# Patient Record
Sex: MALE | Race: BLACK OR AFRICAN AMERICAN | Employment: OTHER | URBAN - METROPOLITAN AREA
[De-identification: names, ages, dates, MRNs, and addresses within clinical notes are randomized per-mention and may not be internally consistent; named-entity substitution may affect disease eponyms.]

---

## 2017-01-03 ENCOUNTER — TELEPHONE (OUTPATIENT)
Dept: PRIMARY CARE CLINIC | Age: 67
End: 2017-01-03

## 2017-01-17 ENCOUNTER — OFFICE VISIT (OUTPATIENT)
Dept: FAMILY MEDICINE CLINIC | Age: 67
End: 2017-01-17

## 2017-01-17 VITALS
WEIGHT: 233 LBS | HEIGHT: 69 IN | BODY MASS INDEX: 34.51 KG/M2 | TEMPERATURE: 97.7 F | HEART RATE: 70 BPM | SYSTOLIC BLOOD PRESSURE: 132 MMHG | RESPIRATION RATE: 16 BRPM | DIASTOLIC BLOOD PRESSURE: 72 MMHG

## 2017-01-17 DIAGNOSIS — E78.2 MIXED HYPERLIPIDEMIA: ICD-10-CM

## 2017-01-17 DIAGNOSIS — I10 ESSENTIAL HYPERTENSION: ICD-10-CM

## 2017-01-17 NOTE — MR AVS SNAPSHOT
Visit Information Date & Time Provider Department Dept. Phone Encounter #  
 1/17/2017 12:30 PM Burt Escalante NP [place] [address] [id number] Follow-up Instructions Return in about 1 week (around 1/24/2017). Your Appointments 12/18/2017 10:00 AM  
Office Visit with MD Bryanna Lema 23 [facility]) Appt Note:   
 [address]  
  
   
 [address] Upcoming Health Maintenance Date Due DTaP/Tdap/Td series (1 - Tdap) 5/16/1971 Pneumococcal 65+ High/Highest Risk (1 of 2 - PCV13) 5/16/2015 EYE EXAM RETINAL OR DILATED Q1 8/24/2016 FOOT EXAM Q1 10/14/2016 HEMOGLOBIN A1C Q6M 6/21/2017 GLAUCOMA SCREENING Q2Y 8/24/2017 Prostate-Specific Antigen 10/3/2017 MEDICARE YEARLY EXAM 12/14/2017 MICROALBUMIN Q1 12/21/2017 LIPID PANEL Q1 12/21/2017 COLONOSCOPY 1/13/2020 Allergies as of 1/17/2017  Review Complete On: 1/17/2017 By: Yevgeniy Bullock LPN Severity Noted Reaction Type Reactions Iodine  07/27/2015    Other (comments) Vasotec [Enalapril Maleate]  12/16/2016    Other (comments) Thought to have possibly caused his pancreatitis Current Immunizations  Reviewed on 1/17/2017 Name Date Influenza High Dose Vaccine PF 10/22/2015 Influenza Vaccine 12/1/2014 Zoster Vaccine, Live 1/1/2015 Reviewed by Yevgeniy Bullock LPN on 9/54/4819 at 75:43 PM  
You Were Diagnosed With   
  
 Codes Comments Insulin dependent type 2 diabetes mellitus, uncontrolled ([facility]    -  Primary ICD-10-CM: E11.65, Z79.4 ICD-9-CM: 250.02, V58.67 Vitals BP Pulse Temp Resp Height(growth percentile) Weight(growth percentile) 132/72 70 97.7 °F (36.5 °C) (Oral) 16 5' 9\" (1.753 m) 233 lb (105.7 kg) BMI Smoking Status 34.41 kg/m2 Former Smoker Vitals History BMI and BSA Data Body Mass Index Body Surface Area 34.41 kg/m 2 2.27 m 2 Preferred Pharmacy Pharmacy Name Phone [facility] [address] [place] [address] Drive [phone] Your Updated Medication List  
  
   
This list is accurate as of: 1/17/17  1:35 PM.  Always use your most recent med list.  
  
  
  
  
 Liang Generic drug:  Blood-Glucose Meter  
by Does Not Apply route. * ACCU-CHEK SMARTVIEW TEST STRIP strip Generic drug:  glucose blood VI test strips  
by Does Not Apply route See Admin Instructions. * glucose blood VI test strips strip Commonly known as:  blood glucose test  
Test strips of patient choice - use to check blood sugar bid or as directed  
  
 amLODIPine 5 mg tablet Commonly known as:  Darvin Alstrom Take 1 Tab by mouth daily. insulin detemir 100 unit/mL (3 mL) Inpn Commonly known as:  LEVEMIR FLEXTOUCH  
20 units nightly. Insulin Needles (Disposable) 32 gauge x 1/4\" Ndle Commonly known as:  NOVOFINE 32 USE AS DIRECTED WITH LEVEMIR DAILY Lancets [facility] Dispense Accu Check Multi-Clix Drum - use to check blood sugar bid or as directed - 90 day supply  
  
 metoprolol tartrate 50 mg tablet Commonly known as:  LOPRESSOR Take 1 Tab by mouth two (2) times a day. THERA tablet Generic drug:  therapeutic multivitamin Take 1 Tab by mouth daily. VITAMIN D3 2,000 unit Cap capsule Generic drug:  Cholecalciferol (Vitamin D3)  
daily. warfarin 3 mg tablet Commonly known as:  COUMADIN Take 1 Tab by mouth daily. * Notice: This list has 2 medication(s) that are the same as other medications prescribed for you. Read the directions carefully, and ask your doctor or other care provider to review them with you. Follow-up Instructions Return in about 1 week (around 1/24/2017). Patient Instructions Learning About Diabetes Food Guidelines Your Care Instructions Meal planning is important to manage diabetes. It helps keep your blood sugar at a target level (which you set with your doctor). You don't have to eat special foods. You can eat what your family eats, including sweets once in a while. But you do have to pay attention to how often you eat and how much you eat of certain foods. You may want to work with a dietitian or a certified diabetes educator (CDE) to help you plan meals and snacks. A dietitian or CDE can also help you lose weight if that is one of your goals. What should you know about eating carbs? Managing the amount of carbohydrate (carbs) you eat is an important part of healthy meals when you have diabetes. Carbohydrate is found in many foods. · Learn which foods have carbs. And learn the amounts of carbs in different foods. ¨ Bread, cereal, pasta, and rice have about 15 grams of carbs in a serving. A serving is 1 slice of bread (1 ounce), ½ cup of cooked cereal, or 1/3 cup of cooked pasta or rice. ¨ Fruits have 15 grams of carbs in a serving. A serving is 1 small fresh fruit, such as an apple or orange; ½ of a banana; ½ cup of cooked or canned fruit; ½ cup of fruit juice; 1 cup of melon or raspberries; or 2 tablespoons of dried fruit. ¨ Milk and no-sugar-added yogurt have 15 grams of carbs in a serving. A serving is 1 cup of milk or 2/3 cup of no-sugar-added yogurt. ¨ Starchy vegetables have 15 grams of carbs in a serving. A serving is ½ cup of mashed potatoes or sweet potato; 1 cup winter squash; ½ of a small baked potato; ½ cup of cooked beans; or ½ cup cooked corn or green peas. · Learn how much carbs to eat each day and at each meal. A dietitian or CDE can teach you how to keep track of the amount of carbs you eat. This is called carbohydrate counting.  
· If you are not sure how to count carbohydrate grams, use the Plate Method to plan meals. It is a good, quick way to make sure that you have a balanced meal. It also helps you spread carbs throughout the day. ¨ Divide your plate by types of foods. Put non-starchy vegetables on half the plate, meat or other protein food on one-quarter of the plate, and a grain or starchy vegetable in the final quarter of the plate. To this you can add a small piece of fruit and 1 cup of milk or yogurt, depending on how many carbs you are supposed to eat at a meal. 
· Try to eat about the same amount of carbs at each meal. Do not \"save up\" your daily allowance of carbs to eat at one meal. 
· Proteins have very little or no carbs per serving. Examples of proteins are beef, chicken, turkey, fish, eggs, tofu, cheese, cottage cheese, and peanut butter. A serving size of meat is 3 ounces, which is about the size of a deck of cards. Examples of meat substitute serving sizes (equal to 1 ounce of meat) are 1/4 cup of cottage cheese, 1 egg, 1 tablespoon of peanut butter, and ½ cup of tofu. How can you eat out and still eat healthy? · Learn to estimate the serving sizes of foods that have carbohydrate. If you measure food at home, it will be easier to estimate the amount in a serving of restaurant food. · If the meal you order has too much carbohydrate (such as potatoes, corn, or baked beans), ask to have a low-carbohydrate food instead. Ask for a salad or green vegetables. · If you use insulin, check your blood sugar before and after eating out to help you plan how much to eat in the future. · If you eat more carbohydrate at a meal than you had planned, take a walk or do other exercise. This will help lower your blood sugar. What else should you know? · Limit saturated fat, such as the fat from meat and dairy products. This is a healthy choice because people who have diabetes are at higher risk of heart disease.  So choose lean cuts of meat and nonfat or low-fat dairy products. Use olive or canola oil instead of butter or shortening when cooking. · Don't skip meals. Your blood sugar may drop too low if you skip meals and take insulin or certain medicines for diabetes. · Check with your doctor before you drink alcohol. Alcohol can cause your blood sugar to drop too low. Alcohol can also cause a bad reaction if you take certain diabetes medicines. Follow-up care is a key part of your treatment and safety. Be sure to make and go to all appointments, and call your doctor if you are having problems. It's also a good idea to know your test results and keep a list of the medicines you take. Where can you learn more? Go to http://tayler-joan.info/. Enter J729 in the search box to learn more about \"Learning About Diabetes Food Guidelines. \" Current as of: May 23, 2016 Content Version: 11.1 © 6535-7653 [employer]. Care instructions adapted under license by [employer] (which disclaims liability or warranty for this information). If you have questions about a medical condition or this instruction, always ask your healthcare professional. [address] any warranty or liability for your use of this information. Introducing [facility]! Michael Garcia introduces [employer] patient portal. Now you can access parts of your medical record, email your doctor's office, and request medication refills online. 1. In your internet browser, go to [email or web address] 2. Click on the First Time User? Click Here link in the Sign In box. You will see the New Member Sign Up page. 3. Enter your [employer] Access Code exactly as it appears below. You will not need to use this code after youve completed the sign-up process. If you do not sign up before the expiration date, you must request a new code. · [employer] Access Code: [email or web address] Expires: 4/4/2017  9:59 AM 
 
 4. Enter the last four digits of your Social Security Number (xxxx) and Date of Birth (mm/dd/yyyy) as indicated and click Submit. You will be taken to the next sign-up page. 5. Create a [employer] ID. This will be your [employer] login ID and cannot be changed, so think of one that is secure and easy to remember. 6. Create a [employer] password. You can change your password at any time. 7. Enter your Password Reset Question and Answer. This can be used at a later time if you forget your password. 8. Enter your e-mail address. You will receive e-mail notification when new information is available in [address] 9. Click Sign Up. You can now view and download portions of your medical record. 10. Click the Download Summary menu link to download a portable copy of your medical information. If you have questions, please visit the Frequently Asked Questions section of the [employer] website. Remember, [employer] is NOT to be used for urgent needs. For medical emergencies, dial 911. Now available from your iPhone and Android! Please provide this summary of care documentation to your next provider. Your primary care clinician is listed as Kadeem Aquino. If you have any questions after today's visit, please call [phone].

## 2017-01-17 NOTE — PROGRESS NOTES
1. Have you been to the ER, urgent care clinic since your last visit? Hospitalized since your last visit? No    2. Have you seen or consulted any other health care providers outside of the [address] [place] [address] since your last visit? Include any pap smears or colon screening.  No

## 2017-01-17 NOTE — PROGRESS NOTES
Chief Complaint   Patient presents with    Blood sugar problem     *Blood sugar this a.m. was 300     HPI:    This is a 76 y/o male patient who presents for the above reason. Concerned about recent blood sugar elevations which have jorge alberto in the 300s. BS this am per pt was -300, yesterday 302  Last A1c- 7.1    Patient states compliance taking insulin has directed. He confirms he was previously taking Levemir 20 units but was recently increased to 30 by PCP. PCP note reviewed indicates a decrease from 30 to 25 units - will keep patient on 30 units due to recent BS elevation and re-evaluate in 1 week      Recently had a bone marrow biopsy and is followed by hematology for auto immune hemolytic anemia. ROS: pertinent positive as noted in HPI.  All others were negative        Past Medical History   Diagnosis Date    Colon polyps      Dr. Umm Vaz DDD (degenerative disc disease), lumbar 9/15    DM type 2 (diabetes mellitus, type 2) ([facility] [address] [facility]     Hyperlipidemia     Hypertension     Internal hemorrhoid     Microalbuminuria     Nocturia     Scre[facility] (abdominal aortic aneurysm) 6/15     [facility]    Sleep apnea      has CPAP - Dr. Radha Danielle Stasis dermatitis     Testicular pain, left     Tobacco abuse     Urinary frequency     Vitamin D deficiency        Social History     Social History    Marital status: SINGLE     Spouse name: N/A    Number of children: N/A    Years of education: N/A     Occupational History    city [facility]     Social History Main Topics    Smoking status: Former Smoker     Packs/day: 1.00     Years: 42.00     Types: Cigarettes     Quit date: 9/22/2016    Smokeless tobacco: Never Used    Alcohol use Yes      Comment: occasional    Drug use: No    Sexual activity: No      Comment: single     Other Topics Concern    Not on file     Social History Narrative       Current Outpatient Prescriptions   Medication Sig Dispense Refill    amLODIPine (NORVASC) 5 mg tablet Take 1 Tab by mouth daily. 30 Tab 5    warfarin (COUMADIN) 3 mg tablet Take 1 Tab by mouth daily. (Patient taking differently: Take 4.5 mg by mouth daily. Patient taking 1.5 tabs po daily) 10 Tab 0    metoprolol tartrate (LOPRESSOR) 50 mg tablet Take 1 Tab by mouth two (2) times a day. 60 Tab 2    therapeutic multivitamin (THERA) tablet Take 1 Tab by mouth daily.  insulin detemir (LEVEMIR FLEXTOUCH) 100 unit/mL (3 mL) inpn 20 units nightly. (Patient taking differently: 25 Units. in the evening) 15 mL 5    Insulin Needles, Disposable, (NOVOFINE 32) 32 gauge x 1/4\" ndle USE AS DIRECTED WITH LEVEMIR DAILY 100 Pen Needle 11    Lancets misc Dispense Accu Check Multi-Clix Drum - use to check blood sugar bid or as directed - 90 day supply 100 Each 3    glucose blood VI test strips (BLOOD GLUCOSE TEST) strip Test strips of patient choice - use to check blood sugar bid or as directed 200 Strip 3    Blood-Glucose Meter (ACCU-CHEK BRY) misc by Does Not Apply route.  glucose blood VI test strips (ACCU-CHEK SMARTVIEW) strip by Does Not Apply route See Admin Instructions.  Cholecalciferol, Vitamin D3, (VITAMIN D3) 2,000 unit cap daily.        Allergies   Allergen Reactions    Iodine Other (comments)    Vasotec [Enalapril Maleate] Other (comments)     Thought to have possibly caused his pancreatitis       Physical Exam:    Vital Signs:   Visit Vitals    /72    Pulse 70    Temp 97.7 °F (36.5 °C) (Oral)    Resp 16    Ht 5' 9\" (1.753 m)    Wt 233 lb (105.7 kg)    BMI 34.41 kg/m2         General: a, a & o x 3, afebrile,  interacting appropriately, in no acute distress  HEENT: head normocephalic and atraumatic, conjuctiva clear  Skin: warm and dry, no rashes , no bruises, no skin lesions  Neck: supple, symmetrical, no palpable mass, no thyromegaly, no carotid bruits, no JVD  Respiratory: lung sounds clear bilaterally,  good respiratory effort, no wheezes or crackles  Cardiovascular: normal S1S2, regular rate and rhythm, no murmurs    Assessment/Plan:      ICD-10-CM ICD-9-CM    1. Insulin dependent type 2 diabetes mellitus, uncontrolled (HCC) E11.65 250.02 Continue with Levemir 30 units QHS    Monitor BS TID and call in blood sugar reading to office tomorrow. May consider adding short acting insulin if BS are not controlled with Levemir. Patient advised to go to the ER if BS >350        Z79.4 V58.67       Z79.4 V58.67    2. Essential hypertension I10 401.9 Continue with current medication   3. Mixed hyperlipidemia E78.2 272.2 Controlled with diet           Additional Notes: Discussed today's diagnosis, treatment plans. Discussed medication indications and side effects. After Visit Summary: Provided and discussed printed patient instructions. Answered questions in relation to today's diagnosis.   Follow-up Disposition: 1 week follow up          TERRIE Jacobs  [address] [facility],[address]

## 2017-01-17 NOTE — PATIENT INSTRUCTIONS

## 2017-01-18 ENCOUNTER — TELEPHONE (OUTPATIENT)
Dept: FAMILY MEDICINE CLINIC | Age: 67
End: 2017-01-18

## 2017-01-18 NOTE — TELEPHONE ENCOUNTER
Patient called office stating that Toney Fuentes NP instructed him to call with his FBS this morning. Patient reports his blood sugar was 130.

## 2017-01-24 ENCOUNTER — OFFICE VISIT (OUTPATIENT)
Dept: FAMILY MEDICINE CLINIC | Age: 67
End: 2017-01-24

## 2017-01-24 VITALS
HEIGHT: 69 IN | BODY MASS INDEX: 34.51 KG/M2 | HEART RATE: 57 BPM | SYSTOLIC BLOOD PRESSURE: 124 MMHG | RESPIRATION RATE: 18 BRPM | TEMPERATURE: 96.3 F | WEIGHT: 233 LBS | DIASTOLIC BLOOD PRESSURE: 62 MMHG

## 2017-01-24 DIAGNOSIS — I10 ESSENTIAL HYPERTENSION: ICD-10-CM

## 2017-01-24 LAB — GLUCOSE POC: 263 MG/DL

## 2017-01-24 NOTE — MR AVS SNAPSHOT
Visit Information Date & Time Provider Department Dept. Phone Encounter #  
 1/24/2017  9:30 AM Ari Perez NP Hussain Segundo [id number] Follow-up Instructions Return in about 1 month (around 2/24/2017) for follow up chronic condition. Your Appointments 12/18/2017 10:00 AM  
Office Visit with MD Bryanna Eduardo [facility]) Appt Note:   
 [address]  
  
   
 [address] [facility] Upcoming Health Maintenance Date Due DTaP/Tdap/Td series (1 - Tdap) 5/16/1971 Pneumococcal 65+ High/Highest Risk (1 of 2 - PCV13) 5/16/2015 FOOT EXAM Q1 10/14/2016 HEMOGLOBIN A1C Q6M 6/21/2017 Prostate-Specific Antigen 10/3/2017 MEDICARE YEARLY EXAM 12/14/2017 EYE EXAM RETINAL OR DILATED Q1 12/15/2017 MICROALBUMIN Q1 12/21/2017 LIPID PANEL Q1 12/21/2017 GLAUCOMA SCREENING Q2Y 12/15/2018 COLONOSCOPY 1/13/2020 Allergies as of 1/24/2017  Review Complete On: 1/24/2017 By: Nancy Echavarria LPN Severity Noted Reaction Type Reactions Iodine  07/27/2015    Other (comments) Vasotec [Enalapril Maleate]  12/16/2016    Other (comments) Thought to have possibly caused his pancreatitis Current Immunizations  Reviewed on 1/17/2017 Name Date Influenza High Dose Vaccine PF 10/22/2015 Influenza Vaccine 12/1/2014 Zoster Vaccine, Live 1/1/2015 Not reviewed this visit You Were Diagnosed With   
  
 Codes Comments Insulin dependent type 2 diabetes mellitus, uncontrolled [facility]    -  Primary ICD-10-CM: E11.65, Z79.4 ICD-9-CM: 250.02, V58.67 Essential hypertension     ICD-10-CM: I10 
ICD-9-CM: 401.9 Vitals BP Pulse Temp Resp Height(growth percentile) Weight(growth percentile) 124/62 (!) 57 96.3 °F (35.7 °C) (Oral) 18 5' 9\" (1.753 m) 233 lb (105.7 kg) BMI Smoking Status 34.41 kg/m2 Former Smoker BMI and BSA Data Body Mass Index Body Surface Area 34.41 kg/m 2 2.27 m 2 Preferred Pharmacy Pharmacy Name Phone [facility] [address] [phone] Your Updated Medication List  
  
   
This list is accurate as of: 1/24/17 10:12 AM.  Always use your most recent med list.  
  
  
  
  
 Castillo Chavez Generic drug:  Blood-Glucose Meter  
by Does Not Apply route. * ACCU-CHEK SMARTVIEW TEST STRIP strip Generic drug:  glucose blood VI test strips  
by Does Not Apply route See Admin Instructions. * glucose blood VI test strips strip Commonly known as:  blood glucose test  
Test strips of patient choice - use to check blood sugar bid or as directed  
  
 amLODIPine 5 mg tablet Commonly known as:  Champ Fraireer Take 1 Tab by mouth daily. insulin detemir 100 unit/mL (3 mL) Inpn Commonly known as:  Stephanie Salt Inject 30 units subcutaneously in the evening Insulin Needles (Disposable) 32 gauge x 1/4\" Ndle Commonly known as:  NOVOFINE 32 USE AS DIRECTED WITH LEVEMIR DAILY Lancets [facility] Dispense Accu Check Multi-Clix Drum - use to check blood sugar bid or as directed - 90 day supply  
  
 metoprolol tartrate 50 mg tablet Commonly known as:  LOPRESSOR Take 1 Tab by mouth two (2) times a day. THERA tablet Generic drug:  therapeutic multivitamin Take 1 Tab by mouth daily. VITAMIN D3 2,000 unit Cap capsule Generic drug:  Cholecalciferol (Vitamin D3)  
daily. warfarin 3 mg tablet Commonly known as:  COUMADIN Take 1 Tab by mouth daily. ZINC PO Take  by mouth. Take 1 tablet by mouth daily. * Notice: This list has 2 medication(s) that are the same as other medications prescribed for you. Read the directions carefully, and ask your doctor or other care provider to review them with you. Prescriptions Sent to Pharmacy Refills  
 insulin detemir (LEVEMIR FLEXTOUCH) 100 unit/mL (3 mL) inpn 5 Sig: Inject 30 units subcutaneously in the evening Class: Normal  
 Pharmacy: [address] [place] [address], [address] [place] [address] #: [phone] We Performed the Following AMB POC GLUCOSE BLOOD, BY GLUCOSE MONITORING DEVICE [80225 CPT(R)] Follow-up Instructions Return in about 1 month (around 2/24/2017) for follow up chronic condition. Patient Instructions Learning About Diabetes Food Guidelines Your Care Instructions Meal planning is important to manage diabetes. It helps keep your blood sugar at a target level (which you set with your doctor). You don't have to eat special foods. You can eat what your family eats, including sweets once in a while. But you do have to pay attention to how often you eat and how much you eat of certain foods. You may want to work with a dietitian or a certified diabetes educator (CDE) to help you plan meals and snacks. A dietitian or CDE can also help you lose weight if that is one of your goals. What should you know about eating carbs? Managing the amount of carbohydrate (carbs) you eat is an important part of healthy meals when you have diabetes. Carbohydrate is found in many foods. · Learn which foods have carbs. And learn the amounts of carbs in different foods. ¨ Bread, cereal, pasta, and rice have about 15 grams of carbs in a serving. A serving is 1 slice of bread (1 ounce), ½ cup of cooked cereal, or 1/3 cup of cooked pasta or rice. ¨ Fruits have 15 grams of carbs in a serving. A serving is 1 small fresh fruit, such as an apple or orange; ½ of a banana; ½ cup of cooked or canned fruit; ½ cup of fruit juice; 1 cup of melon or raspberries; or 2 tablespoons of dried fruit. ¨ Milk and no-sugar-added yogurt have 15 grams of carbs in a serving.  A serving is 1 cup of milk or 2/3 cup of no-sugar-added yogurt. ¨ Starchy vegetables have 15 grams of carbs in a serving. A serving is ½ cup of mashed potatoes or sweet potato; 1 cup winter squash; ½ of a small baked potato; ½ cup of cooked beans; or ½ cup cooked corn or green peas. · Learn how much carbs to eat each day and at each meal. A dietitian or CDE can teach you how to keep track of the amount of carbs you eat. This is called carbohydrate counting. · If you are not sure how to count carbohydrate grams, use the Plate Method to plan meals. It is a good, quick way to make sure that you have a balanced meal. It also helps you spread carbs throughout the day. ¨ Divide your plate by types of foods. Put non-starchy vegetables on half the plate, meat or other protein food on one-quarter of the plate, and a grain or starchy vegetable in the final quarter of the plate. To this you can add a small piece of fruit and 1 cup of milk or yogurt, depending on how many carbs you are supposed to eat at a meal. 
· Try to eat about the same amount of carbs at each meal. Do not \"save up\" your daily allowance of carbs to eat at one meal. 
· Proteins have very little or no carbs per serving. Examples of proteins are beef, chicken, turkey, fish, eggs, tofu, cheese, cottage cheese, and peanut butter. A serving size of meat is 3 ounces, which is about the size of a deck of cards. Examples of meat substitute serving sizes (equal to 1 ounce of meat) are 1/4 cup of cottage cheese, 1 egg, 1 tablespoon of peanut butter, and ½ cup of tofu. How can you eat out and still eat healthy? · Learn to estimate the serving sizes of foods that have carbohydrate. If you measure food at home, it will be easier to estimate the amount in a serving of restaurant food. · If the meal you order has too much carbohydrate (such as potatoes, corn, or baked beans), ask to have a low-carbohydrate food instead. Ask for a salad or green vegetables. · If you use insulin, check your blood sugar before and after eating out to help you plan how much to eat in the future. · If you eat more carbohydrate at a meal than you had planned, take a walk or do other exercise. This will help lower your blood sugar. What else should you know? · Limit saturated fat, such as the fat from meat and dairy products. This is a healthy choice because people who have diabetes are at higher risk of heart disease. So choose lean cuts of meat and nonfat or low-fat dairy products. Use olive or canola oil instead of butter or shortening when cooking. · Don't skip meals. Your blood sugar may drop too low if you skip meals and take insulin or certain medicines for diabetes. · Check with your doctor before you drink alcohol. Alcohol can cause your blood sugar to drop too low. Alcohol can also cause a bad reaction if you take certain diabetes medicines. Follow-up care is a key part of your treatment and safety. Be sure to make and go to all appointments, and call your doctor if you are having problems. It's also a good idea to know your test results and keep a list of the medicines you take. Where can you learn more? Go to http://tayler-joan.info/. Enter K331 in the search box to learn more about \"Learning About Diabetes Food Guidelines. \" Current as of: May 23, 2016 Content Version: 11.1 © 6524-1234 [employer]. Care instructions adapted under license by [employer] (which disclaims liability or warranty for this information). If you have questions about a medical condition or this instruction, always ask your healthcare professional. [place] [address] any warranty or liability for your use of this information. Diabetes Blood Sugar Emergencies: Your Action Plan How can you prevent a blood sugar emergency? An important part of living with diabetes is keeping your blood sugar in your target range. You'll need to know what to do if it's too high or too low. Managing your blood sugar levels helps you avoid emergencies. This care sheet will teach you about the signs of high and low blood sugar. It will help you make an action plan with your doctor for when these signs occur. Low blood sugar is more likely to happen if you take certain medicines for diabetes. It can also happen if you skip a meal, drink alcohol, or exercise more than usual. 
You may get high blood sugar if you eat differently than you normally do. One example is eating more carbohydrate than usual. Having a cold, the flu, or other sudden illness can also cause high blood sugar levels. Levels can also rise if you miss a dose of medicine. Any change in how you take your medicine may affect your blood sugar level. So it's important to work with your doctor before you make any changes. Check your blood sugar Work with your doctor to fill in the blank spaces below that apply to you. Track your levels, know your target range, and write down ways you can get your blood sugar back in your target range. A log book can help you track your levels. Take the book to all of your medical appointments. · Check your blood sugar _____ times a day, at these times:________________________________________________. (For example: Before meals, at bedtime, before exercise, during exercise, other.) · Your blood sugar target range before a meal is ___________________. Your blood sugar target range after a meal is _______________________. · Do this___________________________________________________to get your blood sugar back within your safe range if your blood sugar results are _________________________________________. (For example: Less than 70 or above 250 mg/dL.) Call your doctor when your blood sugar results are ___________________________________. (For example: Less than 70 or above 250 mg/dL. ) What are the symptoms of low and high blood sugar? Common symptoms of low blood sugar are sweating and feeling shaky, weak, hungry, or confused. Symptoms can start quickly. Common symptoms of high blood sugar are feeling very thirsty or very hungry. You may also pass urine more often than usual. You may have blurry vision and may lose weight without trying. But some people may have high or low blood sugar without having any symptoms. That's a good reason to check your blood sugar on a regular schedule. What should you do if you have symptoms? Work with your doctor to fill in the blank spaces below that apply to you. Low blood sugar If you have symptoms of low blood sugar, check your blood sugar. If it's below _____ (for example, below 70), eat or drink a quick-sugar food that has about 15 grams of carbohydrate. Your goal is to get your level back to your safe range. Check your blood sugar again 15 minutes later. If it's still not in your target range, take another 15 grams of carbohydrate and check your blood sugar again in 15 minutes. Repeat this until you reach your target. Then go back to your regular testing schedule. When you have low blood sugar, it's best to stop or reduce any physical activity until your blood sugar is back in your target range and is stable. If you must stay active, eat or drink 30 grams of carbohydrate. Then check your blood sugar again in 15 minutes. If it's not in your target range, take another 30 grams of carbohydrates. Check your blood sugar again in 15 minutes. Keep doing this until you reach your target. You can then go back to your regular testing schedule. If your symptoms or blood sugar levels are getting worse or have not improved after 15 minutes, seek medical care right away. Here are some examples of quick-sugar foods with 15 grams of carbohydrate: · 3 or 4 glucose tablets · 1 tube of glucose gel · Hard candy (such as 3 Jolly Ranchers or 5 to [employer]) · ½ cup to ¾ cup (4 to 6 ounces) of fruit juice or regular (not diet) soda High blood sugar If you have symptoms of high blood sugar, check your blood sugar. Your goal is to get your level back to your target range. If it's above ______ (for example, above 250), follow these steps: · If you missed a dose of your diabetes medicine, take it now. Take only the amount of medicine that you have been prescribed. Do not take more or less medicine. · Give yourself insulin if your doctor has prescribed it for high blood sugar. · Test for ketones, if the doctor told you to do so. If the results of the ketone test show a moderate-to-large amount of ketones, call the doctor for advice. · Wait 30 minutes after you take the extra insulin or the missed medicine. Check your blood sugar again. If your symptoms or blood sugar levels are getting worse or have not improved after taking these steps, seek medical care right away. Follow-up care is a key part of your treatment and safety. Be sure to make and go to all appointments, and call your doctor if you are having problems. It's also a good idea to know your test results and keep a list of the medicines you take. Where can you learn more? Go to http://tayler-joan.info/. Enter U877 in the search box to learn more about \"Diabetes Blood Sugar Emergencies: Your Action Plan. \" Current as of: May 23, 2016 Content Version: 11.1 © 2080-5046 [employer]. Care instructions adapted under license by [employer] (which disclaims liability or warranty for this information). If you have questions about a medical condition or this instruction, always ask your healthcare professional. [address] any warranty or liability for your use of this information. Introducing [facility]!    
 Viviane Parsons introduces [employer] patient portal. Now you can access parts of your medical record, email your doctor's office, and request medication refills online. 1. In your internet browser, go to [email or web address] 2. Click on the First Time User? Click Here link in the Sign In box. You will see the New Member Sign Up page. 3. Enter your [employer] Access Code exactly as it appears below. You will not need to use this code after youve completed the sign-up process. If you do not sign up before the expiration date, you must request a new code. · [employer] Access Code: [email or web address] Expires: 4/4/2017  9:59 AM 
 
4. Enter the last four digits of your Social Security Number (xxxx) and Date of Birth (mm/dd/yyyy) as indicated and click Submit. You will be taken to the next sign-up page. 5. Create a [employer] ID. This will be your [employer] login ID and cannot be changed, so think of one that is secure and easy to remember. 6. Create a [employer] password. You can change your password at any time. 7. Enter your Password Reset Question and Answer. This can be used at a later time if you forget your password. 8. Enter your e-mail address. You will receive e-mail notification when new information is available in [address] 9. Click Sign Up. You can now view and download portions of your medical record. 10. Click the Download Summary menu link to download a portable copy of your medical information. If you have questions, please visit the Frequently Asked Questions section of the [employer] website. Remember, [employer] is NOT to be used for urgent needs. For medical emergencies, dial 911. Now available from your iPhone and Android! Please provide this summary of care documentation to your next provider. Your primary care clinician is listed as Adele Garcia. If you have any questions after today's visit, please call [phone].

## 2017-01-24 NOTE — PROGRESS NOTES
Chief Complaint   Patient presents with    Diabetes     1 week follow up       HPI:    This is a is 78 y/o male patient who presents for follow up diabetes. He denies SOB, CP, dizziness, headache but concerned he is loosing weight. Diabetes: patient states he is compliant checking BS sugars daily but forgot to take his insulin last night. Pt reported blood sugar this morning - 261  Last A1c- 7.1    Pt has appointment with endocrine Dr Denise Beasley- 2-15-17    He sees hematologist Dr Jose Newell Monday for anemia      Results for orders placed or performed in visit on 01/24/17   AMB POC GLUCOSE BLOOD, BY GLUCOSE MONITORING DEVICE   Result Value Ref Range    Glucose  mg/dL             ROS: pertinent positive as noted in HPI. All others were negative        Past Medical History   Diagnosis Date    Colon polyps      Dr. Denia Gudino DDD (degenerative disc disease), lumbar 9/15    DM type 2 (diabetes mellitus, type 2) ([facility] [address]     Hyperlipidemia     Hypertension     Internal hemorrhoid     Microalbuminuria     Nocturia     Screening for AAA (abdominal aortic aneurysm) 6/15     [facility]    Sleep apnea      has CPAP - Dr. Castillo Angelo Stasis dermatitis     Testicular pain, left     Tobacco abuse     Urinary frequency     Vitamin D deficiency        Social History     Social History    Marital status: SINGLE     Spouse name: N/A    Number of children: N/A    Years of education: N/A     Occupational History    city of [facility]     Social History Main Topics    Smoking status: Former Smoker     Packs/day: 1.00     Years: 42.00     Types: Cigarettes     Quit date: 9/22/2016    Smokeless tobacco: Never Used    Alcohol use Yes      Comment: occasional    Drug use: No    Sexual activity: No      Comment: single     Other Topics Concern    Not on file     Social History Narrative     Current Outpatient Prescriptions   Medication Sig Dispense Refill    ZINC PO Take  by mouth.  Take 1 tablet by mouth daily.  insulin detemir (LEVEMIR FLEXTOUCH) 100 unit/mL (3 mL) inpn Inject 30 units subcutaneously in the evening 15 mL 5    amLODIPine (NORVASC) 5 mg tablet Take 1 Tab by mouth daily. 30 Tab 5    warfarin (COUMADIN) 3 mg tablet Take 1 Tab by mouth daily. (Patient taking differently: Take 6 mg by mouth daily. Patient taking 1.5 tabs po daily) 10 Tab 0    metoprolol tartrate (LOPRESSOR) 50 mg tablet Take 1 Tab by mouth two (2) times a day. 60 Tab 2    therapeutic multivitamin (THERA) tablet Take 1 Tab by mouth daily.  Insulin Needles, Disposable, (NOVOFINE 32) 32 gauge x 1/4\" ndle USE AS DIRECTED WITH LEVEMIR DAILY 100 Pen Needle 11    Lancets misc Dispense Accu Check Multi-Clix Drum - use to check blood sugar bid or as directed - 90 day supply 100 Each 3    glucose blood VI test strips (BLOOD GLUCOSE TEST) strip Test strips of patient choice - use to check blood sugar bid or as directed 200 Strip 3    Blood-Glucose Meter (ACCU-CHEK BRY) misc by Does Not Apply route.  glucose blood VI test strips (ACCU-CHEK SMARTVIEW) strip by Does Not Apply route See Admin Instructions.  Cholecalciferol, Vitamin D3, (VITAMIN D3) 2,000 unit cap daily.            Allergies   Allergen Reactions    Iodine Other (comments)    Vasotec [Enalapril Maleate] Other (comments)     Thought to have possibly caused his pancreatitis       Physical Exam:    Vital Signs:     Visit Vitals    /62    Pulse (!) 57    Temp 96.3 °F (35.7 °C) (Oral)    Resp 18    Ht 5' 9\" (1.753 m)    Wt 233 lb (105.7 kg)    BMI 34.41 kg/m2         General: a, a & o x 3, afebrile,  interacting appropriately, in no acute distress  HEENT: head normocephalic and atraumatic, conjuctiva clear  Respiratory: lung sounds clear bilaterally, good respiratory effort, no wheezes or crackles  Cardiovascular: normal S1S2, regular rate and rhythm, no murmurs  Abdomen: non-distended, normal bowel sounds x 4 quadrants, soft, non-tender to palpation,      Assessment/Plan:      ICD-10-CM ICD-9-CM    1. Insulin dependent type 2 diabetes mellitus, uncontrolled (HCC) E11.65 250.02 AMB POC GLUCOSE BLOOD, BY GLUCOSE MONITORING DEVICE    Z79.4 V58.67 insulin detemir (LEVEMIR FLEXTOUCH) 100 unit/mL (3 mL) inpn    Patient advised to take BS TID and call in blood sugars on Friday. Advised to go the ER if BS greater than 300    May consider adding a short acting insulin if BS remain elevated. 2. Essential hypertension ( controlled) I10 401.9 Continue with current medication         Additional Notes: Discussed today's diagnosis, treatment plans. Discussed medication indications and side effects. After Visit Summary: Provided and discussed printed patient instructions. Answered questions in relation to today's diagnosis.   Follow-up Disposition: 1 month follow up        Melissa Live NP-BC  Family Medicine  [facility]        Orders Placed This Encounter    AMB POC GLUCOSE BLOOD, BY GLUCOSE MONITORING DEVICE    ZINC PO    insulin detemir (LEVEMIR FLEXTOUCH) 100 unit/mL (3 mL) inpn

## 2017-01-24 NOTE — PROGRESS NOTES
1. Have you been to the ER, urgent care clinic since your last visit? Hospitalized since your last visit? No    2. Have you seen or consulted any other health care providers outside of the [address] [place] [address] since your last visit? Include any pap smears or colon screening.  No

## 2017-01-24 NOTE — PATIENT INSTRUCTIONS
Learning About Diabetes Food Guidelines  Your Care Instructions  Meal planning is important to manage diabetes. It helps keep your blood sugar at a target level (which you set with your doctor). You don't have to eat special foods. You can eat what your family eats, including sweets once in a while. But you do have to pay attention to how often you eat and how much you eat of certain foods. You may want to work with a dietitian or a certified diabetes educator (CDE) to help you plan meals and snacks. A dietitian or CDE can also help you lose weight if that is one of your goals. What should you know about eating carbs? Managing the amount of carbohydrate (carbs) you eat is an important part of healthy meals when you have diabetes. Carbohydrate is found in many foods. · Learn which foods have carbs. And learn the amounts of carbs in different foods. ¨ Bread, cereal, pasta, and rice have about 15 grams of carbs in a serving. A serving is 1 slice of bread (1 ounce), ½ cup of cooked cereal, or 1/3 cup of cooked pasta or rice. ¨ Fruits have 15 grams of carbs in a serving. A serving is 1 small fresh fruit, such as an apple or orange; ½ of a banana; ½ cup of cooked or canned fruit; ½ cup of fruit juice; 1 cup of melon or raspberries; or 2 tablespoons of dried fruit. ¨ Milk and no-sugar-added yogurt have 15 grams of carbs in a serving. A serving is 1 cup of milk or 2/3 cup of no-sugar-added yogurt. ¨ Starchy vegetables have 15 grams of carbs in a serving. A serving is ½ cup of mashed potatoes or sweet potato; 1 cup winter squash; ½ of a small baked potato; ½ cup of cooked beans; or ½ cup cooked corn or green peas. · Learn how much carbs to eat each day and at each meal. A dietitian or CDE can teach you how to keep track of the amount of carbs you eat. This is called carbohydrate counting. · If you are not sure how to count carbohydrate grams, use the Plate Method to plan meals.  It is a good, quick way to make sure that you have a balanced meal. It also helps you spread carbs throughout the day. ¨ Divide your plate by types of foods. Put non-starchy vegetables on half the plate, meat or other protein food on one-quarter of the plate, and a grain or starchy vegetable in the final quarter of the plate. To this you can add a small piece of fruit and 1 cup of milk or yogurt, depending on how many carbs you are supposed to eat at a meal.  · Try to eat about the same amount of carbs at each meal. Do not \"save up\" your daily allowance of carbs to eat at one meal.  · Proteins have very little or no carbs per serving. Examples of proteins are beef, chicken, turkey, fish, eggs, tofu, cheese, cottage cheese, and peanut butter. A serving size of meat is 3 ounces, which is about the size of a deck of cards. Examples of meat substitute serving sizes (equal to 1 ounce of meat) are 1/4 cup of cottage cheese, 1 egg, 1 tablespoon of peanut butter, and ½ cup of tofu. How can you eat out and still eat healthy? · Learn to estimate the serving sizes of foods that have carbohydrate. If you measure food at home, it will be easier to estimate the amount in a serving of restaurant food. · If the meal you order has too much carbohydrate (such as potatoes, corn, or baked beans), ask to have a low-carbohydrate food instead. Ask for a salad or green vegetables. · If you use insulin, check your blood sugar before and after eating out to help you plan how much to eat in the future. · If you eat more carbohydrate at a meal than you had planned, take a walk or do other exercise. This will help lower your blood sugar. What else should you know? · Limit saturated fat, such as the fat from meat and dairy products. This is a healthy choice because people who have diabetes are at higher risk of heart disease. So choose lean cuts of meat and nonfat or low-fat dairy products. Use olive or canola oil instead of butter or shortening when cooking.   · Don't skip meals. Your blood sugar may drop too low if you skip meals and take insulin or certain medicines for diabetes. · Check with your doctor before you drink alcohol. Alcohol can cause your blood sugar to drop too low. Alcohol can also cause a bad reaction if you take certain diabetes medicines. Follow-up care is a key part of your treatment and safety. Be sure to make and go to all appointments, and call your doctor if you are having problems. It's also a good idea to know your test results and keep a list of the medicines you take. Where can you learn more? Go to http://[employer].info/. Enter L991 in the search box to learn more about \"Learning About Diabetes Food Guidelines. \"  Current as of: May 23, 2016  Content Version: 11.1  © 5701-2238 [employer]. Care instructions adapted under license by [employer] (which disclaims liability or warranty for this information). If you have questions about a medical condition or this instruction, always ask your healthcare professional. [address] any warranty or liability for your use of this information. Diabetes Blood Sugar Emergencies: Your Action Plan  How can you prevent a blood sugar emergency? An important part of living with diabetes is keeping your blood sugar in your target range. You'll need to know what to do if it's too high or too low. Managing your blood sugar levels helps you avoid emergencies. This care sheet will teach you about the signs of high and low blood sugar. It will help you make an action plan with your doctor for when these signs occur. Low blood sugar is more likely to happen if you take certain medicines for diabetes. It can also happen if you skip a meal, drink alcohol, or exercise more than usual.  You may get high blood sugar if you eat differently than you normally do.  One example is eating more carbohydrate than usual. Having a cold, the flu, or other sudden illness can also cause high blood sugar levels. Levels can also rise if you miss a dose of medicine. Any change in how you take your medicine may affect your blood sugar level. So it's important to work with your doctor before you make any changes. Check your blood sugar  Work with your doctor to fill in the blank spaces below that apply to you. Track your levels, know your target range, and write down ways you can get your blood sugar back in your target range. A log book can help you track your levels. Take the book to all of your medical appointments. · Check your blood sugar _____ times a day, at these times:________________________________________________. (For example: Before meals, at bedtime, before exercise, during exercise, other.)  · Your blood sugar target range before a meal is ___________________. Your blood sugar target range after a meal is _______________________. · Do this--___________________________________________________--to get your blood sugar back within your safe range if your blood sugar results are _________________________________________. (For example: Less than 70 or above 250 mg/dL.)  Call your doctor when your blood sugar results are ___________________________________. (For example: Less than 70 or above 250 mg/dL.)  What are the symptoms of low and high blood sugar? Common symptoms of low blood sugar are sweating and feeling shaky, weak, hungry, or confused. Symptoms can start quickly. Common symptoms of high blood sugar are feeling very thirsty or very hungry. You may also pass urine more often than usual. You may have blurry vision and may lose weight without trying. But some people may have high or low blood sugar without having any symptoms. That's a good reason to check your blood sugar on a regular schedule. What should you do if you have symptoms? Work with your doctor to fill in the blank spaces below that apply to you.   Low blood sugar  If you have symptoms of low blood sugar, check your blood sugar. If it's below _____ (for example, below 70), eat or drink a quick-sugar food that has about 15 grams of carbohydrate. Your goal is to get your level back to your safe range. Check your blood sugar again 15 minutes later. If it's still not in your target range, take another 15 grams of carbohydrate and check your blood sugar again in 15 minutes. Repeat this until you reach your target. Then go back to your regular testing schedule. When you have low blood sugar, it's best to stop or reduce any physical activity until your blood sugar is back in your target range and is stable. If you must stay active, eat or drink 30 grams of carbohydrate. Then check your blood sugar again in 15 minutes. If it's not in your target range, take another 30 grams of carbohydrates. Check your blood sugar again in 15 minutes. Keep doing this until you reach your target. You can then go back to your regular testing schedule. If your symptoms or blood sugar levels are getting worse or have not improved after 15 minutes, seek medical care right away. Here are some examples of quick-sugar foods with 15 grams of carbohydrate:  · 3 or 4 glucose tablets  · 1 tube of glucose gel  · Hard candy (such as 3 Jolly Ranchers or 5 to 7 Life Savers)  · ½ cup to ¾ cup (4 to 6 ounces) of fruit juice or regular (not diet) soda  High blood sugar  If you have symptoms of high blood sugar, check your blood sugar. Your goal is to get your level back to your target range. If it's above ______ (for example, above 250), follow these steps:  · If you missed a dose of your diabetes medicine, take it now. Take only the amount of medicine that you have been prescribed. Do not take more or less medicine. · Give yourself insulin if your doctor has prescribed it for high blood sugar. · Test for ketones, if the doctor told you to do so.  If the results of the ketone test show a moderate-to-large amount of ketones, call the doctor for advice. · Wait 30 minutes after you take the extra insulin or the missed medicine. Check your blood sugar again. If your symptoms or blood sugar levels are getting worse or have not improved after taking these steps, seek medical care right away. Follow-up care is a key part of your treatment and safety. Be sure to make and go to all appointments, and call your doctor if you are having problems. It's also a good idea to know your test results and keep a list of the medicines you take. Where can you learn more? Go to http://tayler-joan.info/. Enter L118 in the search box to learn more about \"Diabetes Blood Sugar Emergencies: Your Action Plan. \"  Current as of: May 23, 2016  Content Version: 11.1  © 1980-7188 [employer]. Care instructions adapted under license by [employer] (which disclaims liability or warranty for this information). If you have questions about a medical condition or this instruction, always ask your healthcare professional. [address] any warranty or liability for your use of this information.

## 2017-02-21 ENCOUNTER — OFFICE VISIT (OUTPATIENT)
Dept: FAMILY MEDICINE CLINIC | Age: 67
End: 2017-02-21

## 2017-02-21 VITALS
BODY MASS INDEX: 34.8 KG/M2 | OXYGEN SATURATION: 100 % | RESPIRATION RATE: 20 BRPM | TEMPERATURE: 96.2 F | DIASTOLIC BLOOD PRESSURE: 76 MMHG | HEIGHT: 69 IN | WEIGHT: 235 LBS | HEART RATE: 63 BPM | SYSTOLIC BLOOD PRESSURE: 134 MMHG

## 2017-02-21 DIAGNOSIS — R63.4 WEIGHT LOSS, ABNORMAL: ICD-10-CM

## 2017-02-21 DIAGNOSIS — E78.2 MIXED HYPERLIPIDEMIA: ICD-10-CM

## 2017-02-21 DIAGNOSIS — I10 ESSENTIAL HYPERTENSION: ICD-10-CM

## 2017-02-21 DIAGNOSIS — I48.0 PAROXYSMAL ATRIAL FIBRILLATION (HCC): ICD-10-CM

## 2017-02-21 DIAGNOSIS — R35.1 NOCTURIA: ICD-10-CM

## 2017-02-21 DIAGNOSIS — Z87.448 HX OF RENAL FAILURE: ICD-10-CM

## 2017-02-21 DIAGNOSIS — L72.0 EPIDERMAL INCLUSION CYST: ICD-10-CM

## 2017-02-21 DIAGNOSIS — E55.9 VITAMIN D DEFICIENCY: ICD-10-CM

## 2017-02-21 DIAGNOSIS — R63.0 LACK OF APPETITE: ICD-10-CM

## 2017-02-21 DIAGNOSIS — D64.89 ANEMIA DUE TO OTHER CAUSE: ICD-10-CM

## 2017-02-21 NOTE — PROGRESS NOTES
Sharyn Coleman is a 77 y.o. male and presents with Follow-up       Subjective:  Mr. Micheal Tobar is here today to follow up on his chronic medical conditions and review labs. 1. Lack of Appetite and Weight Loss:  - Weight seems to have stabilized since 11/2016 ranging from 233-238lbs. He was 233lbs during his last ov on 1/24/17 and he is 235lbs today. When I first started seeing him he was losing 8 lbs per week and then 14 lbs over 3 weeks etc. Appetite is better, but diet is limited by coumadin restrictions. The bad taste in his mouth is much less prominent. Vitamin C was stopped because, per the Pharm D I consulted, it can sometimes cause patient's to have a taste disturbance. Mr. Micheal Tobar thinks stopping the Vitamin C helped a lot. We did a trial off of his Norvasc to see if that was contributing to the taste disturbance, but patient doesn't feel it made much difference. He is now back on Norvasc.  - Patient was referred to Heme/Onc (Dr. Shelli Sinha) for his anemia and abnormal SPEP/UPEP. Diagnosed with Autoimmune Hemolytic Anemia (JAYMIE positive). Patient has had infusions of IVIG, CT chest/abdomen/pelvis, bone marrow biopsy  - Patient was referred to GI to evaluate weight loss and pancreatic pseudocyts. Note reviewed. Patient declined EUS with FNA for pancreatic cyst due to risk complication. He was going to have a KUB for his complaint of chronic constipation. Apparently, an EGD was scheduled on 12/14/16 at [facility], but patient canceled after he was told that there is a risk of pancreatitis with EGDs. He is terrified of getting pancreatitis again and the idea of possibly being readmitted to the hospital is something he cannot handle right now. - CT Chest/Abdomen/Pelvis done on 10/19 - see below. - Renal US done for renal cysts seen on CT - Bilateral simple appearing renal cysts  - PSA 0.5. TFTs wnl. ALEXANDRA done and stool was heme (-). Colonoscopy done in 1/2015 = normal CRC screening colonoscopy      2.  Atrial Fibrillation  - Followed by Dr. Luba Roe and recently had a stress test. On Metoprolol BID and coumadin. He will return to see Dr. Sylvester Hirsch in 3 months. INR checks per coumadin clinic. Current dose of coumadin is 6 mg Mon-Thurs and 9 mg on Friday. 3. Diabetes  - Last A1c 7.1. Followed by Dr. Nayeli Alford. Still on Levemir - instructed to go up to 36 units. He was also given a patch to monitor his blood sugar at night. Will go back to see him next week. Microalbumin/Cr ratio 814. Patient is not on ACE-I; vasotec was stopped in the hospital and thought to have possibly caused his pancreatitis. Saw Dr. Neo Pandya in 12/2016 - dx with mild background retinopathy and will follow up in 1 year. Patient has a Podiatrist that he saw this year - asked him to bring in a copy of last office note.       4. Nocturia  - Evaluated by Dr. Jasper Kilpatrick and did a voiding diary for three days. Patient advised to limit fluids into PM and elevate legs in afternoon. Dr. Master Almonte note said he would consider an anticholinergic qhs if nocturia is persistent and more bothersome. Patient is frustrated that his symptoms have not improved. He typically has to get up to use the restroom around 5 am and then he has to go often between 5-8 am. Things seem to slow down after 8 am. He admits that cutting down on the amount of water he drinks at night before bed helps. He would like to see a different Urologist.       5. Anemia  - Patient never had anemia prior to being hospitalized. Peripheral smear = normocytic, normochromic anemia with adequate platelets. Iron/ferritin studies looked like ACD (Chronic disease from what? DM?); Stool heme (-) and last colonoscopy was normal in 2015; B12 1239; He was taking folic acid but stopped.    - He has been evaluated by Heme/Onc - Dr. Reg Harley. Notes reviewed. AIHA resolved. Extensive work up done including blood work, imaging, and bone marrow biopsy. Still no definitive explanation for the anemia.  Patient will return in 4 months.       6. HTN  - /76 pretty good today. On Metoprolol 50 mg BID and Norvasc 5 mg daily. No dizziness or lightheadedness.        7. Renal Failure  - Resolved. Cr down to 0.76. Saw Nephrology on 10/10/16 - kidneys doing well. He will go back to see Dr. Sonal Underwood in June 2017.      8. Sebaceous Cyst on Back  - Patient evaluated by General Surgery. Per Dr. Joaquin Archer note - patient underwent an outpatient excision of a chronically infected ruptured epidermal inclusion cyst. He had a home health nurse coming to his home to change the packing and dressing and assess for signs of infection. He had a follow up appointment with the surgeon on 2/14/17 and the notes states that the wound has healed nicely. 9. Hyperlipidemia - Last FLP from 12/2016 showed improvement. Patient has refused a statin in the past due to his concern over potential side effects. Total cholesterol 128, , HDL still low at 36 and LDL down to 65.2    10. Vitamin D Deficiency - Last level good at 40.8. Patient is on a daily supplement.        Health Maintenance:  PSA - 0.5 on 10/3/16  Colonoscopy - Done 1/2015. Normal  Eye Exam - Done with Dr. Cassandra Kowalski in 12/2016    ROS:  Pt denies: Fever/Chills, HA, Visual changes, Fatigue, Chest pain, SOB, LOCKETT, Abd pain, N/V/D/C, Blood in stool or urine, Edema. Pertinent positive as above in HPI. All others negative. (+) Weight Loss has stabilized    The problem list was updated as a part of today's visit.   Patient Active Problem List   Diagnosis Code    Hypertension I10    Hyperlipidemia E78.5    Vitamin D deficiency E55.9    Sleep apnea G47.30    Tobacco abuse Z72.0    Stasis dermatitis I83.10    Internal hemorrhoid K64.8    Microalbuminuria R80.9    Insulin dependent type 2 diabetes mellitus, uncontrolled (HCC) E11.65, Z79.4    Testicular pain N50.819    DDD (degenerative disc disease), lumbar M51.36    Paroxysmal atrial fibrillation (HCC) I48.0    Acute diastolic CHF (congestive heart failure) ([facility]) I50.31    Nocturia R35.1       The PSH, FH were reviewed. SH:  Social History   Substance Use Topics    Smoking status: Former Smoker     Packs/day: 1.00     Years: 42.00     Types: Cigarettes     Quit date: 9/22/2016    Smokeless tobacco: Never Used    Alcohol use Yes      Comment: occasional         Medications/Allergies:  Current Outpatient Prescriptions on File Prior to Visit   Medication Sig Dispense Refill    ZINC PO Take  by mouth. Take 1 tablet by mouth daily.  insulin detemir (LEVEMIR FLEXTOUCH) 100 unit/mL (3 mL) inpn Inject 30 units subcutaneously in the evening 15 mL 5    amLODIPine (NORVASC) 5 mg tablet Take 1 Tab by mouth daily. 30 Tab 5    warfarin (COUMADIN) 3 mg tablet Take 1 Tab by mouth daily. (Patient taking differently: Take 6 mg by mouth daily. Patient taking 1.5 tabs po daily) 10 Tab 0    metoprolol tartrate (LOPRESSOR) 50 mg tablet Take 1 Tab by mouth two (2) times a day. 60 Tab 2    therapeutic multivitamin (THERA) tablet Take 1 Tab by mouth daily.  Insulin Needles, Disposable, (NOVOFINE 32) 32 gauge x 1/4\" ndle USE AS DIRECTED WITH LEVEMIR DAILY 100 Pen Needle 11    Lancets misc Dispense Accu Check Multi-Clix Drum - use to check blood sugar bid or as directed - 90 day supply 100 Each 3    glucose blood VI test strips (BLOOD GLUCOSE TEST) strip Test strips of patient choice - use to check blood sugar bid or as directed 200 Strip 3    Blood-Glucose Meter (ACCU-CHEK BRY) misc by Does Not Apply route.  glucose blood VI test strips (ACCU-CHEK SMARTVIEW) strip by Does Not Apply route See Admin Instructions.  Cholecalciferol, Vitamin D3, (VITAMIN D3) 2,000 unit cap daily. No current facility-administered medications on file prior to visit.            Allergies   Allergen Reactions    Iodine Other (comments)    Vasotec [Enalapril Maleate] Other (comments)     Thought to have possibly caused his pancreatitis       Objective:  Visit Vitals    /76 (BP 1 Location: Left arm, BP Patient Position: Sitting)    Pulse 63    Temp 96.2 °F (35.7 °C) (Oral)    Resp 20    Ht 5' 9\" (1.753 m)    Wt 235 lb (106.6 kg)    SpO2 100%  Comment: room air    BMI 34.7 kg/m2    Body mass index is 34.7 kg/(m^2). Appearance: Alert and oriented. No acute distress.    HEENT: NC/AT. Conjunctiva normal. Anicteric sclerae. PERRL, EOMI. TMs clear. Oropharynx clear and moist.    Neck: Neck supple. No JVD. No cervical LAD. Heart: Rhythm sounds regular today without M/R/G. Intact distal pulses. Lungs: CTAB, no rhonchi, rales, crackles or wheezes with good air exchange  Abdomen: Obese, soft, normoactive BS, non tender, non distended, no palpable masses.    Back: Well healed linear surgical scar noted on mid-upper back 2/2 excision of epidermal inclusion cyst.   Ext: No cyanosis. LEs with shiny, hyperpigmented skin. Feet warm and well perfused with palpable dp pulses. No edema. Neuro: No focal deficits. Speech normal  Psych: Mood good today.  Short-term memory intact.      Labwork and Ancillary Studies:    CBC w/Diff  Lab Results   Component Value Date/Time    WBC 11.1 10/25/2016 12:06 PM    HGB 8.0 10/25/2016 12:06 PM    PLATELET 810 79/16/6861 12:06 PM         Basic Metabolic Profile  Lab Results   Component Value Date/Time    Sodium 138 12/21/2016 08:33 AM    Potassium 3.9 12/21/2016 08:33 AM    Chloride 100 12/21/2016 08:33 AM    CO2 31 12/21/2016 08:33 AM    Anion gap 7 12/21/2016 08:33 AM    Glucose 171 12/21/2016 08:33 AM    BUN 12 12/21/2016 08:33 AM    Creatinine 0.76 12/21/2016 08:33 AM    BUN/Creatinine ratio 16 12/21/2016 08:33 AM    GFR est AA >60 12/21/2016 08:33 AM    GFR est non-AA >60 12/21/2016 08:33 AM    Calcium 8.5 12/21/2016 08:33 AM        Cholesterol  Lab Results   Component Value Date/Time    Cholesterol, total 128 12/21/2016 08:33 AM    HDL Cholesterol 36 12/21/2016 08:33 AM    LDL, calculated 65.2 12/21/2016 08:33 AM    Triglyceride 134 12/21/2016 08:33 AM    CHOL/HDL Ratio 3.6 12/21/2016 08:33 AM     CT Chest/Abdomen/Pelvis 10/2016:  Impression:   1.  Large pseudocyst in the region of the body and tail of the pancreas.  Lesion is characterized by a thick wall and inspissated material internally. 2.  Atrophied pancreas post pancreatitis. 3.  Bilateral renal cysts.  One of the cysts has become a hyperdense cyst since the last exam and is located in the lateral cortex of the left kidney. 4.  Small likely effusion. Renal US 10/2016:  Right kidney measures 13 cm in length with mildly increased echogenicity. There  are 2 simple appearing cyst in the mid and upper poles measuring up to 1.7 x 2.3  x 2.2 cm in the medial upper pole. No solid mass lesion or hydronephrosis.     Left kidney measures 12.7 cm in length also with mild increased echogenicity. 2  simple cysts are noted in the interpolar region measuring up to 2.3 x 1.3 x 2.3  cm. No solid mass or evidence of hydronephrosis.     IMPRESSION  Impression: Bilateral simple appearing renal cysts as above. CT Chest/Abdomen/Pelvis 12/2016:   1.  Decreased size of pancreatic tail likely infected pseudocyst or walled off necrosis.  Similar to improved maria de jesus-splenic phlegmonous tissue. 2.  Anemia. 3.  Mild emphysema and bronchitis. 4.  Decreased trace left pleural effusion. 5.  Unchanged Likely hemorrhagic/proteinaceous renal cysts bilaterally. 6.  Mild ectasia infrarenal aorta up to 2.7 cm.  7.  Decreased hyperdense right upper back skin nodule, possibly an improving from sebaceous cyst.    Assessment/Plan:      ICD-10-CM ICD-9-CM    1. Weight loss, abnormal R63.4 783.21 Weight loss appears to have stabilized and appetite is much improved. Will continue to monitor. Workup still ongoing. Patient asked to follow up with GI to see what's next since he refused EUS and EGD. I don't think he ever had KUB or GES. See HPI. 2. Lack of appetite R63.0 783.0 See above.     3. Essential hypertension I10 401.9 BP okay today. Continue Metoprolol and Norvasc. 4. Insulin dependent type 2 diabetes mellitus, uncontrolled ([facility] [address] E11.65 250.02 Followed by [place]. Continue Levemir for now and may add some meal time insulin. Patient very reluctant to try any DM medications other than insulin. Microalbuminuria present - not on ACE-I as it was thought to have contributed to his pancreatitis. Z79.4 V58.67    5. Paroxysmal atrial fibrillation (HCC) I48.0 427.31 Followed by Dr. Penn. On BB and Coumadin. INR checks with coumadin clinic   6. Anemia due to other cause D64.89  Followed by Heme/Onc. Notes reviewed. H/H showed improvement on labs from 1/2017. Return to Dr. Kathy Pino in 4 months. 7. Mixed hyperlipidemia E78.2 272.2 Last FLP improved. Patient refusing statin and says he understands the risks. Will check again in 3 months. 8. Vitamin D deficiency E55.9 268.9 Level good. Continue daily supplement. 9. Nocturia R35.1 788.43 URINALYSIS W/ RFLX MICROSCOPIC      REFERRAL TO UROLOGY   10. Hx of renal failure Z87.448 V13.09 Resolved. Will see Renal again in June 11. Epidermal inclusion cyst L72.0 706.2 Incised and drained by Surgery. Well healed. Follow-up Disposition:  Return in about 3 months (around 5/21/2017). Fasting labs at the end of next month. I have discussed the diagnosis with the patient and the intended plan as seen in the above orders. The patient has received an After-Visit Summary and questions were answered concerning future plans. Patient verbalized understanding of above instructions.     Health Maintenance:   Health Maintenance   Topic Date Due    DTaP/Tdap/Td series (1 - Tdap) 05/16/1971    Pneumococcal 65+ High/Highest Risk (1 of 2 - PCV13) 05/16/2015    FOOT EXAM Q1  10/14/2016    HEMOGLOBIN A1C Q6M  06/21/2017    Prostate-Specific Antigen  10/03/2017    MEDICARE YEARLY EXAM  12/14/2017    EYE EXAM RETINAL OR DILATED Q1  12/15/2017    MICROALBUMIN Q1  12/21/2017    LIPID PANEL Q1  12/21/2017    GLAUCOMA SCREENING Q2Y  12/15/2018    COLONOSCOPY  01/13/2020    Hepatitis C Screening  Completed    ZOSTER VACCINE AGE 60>  Addressed    INFLUENZA AGE 9 TO ADULT  Addressed       No orders of the defined types were placed in this encounter.

## 2017-02-21 NOTE — PROGRESS NOTES
Pt here today for 1 month follow up for chronic conditions for hypertension    1. Have you been to the ER, urgent care clinic since your last visit? Hospitalized since your last visit? No    2. Have you seen or consulted any other health care providers outside of the [address] [place] [address] since your last visit? Include any pap smears or colon screening.  No

## 2017-02-21 NOTE — MR AVS SNAPSHOT
Visit Information Date & Time Provider Department Dept. Phone Encounter #  
 2/21/2017  8:45 AM Alex Marie, [address] [facility] [phone] [id number] Follow-up Instructions Return in about 3 months (around 5/21/2017) for Fasting labs at the end of next month. Your Appointments 12/18/2017 10:00 AM  
Office Visit with Alex Marie MD  
[address] Appt Note:   
 [address]  
  
   
 [address] Upcoming Health Maintenance Date Due DTaP/Tdap/Td series (1 - Tdap) 5/16/1971 Pneumococcal 65+ High/Highest Risk (1 of 2 - PCV13) 5/16/2015 FOOT EXAM Q1 10/14/2016 HEMOGLOBIN A1C Q6M 6/21/2017 Prostate-Specific Antigen 10/3/2017 MEDICARE YEARLY EXAM 12/14/2017 EYE EXAM RETINAL OR DILATED Q1 12/15/2017 MICROALBUMIN Q1 12/21/2017 LIPID PANEL Q1 12/21/2017 GLAUCOMA SCREENING Q2Y 12/15/2018 COLONOSCOPY 1/13/2020 Allergies as of 2/21/2017  Review Complete On: 2/21/2017 By: Madhav Hathaway LPN Severity Noted Reaction Type Reactions Iodine  07/27/2015    Other (comments) Vasotec [Enalapril Maleate]  12/16/2016    Other (comments) Thought to have possibly caused his pancreatitis Current Immunizations  Reviewed on 1/17/2017 Name Date Influenza High Dose Vaccine PF 10/22/2015 Influenza Vaccine 12/1/2014 Zoster Vaccine, Live 1/1/2015 Not reviewed this visit You Were Diagnosed With   
  
 Codes Comments Nocturia    -  Primary ICD-10-CM: R35.1 ICD-9-CM: 788.43 Vitals BP Pulse Temp Resp Height(growth percentile) Weight(growth percentile) 134/76 (BP 1 Location: Left arm, BP Patient Position: Sitting) 63 96.2 °F (35.7 °C) (Oral) 20 5' 9\" (1.753 m) 235 lb (106.6 kg) SpO2 BMI Smoking Status 100% 34.7 kg/m2 Former Smoker Vitals History BMI and BSA Data Body Mass Index Body Surface Area 34.7 kg/m 2 2.28 m 2 Preferred Pharmacy Pharmacy Name Phone [facility] [address] [phone] Your Updated Medication List  
  
   
This list is accurate as of: 2/21/17 10:03 AM.  Always use your most recent med list.  
  
  
  
  
 Bren Amezcua Generic drug:  Blood-Glucose Meter  
by Does Not Apply route. * ACCU-CHEK SMARTVIEW TEST STRIP strip Generic drug:  glucose blood VI test strips  
by Does Not Apply route See Admin Instructions. * glucose blood VI test strips strip Commonly known as:  blood glucose test  
Test strips of patient choice - use to check blood sugar bid or as directed  
  
 amLODIPine 5 mg tablet Commonly known as:  Senait Bejarano Take 1 Tab by mouth daily. insulin detemir 100 unit/mL (3 mL) Inpn Commonly known as:  Eugenie Dos Santos Inject 30 units subcutaneously in the evening Insulin Needles (Disposable) 32 gauge x 1/4\" Ndle Commonly known as:  NOVOFINE 32 USE AS DIRECTED WITH LEVEMIR DAILY Lancets [facility] [place] [place] Dispense Accu Check Multi-Clix Drum - use to check blood sugar bid or as directed - 90 day supply  
  
 metoprolol tartrate 50 mg tablet Commonly known as:  LOPRESSOR Take 1 Tab by mouth two (2) times a day. THERA tablet Generic drug:  therapeutic multivitamin Take 1 Tab by mouth daily. VITAMIN D3 2,000 unit Cap capsule Generic drug:  Cholecalciferol (Vitamin D3)  
daily. warfarin 3 mg tablet Commonly known as:  COUMADIN Take 1 Tab by mouth daily. ZINC PO Take  by mouth. Take 1 tablet by mouth daily. * Notice: This list has 2 medication(s) that are the same as other medications prescribed for you. Read the directions carefully, and ask your doctor or other care provider to review them with you. Follow-up Instructions Return in about 3 months (around 5/21/2017) for Fasting labs at the end of next month. To-Do List   
 02/21/2017 Lab:  URINALYSIS W/ RFLX MICROSCOPIC Introducing [facility] SERVICES! [employer] introduces [employer] patient portal. Now you can access parts of your medical record, email your doctor's office, and request medication refills online. 1. In your internet browser, go to [email or web address] 2. Click on the First Time User? Click Here link in the Sign In box. You will see the New Member Sign Up page. 3. Enter your [employer] Access Code exactly as it appears below. You will not need to use this code after youve completed the sign-up process. If you do not sign up before the expiration date, you must request a new code. · [employer] Access Code: [email or web address] Expires: 4/4/2017  9:59 AM 
 
4. Enter the last four digits of your Social Security Number (xxxx) and Date of Birth (mm/dd/yyyy) as indicated and click Submit. You will be taken to the next sign-up page. 5. Create a [employer] ID. This will be your [employer] login ID and cannot be changed, so think of one that is secure and easy to remember. 6. Create a [employer] password. You can change your password at any time. 7. Enter your Password Reset Question and Answer. This can be used at a later time if you forget your password. 8. Enter your e-mail address. You will receive e-mail notification when new information is available in [address] 9. Click Sign Up. You can now view and download portions of your medical record. 10. Click the Download Summary menu link to download a portable copy of your medical information. If you have questions, please visit the Frequently Asked Questions section of the [employer] website. Remember, [employer] is NOT to be used for urgent needs. For medical emergencies, dial 911. Now available from your iPhone and Android! Please provide this summary of care documentation to your next provider. Your primary care clinician is listed as Albin Gaspar. If you have any questions after today's visit, please call [phone].

## 2017-02-22 ENCOUNTER — HOSPITAL ENCOUNTER (OUTPATIENT)
Dept: LAB | Age: 67
Discharge: HOME | End: 2017-02-22
Payer: MEDICARE

## 2017-02-22 DIAGNOSIS — R35.1 NOCTURIA: ICD-10-CM

## 2017-02-22 LAB
APPEARANCE UR: CLEAR
BACTERIA URNS QL MICRO: NEGATIVE /HPF
BILIRUB UR QL: NEGATIVE
COLOR UR: YELLOW
EPITH CASTS URNS QL MICRO: NORMAL /LPF (ref 0–5)
GLUCOSE UR STRIP.AUTO-MCNC: NEGATIVE MG/DL
HGB UR QL STRIP: NEGATIVE
KETONES UR QL STRIP.AUTO: NEGATIVE MG/DL
LEUKOCYTE ESTERASE UR QL STRIP.AUTO: NEGATIVE
NITRITE UR QL STRIP.AUTO: NEGATIVE
PH UR STRIP: 5.5 [PH] (ref 5–8)
PROT UR STRIP-MCNC: 100 MG/DL
RBC #/AREA URNS HPF: NEGATIVE /HPF (ref 0–5)
SP GR UR REFRACTOMETRY: 1.02 (ref 1–1.03)
UROBILINOGEN UR QL STRIP.AUTO: 0.2 EU/DL (ref 0.2–1)
WBC URNS QL MICRO: NORMAL /HPF (ref 0–4)

## 2017-02-22 PROCEDURE — 81001 URINALYSIS AUTO W/SCOPE: CPT | Performed by: CLINIC/CENTER

## 2017-02-23 ENCOUNTER — OFFICE VISIT (OUTPATIENT)
Dept: FAMILY MEDICINE CLINIC | Age: 67
End: 2017-02-23

## 2017-02-23 VITALS
DIASTOLIC BLOOD PRESSURE: 58 MMHG | RESPIRATION RATE: 16 BRPM | HEIGHT: 69 IN | TEMPERATURE: 97.1 F | SYSTOLIC BLOOD PRESSURE: 117 MMHG | HEART RATE: 61 BPM

## 2017-02-23 DIAGNOSIS — H61.23 BILATERAL IMPACTED CERUMEN: Primary | ICD-10-CM

## 2017-02-28 PROBLEM — D64.9 ANEMIA: Status: ACTIVE | Noted: 2017-02-28

## 2017-03-14 ENCOUNTER — TELEPHONE (OUTPATIENT)
Dept: FAMILY MEDICINE CLINIC | Age: 67
End: 2017-03-14

## 2017-03-14 NOTE — TELEPHONE ENCOUNTER
Patient called. ... 1. OTC Urinozinc for prostate health and Prosvent for prostate. Would like Dr. Astrid Cash opinion before he starts taking both or if he should not take it. 2. Patient was referred to Dr. Tiumr Qiu urologist. Dr. Yrn Trujillo office is in [facility] and patient stated that was too far to drive. He is requesting to be referred to another urologist in the [place] area.

## 2017-03-27 ENCOUNTER — HOSPITAL ENCOUNTER (OUTPATIENT)
Dept: LAB | Age: 67
Discharge: HOME | End: 2017-03-27
Payer: MEDICARE

## 2017-03-27 DIAGNOSIS — I10 ESSENTIAL HYPERTENSION: ICD-10-CM

## 2017-03-27 DIAGNOSIS — E78.2 MIXED HYPERLIPIDEMIA: ICD-10-CM

## 2017-03-27 LAB
ALBUMIN SERPL BCP-MCNC: 2.9 G/DL (ref 3.4–5)
ALBUMIN/GLOB SERPL: 0.6 {RATIO} (ref 0.8–1.7)
ALP SERPL-CCNC: 80 U/L (ref 45–117)
ALT SERPL-CCNC: 16 U/L (ref 16–61)
ANION GAP BLD CALC-SCNC: 10 MMOL/L (ref 3–18)
AST SERPL W P-5'-P-CCNC: 15 U/L (ref 15–37)
BILIRUB SERPL-MCNC: 0.3 MG/DL (ref 0.2–1)
BUN SERPL-MCNC: 20 MG/DL (ref 7–18)
BUN/CREAT SERPL: 22 (ref 12–20)
CALCIUM SERPL-MCNC: 8.4 MG/DL (ref 8.5–10.1)
CHLORIDE SERPL-SCNC: 103 MMOL/L (ref 100–108)
CHOLEST SERPL-MCNC: 139 MG/DL
CO2 SERPL-SCNC: 25 MMOL/L (ref 21–32)
CREAT SERPL-MCNC: 0.91 MG/DL (ref 0.6–1.3)
EST. AVERAGE GLUCOSE BLD GHB EST-MCNC: 220 MG/DL
GLOBULIN SER CALC-MCNC: 4.8 G/DL (ref 2–4)
GLUCOSE SERPL-MCNC: 138 MG/DL (ref 74–99)
HBA1C MFR BLD: 9.3 % (ref 4.2–5.6)
HDLC SERPL-MCNC: 40 MG/DL (ref 40–60)
HDLC SERPL: 3.5 {RATIO} (ref 0–5)
LDLC SERPL CALC-MCNC: 85.8 MG/DL (ref 0–100)
LIPID PROFILE,FLP: NORMAL
POTASSIUM SERPL-SCNC: 4 MMOL/L (ref 3.5–5.5)
PROT SERPL-MCNC: 7.7 G/DL (ref 6.4–8.2)
SODIUM SERPL-SCNC: 138 MMOL/L (ref 136–145)
TRIGL SERPL-MCNC: 66 MG/DL (ref ?–150)
VLDLC SERPL CALC-MCNC: 13.2 MG/DL

## 2017-03-27 PROCEDURE — 36415 COLL VENOUS BLD VENIPUNCTURE: CPT | Performed by: CLINIC/CENTER

## 2017-03-27 PROCEDURE — 83036 HEMOGLOBIN GLYCOSYLATED A1C: CPT | Performed by: CLINIC/CENTER

## 2017-03-27 PROCEDURE — 80053 COMPREHEN METABOLIC PANEL: CPT | Performed by: CLINIC/CENTER

## 2017-03-27 PROCEDURE — 80061 LIPID PANEL: CPT | Performed by: CLINIC/CENTER

## 2017-03-28 NOTE — PROGRESS NOTES
Results reviewed with the patient. A1c up as patient's appetite has improved and weight stabilized. Patient now followed by Endocrinology so will fax updated A1c to Dr. Karlos Bruner office so he will be aware of the results. CMP reviewed and corrected calcium is 9.3. Will repeat BMP at next appointment to reassess BUN as patient felt he may have been a bit dehydrated the morning he had his labs drawn. Regarding his FLP, the LDL is still less than 100, but has come up from 65.2 to 85.8. Patient refuses to take a statin as he is concerned about potential side effects. Discussed diet with the patient and foods to avoid to help control his cholesterol. He is also seeing a nutritionist now.

## 2017-05-09 ENCOUNTER — OFFICE VISIT (OUTPATIENT)
Dept: FAMILY MEDICINE CLINIC | Age: 67
End: 2017-05-09

## 2017-05-09 VITALS
DIASTOLIC BLOOD PRESSURE: 62 MMHG | BODY MASS INDEX: 34.36 KG/M2 | RESPIRATION RATE: 18 BRPM | SYSTOLIC BLOOD PRESSURE: 123 MMHG | HEIGHT: 69 IN | HEART RATE: 57 BPM | WEIGHT: 232 LBS | TEMPERATURE: 96.9 F

## 2017-05-09 DIAGNOSIS — R60.0 EDEMA OF RIGHT LOWER EXTREMITY: ICD-10-CM

## 2017-05-09 DIAGNOSIS — R63.4 WEIGHT LOSS: Primary | ICD-10-CM

## 2017-05-09 DIAGNOSIS — E55.9 VITAMIN D DEFICIENCY: ICD-10-CM

## 2017-05-09 DIAGNOSIS — G47.33 OBSTRUCTIVE SLEEP APNEA SYNDROME: ICD-10-CM

## 2017-05-09 DIAGNOSIS — I10 ESSENTIAL HYPERTENSION: ICD-10-CM

## 2017-05-09 DIAGNOSIS — I48.0 PAROXYSMAL ATRIAL FIBRILLATION (HCC): ICD-10-CM

## 2017-05-09 DIAGNOSIS — R09.89 DECREASED DORSALIS PEDIS PULSE: ICD-10-CM

## 2017-05-09 DIAGNOSIS — E78.2 MIXED HYPERLIPIDEMIA: ICD-10-CM

## 2017-05-09 DIAGNOSIS — D64.9 NORMOCYTIC NORMOCHROMIC ANEMIA: ICD-10-CM

## 2017-05-09 DIAGNOSIS — R35.1 NOCTURIA: ICD-10-CM

## 2017-05-09 RX ORDER — INSULIN ASPART 100 [IU]/ML
10-15 INJECTION, SOLUTION INTRAVENOUS; SUBCUTANEOUS
COMMUNITY
End: 2021-01-01

## 2017-05-09 NOTE — PROGRESS NOTES
Faviola Goddard is a 77 y.o. male and presents with Follow Up Chronic Condition (3 month)       Subjective:  Mr. Cha Shen is here today to follow up on his chronic medical conditions.     1. Lack of Appetite and Weight Loss:  - Weight has stabilized. Patient was 233lbs in 1/2017 and 232lbs today. He is now exercising and walking at least 4x/week. Appetite is much better, but diet is limited by coumadin restrictions. The bad taste in his mouth has resolved. - Patient was referred to Heme/Onc (Dr. Virgene Kehr) for his anemia and abnormal SPEP/UPEP. Diagnosed with Autoimmune Hemolytic Anemia (JAYMIE positive). Patient had infusions of IVIG, CT chest/abdomen/pelvis, bone marrow biopsy. Per Dr. Juan Simms note - AIHA responded to the IVIG and resolved. Regarding the normocytic anemia, Dr. Shaniqua Bedoya said he could not find out the cause of the anemia despite an extensive evaluation and plans to follow Mr. Cha Shen every four months. Patient says he will go back to Dr. Shanqiua Bedoya in June or July. - Patient was referred to GI to evaluate weight loss and pancreatic pseudocyts. Patient declined EUS with FNA for pancreatic cyst due to risk complication. He was going to have a KUB for his complaint of chronic constipation. Apparently, an EGD was scheduled on 12/14/16 at [facility], but patient canceled after he was told that there is a risk of pancreatitis with EGDs. - CT Chest/Abdomen/Pelvis done on 10/19/16. and then repeated by Heme/onc in 12/2016.  - Renal US done for renal cysts seen on CT - Bilateral simple appearing renal cysts  - PSA 0.5. TFTs wnl. ALEXANDRA done and stool was heme (-). Colonoscopy done in 1/2015 = normal CRC screening colonoscopy      2. Atrial Fibrillation  - Followed by Dr. Laron Lugo and saw him again last week. On Metoprolol BID and coumadin. Patient really wants to come off of the coumadin so Dr. Teofilo Stubbs is going to have him wear a Holter Monitor for about a week so he can see if the Afib is still present.  If the arrhythmia has resolved, he can stop the coumadin. INR checks per coumadin clinic. Current dose of coumadin is 3 mg on Mondays and Fridays and 2mg on all the other days. 3. Diabetes  - Last A1c 9.3. Followed by Endocrine, Dr. Maciel Boyer. Current insulin regimen - 28 units of Levemir at night and Novolog with meals (10 units at breakfast, 12 units at lunch and 14 units at dinner). He is checking his bg often and is concerned because there have been a couple of times when his blood sugar was in the 90s before bed. He is not sure what to do with his Levemir if that happens again. I suggested he have something to eat and give himself half of his normal Levemir dose if his bg is on the lower side at night. I also wonder if his evening Novolog dose may be too high. I suggested he take 10 units of Novolog at dinner (like he does with breakfast) and see if that helps. I also told him to call Dr. Lashanda Dominguez today and let him know that he has had bg in the 90s at night on a couple of occasions and see what he recommends. He also needs to discuss with Dr. Norman Swift possibly reducing his evening Novolog dose. Patient verbalized understanding and agreed to call today. Next appointment with Endocrine is on 5/31/17. Last Microalbumin/Cr ratio 814. Patient is not on ACE-I; vasotec was stopped in the hospital and thought to have possibly caused his pancreatitis. Dr. Joanna Harris in 12/2016 - dx with mild background retinopathy and will follow up in 1 year. Patient saw his Podaitrist, Dr. Cherelle Ryan in 2016 - asked him to bring in a copy of last office note.       4. Nocturia  - He saw Dr. Shawnee Card and did a voiding diary for three days. Patient advised to limit fluids into PM and elevate legs in afternoon. Dr. Juan Jose cK note said he would consider an anticholinergic qhs if nocturia is persistent and more bothersome.  Today, patient reports that his symptoms are better, but he still wants to see a different Urologist. He plans to call me back with the name of a doctor that was recommended to him.       5. Anemia  - Patient never had anemia prior to being hospitalized. Peripheral smear = normocytic, normochromic anemia with adequate platelets. Iron/ferritin studies looked like ACD (perhaps from DM?); Stool heme (-) and last colonoscopy was normal in 2015; B12 1239; He was taking folic acid but stopped. Patient evaluated by Heme/Onc (Dr. Susi Kendall) as noted above. AIHA resolved. Extensive work up done including blood work, imaging, and bone marrow biopsy. Still no definitive explanation for the anemia. Patient will be monitored every 4 months.       6. HTN  - /62. On Metoprolol 50 mg BID and Norvasc 5 mg daily. No dizziness or lightheadedness.        7. Hx of Renal Failure  - Resolved. BUN/Cr 20/0. 91. He will go back to see Dr. Dionna Bonilla in June 2017.       8. Hx of Sebaceous Cyst on Back  - Resolved. Underwent an outpatient excision of a chronically infected ruptured epidermal inclusion cyst. Surgical site well healed.      9. Hyperlipidemia -  Patient has refused a statin in the past due to his concern over potential side effects. Last LDL 85.8 and HDL 40.     10. Vitamin D Deficiency - Last level good at 40.8. Patient is on a daily supplement. 11. YASEMIN - Patient going back to the sleep clinic to see if he still needs the CPAP since he has lost weight.       Health Maintenance:  PSA - 0.5 on 10/3/16  Colonoscopy - Done 1/2015. Normal  Eye Exam - Done with Dr. No Savage in 12/2016       ROS:  Pt denies: Wt loss, Fever/Chills, HA, Visual changes, Fatigue, Chest pain, SOB, LOCKETT, Abd pain, N/V/D/C, Blood in stool or urine, Edema. Pertinent positive as above in HPI. All others negative. The problem list was updated as a part of today's visit.   Patient Active Problem List   Diagnosis Code    Hypertension I10    Hyperlipidemia E78.5    Vitamin D deficiency E55.9    Sleep apnea G47.30    Tobacco abuse Z72.0    Stasis dermatitis I87.2    Internal hemorrhoid K64.8    Microalbuminuria R80.9    Insulin dependent type 2 diabetes mellitus, uncontrolled (HCC) E11.65, Z79.4    Testicular pain N50.819    DDD (degenerative disc disease), lumbar M51.36    Paroxysmal atrial fibrillation (HCC) I48.0    Acute diastolic CHF (congestive heart failure) (HCC) I50.31    Nocturia R35.1    Anemia D64.9       The PSH, FH were reviewed. SH:  Social History   Substance Use Topics    Smoking status: Former Smoker     Packs/day: 1.00     Years: 42.00     Types: Cigarettes     Quit date: 9/22/2016    Smokeless tobacco: Never Used    Alcohol use Yes      Comment: occasional         Medications/Allergies:  Current Outpatient Prescriptions on File Prior to Visit   Medication Sig Dispense Refill    metoprolol tartrate (LOPRESSOR) 50 mg tablet TAKE ONE TABLET BY MOUTH TWICE DAILY 60 Tab 2    ZINC PO Take  by mouth. Take 1 tablet by mouth daily.  insulin detemir (LEVEMIR FLEXTOUCH) 100 unit/mL (3 mL) inpn Inject 30 units subcutaneously in the evening (Patient taking differently: Inject 28 units subcutaneously in the evening) 15 mL 5    amLODIPine (NORVASC) 5 mg tablet Take 1 Tab by mouth daily. 30 Tab 5    warfarin (COUMADIN) 3 mg tablet Take 1 Tab by mouth daily. (Patient taking differently: Take 3 mg by mouth daily. 2 tablets 6 days a week, 3 tablets 1 day a week (FRIDAY)) 10 Tab 0    therapeutic multivitamin (THERA) tablet Take 1 Tab by mouth daily.  Insulin Needles, Disposable, (NOVOFINE 32) 32 gauge x 1/4\" ndle USE AS DIRECTED WITH LEVEMIR DAILY 100 Pen Needle 11    Lancets misc Dispense Accu Check Multi-Clix Drum - use to check blood sugar bid or as directed - 90 day supply 100 Each 3    glucose blood VI test strips (BLOOD GLUCOSE TEST) strip Test strips of patient choice - use to check blood sugar bid or as directed 200 Strip 3    Blood-Glucose Meter (ACCU-CHEK BRY) misc by Does Not Apply route.       glucose blood VI test strips (ACCU-CHEK SMARTVIEW) strip by Does Not Apply route See Admin Instructions.  Cholecalciferol, Vitamin D3, (VITAMIN D3) 2,000 unit cap daily. No current facility-administered medications on file prior to visit. Allergies   Allergen Reactions    Iodine Other (comments)    Vasotec [Enalapril Maleate] Other (comments)     Thought to have possibly caused his pancreatitis       Objective:  Visit Vitals    /62    Pulse (!) 57    Temp 96.9 °F (36.1 °C) (Oral)    Resp 18    Ht 5' 9\" (1.753 m)    Wt 232 lb (105.2 kg)    BMI 34.26 kg/m2    Body mass index is 34.26 kg/(m^2). Appearance: Alert and oriented. No acute distress.    HEENT: NC/AT. Conjunctiva normal. Anicteric sclerae. PERRL, EOMI. TMs clear. Oropharynx clear and moist.    Neck: Neck supple. Questionable slightly enlarged left cervical LN (will recheck at next visit)  Heart: Rhythm irregularly irregular today without M/R/G. Intact distal pulses. Lungs: CTAB, no rhonchi, rales, crackles or wheezes with good air exchange  Abdomen: Obese, soft, normoactive BS, non tender, non distended, no palpable masses.    Back: Well healed linear surgical scar noted on mid-upper back 2/2 excision of epidermal inclusion cyst.   Ext: No cyanosis. LEs with shiny, hyperpigmented skin. Feet warm and well perfused. Dp palpable in left foot, but diminished in the right foot. RLE somewhat enlarged (non pitting edema) compared to the left. No RLE pain, erythema, excess warmth or palpable cords. Neuro: No focal deficits. Speech normal  Psych: Mood good today. Short-term memory intact.     Labwork and Ancillary Studies:    CBC w/Diff  Lab Results   Component Value Date/Time    WBC 11.1 10/25/2016 12:06 PM    HGB 8.0 10/25/2016 12:06 PM    PLATELET 586 46/39/7019 12:06 PM         Basic Metabolic Profile  Lab Results   Component Value Date/Time    Sodium 138 03/27/2017 08:43 AM    Potassium 4.0 03/27/2017 08:43 AM    Chloride 103 03/27/2017 08:43 AM CO2 25 03/27/2017 08:43 AM    Anion gap 10 03/27/2017 08:43 AM    Glucose 138 03/27/2017 08:43 AM    BUN 20 03/27/2017 08:43 AM    Creatinine 0.91 03/27/2017 08:43 AM    BUN/Creatinine ratio 22 03/27/2017 08:43 AM    GFR est AA >60 03/27/2017 08:43 AM    GFR est non-AA >60 03/27/2017 08:43 AM    Calcium 8.4 03/27/2017 08:43 AM        Cholesterol  Lab Results   Component Value Date/Time    Cholesterol, total 139 03/27/2017 08:43 AM    HDL Cholesterol 40 03/27/2017 08:43 AM    LDL, calculated 85.8 03/27/2017 08:43 AM    Triglyceride 66 03/27/2017 08:43 AM    CHOL/HDL Ratio 3.5 03/27/2017 08:43 AM       Assessment/Plan:      ICD-10-CM ICD-9-CM    1. Weight loss R63.4 783.21 Weight loss has stabilized. Patient's appetite is much improved. Will continue to monitor. Patient was asked to follow up with GI to see if anything else needs to be done since he refused EUS and EGD. He has not contacted them yet. I don't think he ever had the KUB or GES. 2. Paroxysmal atrial fibrillation (HCC) I48.0 427.31 Followed by Dr. Teofilo Stubbs. On BB and Coumadin. INR checks with coumadin clinic. Dr. Teofilo Stubbs planning to try Holter monitor to see if Afib has resolved and patient could stop coumadin. 3. Insulin dependent type 2 diabetes mellitus, uncontrolled ([facility] [address] E11.65 250.02 Followed by Endocrine. Patient instructed to contact Dr. Lashanda Dominguez today regarding the concerns and medication adjustments we discussed (see HPI). Patient agreed to call today. (+) Microalbuminuria - not on ACE-I as it was thought to have contributed to his pancreatitis. Patient up to date with Ophthalmology and Podiatry evals. Z79.4 V58.67    4. Nocturia R35.1 788.43 Symptoms improved per patient. He is going to call with the name of a Urologist that was recommended to him. 5. Normocytic normochromic anemia D64.9 285.9 Followed by Heme/onc. 6. Essential hypertension I10 401.9 BP well controlled   7. Mixed hyperlipidemia E78.2 272.2 LDL < 100.  Patient refuses statin and states that he understands the risks. 8. Vitamin D deficiency E55.9 268.9 Continue daily supplement   9. Obstructive sleep apnea syndrome G47.33 327.23 Back to sleep clinic to see if CPAP is still necessary after significant weight loss. 10. Decreased dorsalis pedis pulse R09.89 785. 9 ANKLE BRACHIAL INDEX   11. Edema of right lower extremity R60.0 782.3 I noted that patient's RLE was enlarged back in 9/2016. PVL was negative at the time and the edema seemed to resolve. Today, his RLE again seems larger when compared to his left. Patient is on coumadin. However, due to the obvious change in his exam I will go ahead and repeat the PVL to ensure no clot is present. Patient agrees with this plan. DUPLEX LOWER EXT VENOUS RIGHT         Follow-up Disposition:  Return in about 2 months (around 7/9/2017) for 30 minute appointment to see me. Needs fasting lab appointment after 6/27/17. .    I have discussed the diagnosis with the patient and the intended plan as seen in the above orders. The patient has received an After-Visit Summary and questions were answered concerning future plans. Patient verbalized understanding of above instructions. Health Maintenance:   Health Maintenance   Topic Date Due    DTaP/Tdap/Td series (1 - Tdap) 05/16/1971    Pneumococcal 65+ High/Highest Risk (1 of 2 - PCV13) 05/16/2015    FOOT EXAM Q1  10/14/2016    INFLUENZA AGE 9 TO ADULT  08/01/2017    HEMOGLOBIN A1C Q6M  09/27/2017    Prostate-Specific Antigen  10/03/2017    MEDICARE YEARLY EXAM  12/14/2017    EYE EXAM RETINAL OR DILATED Q1  12/15/2017    MICROALBUMIN Q1  12/21/2017    LIPID PANEL Q1  03/27/2018    GLAUCOMA SCREENING Q2Y  12/15/2018    COLONOSCOPY  01/13/2020    Hepatitis C Screening  Completed    ZOSTER VACCINE AGE 60>  Addressed       No orders of the defined types were placed in this encounter.

## 2017-05-09 NOTE — PROGRESS NOTES
1. Have you been to the ER, urgent care clinic since your last visit? Hospitalized since your last visit? No    2. Have you seen or consulted any other health care providers outside of the [address] [place] [address] since your last visit? Include any pap smears or colon screening.  No

## 2017-05-09 NOTE — MR AVS SNAPSHOT
Visit Information Date & Time Provider Department Dept. Phone Encounter #  
 5/9/2017  9:00 AM Dalton Calix, [facility] [phone] [id number] Follow-up Instructions Return in about 2 months (around 7/9/2017) for 30 minute appointment to see me. Needs fasting lab appointment after 6/27/17. .  
  
Your Appointments 12/18/2017 10:00 AM  
Office Visit with Dalton Calix MD  
[address] Appt Note:   
 [address]  
  
   
 [address] Upcoming Health Maintenance Date Due DTaP/Tdap/Td series (1 - Tdap) 5/16/1971 Pneumococcal 65+ High/Highest Risk (1 of 2 - PCV13) 5/16/2015 FOOT EXAM Q1 10/14/2016 INFLUENZA AGE 9 TO ADULT 8/1/2017 HEMOGLOBIN A1C Q6M 9/27/2017 Prostate-Specific Antigen 10/3/2017 MEDICARE YEARLY EXAM 12/14/2017 EYE EXAM RETINAL OR DILATED Q1 12/15/2017 MICROALBUMIN Q1 12/21/2017 LIPID PANEL Q1 3/27/2018 GLAUCOMA SCREENING Q2Y 12/15/2018 COLONOSCOPY 1/13/2020 Allergies as of 5/9/2017  Review Complete On: 5/9/2017 By: Hector Sutherland LPN Severity Noted Reaction Type Reactions Iodine  07/27/2015    Other (comments) Vasotec [Enalapril Maleate]  12/16/2016    Other (comments) Thought to have possibly caused his pancreatitis Current Immunizations  Reviewed on 1/17/2017 Name Date Influenza High Dose Vaccine PF 10/22/2015 Influenza Vaccine 12/1/2014 Zoster Vaccine, Live 1/1/2015 Not reviewed this visit Vitals BP Pulse Temp Resp Height(growth percentile) Weight(growth percentile) 123/62 (!) 57 96.9 °F (36.1 °C) (Oral) 18 5' 9\" (1.753 m) 232 lb (105.2 kg) BMI Smoking Status 34.26 kg/m2 Former Smoker BMI and BSA Data Body Mass Index Body Surface Area  
 34.26 kg/m 2 2.26 m 2 Preferred Pharmacy Pharmacy Name Phone [facility] [address] [phone] Your Updated Medication List  
  
   
This list is accurate as of: 5/9/17 10:08 AM.  Always use your most recent med list.  
  
  
  
  
 Liang Generic drug:  Blood-Glucose Meter  
by Does Not Apply route. * ACCU-CHEK SMARTVIEW TEST STRIP strip Generic drug:  glucose blood VI test strips  
by Does Not Apply route See Admin Instructions. * glucose blood VI test strips strip Commonly known as:  blood glucose test  
Test strips of patient choice - use to check blood sugar bid or as directed  
  
 amLODIPine 5 mg tablet Commonly known as:  Adilene Kenia Take 1 Tab by mouth daily. insulin detemir 100 unit/mL (3 mL) Inpn Commonly known as:  Elesa Ohm Inject 30 units subcutaneously in the evening Insulin Needles (Disposable) 32 gauge x 1/4\" Ndle Commonly known as:  NOVOFINE 32 USE AS DIRECTED WITH LEVEMIR DAILY Lancets Misc Dispense Accu Check Multi-Clix Drum - use to check blood sugar bid or as directed - 90 day supply  
  
 metoprolol tartrate 50 mg tablet Commonly known as:  LOPRESSOR  
TAKE ONE TABLET BY MOUTH TWICE DAILY NovoLOG Flexpen 100 unit/mL Inpn Generic drug:  insulin aspart  
by SubCUTAneous route. 10 units subcutaneously 15 minutes before breakfast and 12 units 15 minutes before lunch and 14 units 15 minutes before dinner. THERA tablet Generic drug:  therapeutic multivitamin Take 1 Tab by mouth daily. VITAMIN D3 2,000 unit Cap capsule Generic drug:  Cholecalciferol (Vitamin D3)  
daily. warfarin 3 mg tablet Commonly known as:  COUMADIN Take 1 Tab by mouth daily. ZINC PO Take  by mouth. Take 1 tablet by mouth daily. * Notice: This list has 2 medication(s) that are the same as other medications prescribed for you.  Read the directions carefully, and ask your doctor or other care provider to review them with you. Follow-up Instructions Return in about 2 months (around 7/9/2017) for 30 minute appointment to see me. Needs fasting lab appointment after 6/27/17. .  
  
  
Introducing [facility] SERVICES! [employer] introduces [facility] patient portal. Now you can access parts of your medical record, email your doctor's office, and request medication refills online. 1. In your internet browser, go to [email or web address] 2. Click on the First Time User? Click Here link in the Sign In box. You will see the New Member Sign Up page. 3. Enter your [facility] Access Code exactly as it appears below. You will not need to use this code after youve completed the sign-up process. If you do not sign up before the expiration date, you must request a new code. · [facility] Access Code: [email or web address] Expires: 8/7/2017 10:07 AM 
 
4. Enter the last four digits of your Social Security Number (xxxx) and Date of Birth (mm/dd/yyyy) as indicated and click Submit. You will be taken to the next sign-up page. 5. Create a [facility] ID. This will be your [facility] login ID and cannot be changed, so think of one that is secure and easy to remember. 6. Create a [facility] password. You can change your password at any time. 7. Enter your Password Reset Question and Answer. This can be used at a later time if you forget your password. 8. Enter your e-mail address. You will receive e-mail notification when new information is available in [address] 9. Click Sign Up. You can now view and download portions of your medical record. 10. Click the Download Summary menu link to download a portable copy of your medical information. If you have questions, please visit the Frequently Asked Questions section of the [facility] [facility] website. Remember, [facility] is NOT to be used for urgent needs. For medical emergencies, dial 911. Now available from your iPhone and Android! Please provide this summary of care documentation to your next provider. Your primary care clinician is listed as Cornelia Weathers. If you have any questions after today's visit, please call [phone].

## 2017-05-22 ENCOUNTER — TELEPHONE (OUTPATIENT)
Dept: FAMILY MEDICINE CLINIC | Age: 67
End: 2017-05-22

## 2017-05-24 ENCOUNTER — HOSPITAL ENCOUNTER (OUTPATIENT)
Dept: VASCULAR SURGERY | Age: 67
Discharge: HOME | End: 2017-05-24
Attending: CLINIC/CENTER
Payer: MEDICARE

## 2017-05-24 DIAGNOSIS — R60.0 EDEMA OF RIGHT LOWER EXTREMITY: ICD-10-CM

## 2017-05-24 PROCEDURE — 93971 EXTREMITY STUDY: CPT

## 2017-05-24 NOTE — PROCEDURES
[employer]  *** FINAL REPORT ***    Name: Altagracia Panchal  MRN: [id number]    Outpatient  : 16 May 1950  HIS Order #: [id number]  [id number] [facility] Visit #: [id number]  Date: 24 May 2017    TYPE OF TEST: Peripheral Venous Testing    REASON FOR TEST  Edema    Right Leg:-  Deep venous thrombosis:           No  Superficial venous thrombosis:    No  Deep venous insufficiency:        Not examined  Superficial venous insufficiency: Not examined      INTERPRETATION/FINDINGS  Duplex images were obtained using 2-D gray scale, color flow, and  spectral Doppler analysis. Right leg :  1. Deep vein(s) visualized include the common femoral, deep femoral,  proximal femoral, mid femoral, distal femoral, popliteal(above knee),  popliteal(fossa), popliteal(below knee), posterior tibial and peroneal   veins. 2. No evidence of deep venous thrombosis detected in the veins  visualized. 3. No evidence of deep vein thrombosis in the contralateral common  femoral vein. 4. No evidence of superficial thrombosis detected. ADDITIONAL COMMENTS    I have personally reviewed the data relevant to the interpretation of  this  study. TECHNOLOGIST: Warden Vince RVT  Signed: 2017 03:23 PM    PHYSICIAN: Candelario Watters.  Yoli Kohler MD  Signed: 2017 11:45 PM

## 2017-05-25 ENCOUNTER — HOSPITAL ENCOUNTER (OUTPATIENT)
Dept: VASCULAR SURGERY | Age: 67
Discharge: HOME | End: 2017-05-25
Attending: CLINIC/CENTER
Payer: MEDICARE

## 2017-05-25 DIAGNOSIS — R09.89 DECREASED DORSALIS PEDIS PULSE: ICD-10-CM

## 2017-05-25 PROCEDURE — 93922 UPR/L XTREMITY ART 2 LEVELS: CPT

## 2017-05-25 NOTE — PROCEDURES
[facility]  *** FINAL REPORT ***    Name: Velia Tello  MRN: [id number]    Outpatient  : 16 May 1950  HIS Order #: [id number]  [id number] [facility] Visit #: [id number]  Date: 25 May 2017    TYPE OF TEST: Peripheral Arterial Testing    REASON FOR TEST  pulse weakness    Right Leg  Doppler:    Abnormal  Ankle/Brachial: 1.24    Left Leg  Doppler:    Normal  Ankle/Brachial: 1.04    INTERPRETATION/FINDINGS  Physiologic testing was performed using continuous wave Doppler and  segmental pressures. 1. Isolated dorsalis pedis artery disease in the right leg with no  evidence of significant arterial disease in the remaining arteries. 2. No evidence of significant peripheral arterial disease at rest in  the left leg. 3. The right ankle/brachial index is 1.24 and the left ankle/brachial  index is 1.04.  4. The right digital/brachial index is (normal) . 65. and the left  digital/brachial index is (abnormal) . 54. ADDITIONAL COMMENTS    I have personally reviewed the data relevant to the interpretation of  this  study. TECHNOLOGIST: Magaly Florez. KODI Hernandez  Signed: 2017 04:10 PM    PHYSICIAN: Brigette Corrales.  Cristhian Blanca MD  Signed: 2017 10:40 PM

## 2017-06-02 DIAGNOSIS — R09.89 DECREASED DORSALIS PEDIS PULSE: Primary | ICD-10-CM

## 2017-06-02 DIAGNOSIS — R68.89 ABNORMAL ANKLE BRACHIAL INDEX (ABI): ICD-10-CM

## 2017-06-05 ENCOUNTER — TELEPHONE (OUTPATIENT)
Dept: FAMILY MEDICINE CLINIC | Age: 67
End: 2017-06-05

## 2017-06-05 NOTE — TELEPHONE ENCOUNTER
I called Mr Juancho Mcallister to let him know his Vascular Referral was complete, and he asked if Dr Mey Kauffman could complete a letter to excuse him from [place] [facility] for the month of July due to multiple doctors appointments. Please advise.     [facility]

## 2017-06-08 NOTE — TELEPHONE ENCOUNTER
Called patient to obtain further information about his [address] that he is asking for. Had to leave a message.     Karyle Gip

## 2017-06-12 NOTE — TELEPHONE ENCOUNTER
Requested Prescriptions     Pending Prescriptions Disp Refills    metoprolol tartrate (LOPRESSOR) 50 mg tablet 60 Tab 2

## 2017-06-13 RX ORDER — METOPROLOL TARTRATE 50 MG/1
TABLET ORAL
Qty: 60 TAB | Refills: 2 | Status: SHIPPED | OUTPATIENT
Start: 2017-06-13 | End: 2017-11-15 | Stop reason: SDUPTHER

## 2017-07-17 ENCOUNTER — OFFICE VISIT (OUTPATIENT)
Dept: FAMILY MEDICINE CLINIC | Age: 67
End: 2017-07-17

## 2017-07-17 VITALS
HEIGHT: 69 IN | WEIGHT: 244 LBS | BODY MASS INDEX: 36.14 KG/M2 | DIASTOLIC BLOOD PRESSURE: 60 MMHG | HEART RATE: 52 BPM | SYSTOLIC BLOOD PRESSURE: 118 MMHG | TEMPERATURE: 97.2 F | RESPIRATION RATE: 17 BRPM

## 2017-07-17 DIAGNOSIS — R68.89 ABNORMAL ANKLE BRACHIAL INDEX (ABI): ICD-10-CM

## 2017-07-17 DIAGNOSIS — D64.9 NORMOCYTIC NORMOCHROMIC ANEMIA: ICD-10-CM

## 2017-07-17 DIAGNOSIS — R35.1 NOCTURIA: ICD-10-CM

## 2017-07-17 DIAGNOSIS — E78.2 MIXED HYPERLIPIDEMIA: ICD-10-CM

## 2017-07-17 DIAGNOSIS — R63.4 WEIGHT LOSS: ICD-10-CM

## 2017-07-17 DIAGNOSIS — I10 ESSENTIAL HYPERTENSION: ICD-10-CM

## 2017-07-17 DIAGNOSIS — R00.1 BRADYCARDIA: ICD-10-CM

## 2017-07-17 DIAGNOSIS — I48.0 PAROXYSMAL ATRIAL FIBRILLATION (HCC): ICD-10-CM

## 2017-07-17 NOTE — PROGRESS NOTES
Darcy Burkett is a 79 y.o. male and presents with Hypertension (*Follow up)       Subjective:    1. Lack of Appetite and Weight Loss:  - Weight has stabilized. Patient was 233lbs in 1/2017 and 232lbs today. He is now exercising and walking at least 4x/week. Appetite is much better, but diet is limited by coumadin restrictions. The bad taste in his mouth has resolved. - Patient was referred to Heme/Onc (Dr. Kalee Vargas) for his anemia and abnormal SPEP/UPEP. Diagnosed with Autoimmune Hemolytic Anemia (JAYMIE positive). Patient had infusions of IVIG, CT chest/abdomen/pelvis, bone marrow biopsy. Per Dr. Claudia Wang note - AIHA responded to the IVIG and resolved. Regarding the normocytic anemia, Dr. Lisa Doe said he could not find out the cause of the anemia despite an extensive evaluation and plans to follow Mr. Paloma Phillip every four months. Patient says he will go back to Dr. Lisa Doe in June or July. - Patient was referred to GI to evaluate weight loss and pancreatic pseudocyts. Patient declined EUS with FNA for pancreatic cyst due to risk complication. He was going to have a KUB for his complaint of chronic constipation. Apparently, an EGD was scheduled on 12/14/16 at [place] [address], but patient canceled after he was told that there is a risk of pancreatitis with EGDs. - CT Chest/Abdomen/Pelvis done on 10/19/16. and then repeated by Heme/onc in 12/2016.  - Renal US done for renal cysts seen on CT - Bilateral simple appearing renal cysts  - PSA 0.5. TFTs wnl. ALEXANDRA done and stool was heme (-). Colonoscopy done in 1/2015 = normal CRC screening colonoscopy      2. Atrial Fibrillation  - Followed by Dr. Araceli Pollard and saw him again last week. On Metoprolol BID and coumadin. Patient really wants to come off of the coumadin so Dr. Vicky Torrez is going to have him wear a Holter Monitor for about a week so he can see if the Afib is still present. If the arrhythmia has resolved, he can stop the coumadin. INR checks per coumadin clinic. Current dose of coumadin is 3 mg on Mondays and Fridays and 2mg on all the other days.       3. Diabetes  - Last A1c 9.3. Followed by Endocrine, Dr. Shayan Zimmerman. Current insulin regimen - 28 units of Levemir at night and Novolog with meals (10 units at breakfast, 12 units at lunch and 14 units at dinner). He is checking his bg often and is concerned because there have been a couple of times when his blood sugar was in the 90s before bed. He is not sure what to do with his Levemir if that happens again. I suggested he have something to eat and give himself half of his normal Levemir dose if his bg is on the lower side at night. I also wonder if his evening Novolog dose may be too high. I suggested he take 10 units of Novolog at dinner (like he does with breakfast) and see if that helps. I also told him to call Dr. Golden Watkins today and let him know that he has had bg in the 90s at night on a couple of occasions and see what he recommends. He also needs to discuss with Dr. Rivera Keep possibly reducing his evening Novolog dose. Patient verbalized understanding and agreed to call today. Next appointment with Endocrine is on 5/31/17. Last Microalbumin/Cr ratio 814. Patient is not on ACE-I; vasotec was stopped in the hospital and thought to have possibly caused his pancreatitis. Dr. Supa Brown in 12/2016 - dx with mild background retinopathy and will follow up in 1 year. Patient saw his Podaitrist, Dr. Stephen Colon in 2016 - asked him to bring in a copy of last office note.       4. Nocturia  - He saw Dr. Katja Prajapati and did a voiding diary for three days. Patient advised to limit fluids into PM and elevate legs in afternoon. Dr. Miriam Gardner note said he would consider an anticholinergic qhs if nocturia is persistent and more bothersome. Today, patient reports that his symptoms are better, but he still wants to see a different Urologist. He plans to call me back with the name of a doctor that was recommended to him.       5.  Anemia  - Patient never had anemia prior to being hospitalized. Peripheral smear = normocytic, normochromic anemia with adequate platelets. Iron/ferritin studies looked like ACD (perhaps from DM?); Stool heme (-) and last colonoscopy was normal in 2015; B12 1239; He was taking folic acid but stopped. Patient evaluated by Heme/Onc (Dr. Gm Chung) as noted above. AIHA resolved. Extensive work up done including blood work, imaging, and bone marrow biopsy. Still no definitive explanation for the anemia. Patient will be monitored every 4 months.       6. HTN  - /62. On Metoprolol 50 mg BID and Norvasc 5 mg daily. No dizziness or lightheadedness.        7. Hx of Renal Failure  - Resolved. BUN/Cr 20/0. 91. He will go back to see Dr. Carolina Chan in June 2017.       8. Hx of Sebaceous Cyst on Back  - Resolved. Underwent an outpatient excision of a chronically infected ruptured epidermal inclusion cyst. Surgical site well healed.       9. Hyperlipidemia -  Patient has refused a statin in the past due to his concern over potential side effects. Last LDL 85.8 and HDL 40.      10. Vitamin D Deficiency - Last level good at 40.8. Patient is on a daily supplement.      11. YASEMIN - Patient going back to the sleep clinic to see if he still needs the CPAP since he has lost weight.       Health Maintenance:  PSA - 0.5 on 10/3/16  Colonoscopy - Done 1/2015. Normal  Eye Exam - Done with Dr. Page Leyva in 12/2016           ROS:  Pt denies: Wt loss, Fever/Chills, HA, Visual changes, Fatigue, Chest pain, SOB, LOCKETT, Abd pain, N/V/D/C, Blood in stool or urine, Edema. Pertinent positive as above in HPI. All others negative. The problem list was updated as a part of today's visit.   Patient Active Problem List   Diagnosis Code    Hypertension I10    Hyperlipidemia E78.5    Vitamin D deficiency E55.9    Sleep apnea G47.30    Tobacco abuse Z72.0    Stasis dermatitis I87.2    Internal hemorrhoid K64.8    Microalbuminuria R80.9    Insulin dependent type 2 diabetes mellitus, uncontrolled ([facility]) E11.65, Z79.4    Testicular pain N50.819    DDD (degenerative disc disease), lumbar M51.36    Paroxysmal atrial fibrillation (HCC) I48.0    Acute diastolic CHF (congestive heart failure) ([facility]) I50.31    Nocturia R35.1    Anemia D64.9       The PSH,  were reviewed. SH:  Social History   Substance Use Topics    Smoking status: Former Smoker     Packs/day: 1.00     Years: 42.00     Types: Cigarettes     Quit date: 9/22/2016    Smokeless tobacco: Never Used    Alcohol use Yes      Comment: occasional         Medications/Allergies:  Current Outpatient Prescriptions on File Prior to Visit   Medication Sig Dispense Refill    losartan (COZAAR) 50 mg tablet       tamsulosin (FLOMAX) 0.4 mg capsule Take 1 Cap by mouth daily (after dinner). 90 Cap 3    metoprolol tartrate (LOPRESSOR) 50 mg tablet TAKE ONE TABLET BY MOUTH TWICE DAILY 60 Tab 2    insulin aspart (NOVOLOG FLEXPEN) 100 unit/mL inpn by SubCUTAneous route. 10 units subcutaneously 15 minutes before breakfast and 12 units 15 minutes before lunch and 14 units 15 minutes before dinner.  ZINC PO Take  by mouth. Take 1 tablet by mouth daily.  insulin detemir (LEVEMIR FLEXTOUCH) 100 unit/mL (3 mL) inpn Inject 30 units subcutaneously in the evening (Patient taking differently: Inject 28 units subcutaneously in the evening) 15 mL 5    warfarin (COUMADIN) 3 mg tablet Take 1 Tab by mouth daily. (Patient taking differently: Take 3 mg by mouth daily. 3 mg on Monday and Friday. 2 mg on other days of the week) 10 Tab 0    therapeutic multivitamin (THERA) tablet Take 1 Tab by mouth daily.       Insulin Needles, Disposable, (NOVOFINE 32) 32 gauge x 1/4\" ndle USE AS DIRECTED WITH LEVEMIR DAILY 100 Pen Needle 11    Lancets misc Dispense Accu Check Multi-Clix Drum - use to check blood sugar bid or as directed - 90 day supply 100 Each 3    glucose blood VI test strips (BLOOD GLUCOSE TEST) strip Test strips of patient choice - use to check blood sugar bid or as directed 200 Strip 3    Blood-Glucose Meter (ACCU-CHEK BRY) misc by Does Not Apply route.  glucose blood VI test strips (ACCU-CHEK SMARTVIEW) strip by Does Not Apply route See Admin Instructions.  Cholecalciferol, Vitamin D3, (VITAMIN D3) 2,000 unit cap daily. No current facility-administered medications on file prior to visit. Allergies   Allergen Reactions    Iodinated Contrast- Oral And Iv Dye Other (comments)    Iodine Other (comments)    Vasotec [Enalapril Maleate] Other (comments)     Thought to have possibly caused his pancreatitis       Objective:  Visit Vitals    /60    Pulse (!) 52  Comment: manual pulse rate    Temp 97.2 °F (36.2 °C) (Oral)    Resp 17    Ht 5' 9\" (1.753 m)    Wt 244 lb (110.7 kg)    BMI 36.03 kg/m2    Body mass index is 36.03 kg/(m^2). Exam:   Appearance: alert, well appearing, in no respiratory distress and well hydrated. HEENT:  NC/AT. Exterior ears and tympanic membranes normal bilaterally. Conjunctiva normal. PERRL. EOMI  Oropharynx clear and moist mucous membranes. Neck: Supple.  No cervical lymphadenopathy, no JVD, no thyromegaly  Heart:  RRR without M/R/G  Lungs:  CTAB, no rhonchi, rales, or wheezes with good air exchange  Abdomen: Soft, normoactive BS, non-tender, non-distended, no organomegaly, no palpable mass   Ext:  No C/C/E   Skin: No rash   Neuro: AAO x 3, no focal deficits    Labwork and Ancillary Studies:    CBC w/Diff  Lab Results   Component Value Date/Time    WBC 11.1 10/25/2016 12:06 PM    HGB 8.0 10/25/2016 12:06 PM    PLATELET 825 70/52/2111 12:06 PM         Basic Metabolic Profile  Lab Results   Component Value Date/Time    Sodium 138 03/27/2017 08:43 AM    Potassium 4.0 03/27/2017 08:43 AM    Chloride 103 03/27/2017 08:43 AM    CO2 25 03/27/2017 08:43 AM    Anion gap 10 03/27/2017 08:43 AM    Glucose 138 03/27/2017 08:43 AM    BUN 20 03/27/2017 08:43 AM Creatinine 0.91 03/27/2017 08:43 AM    BUN/Creatinine ratio 22 03/27/2017 08:43 AM    GFR est AA >60 03/27/2017 08:43 AM    GFR est non-AA >60 03/27/2017 08:43 AM    Calcium 8.4 03/27/2017 08:43 AM        Cholesterol  Lab Results   Component Value Date/Time    Cholesterol, total 139 03/27/2017 08:43 AM    HDL Cholesterol 40 03/27/2017 08:43 AM    LDL, calculated 85.8 03/27/2017 08:43 AM    Triglyceride 66 03/27/2017 08:43 AM    CHOL/HDL Ratio 3.5 03/27/2017 08:43 AM       Assessment/Plan:    {No Diagnosis Found}          I have discussed the diagnosis with the patient and the intended plan as seen in the above orders. The patient has received an After-Visit Summary and questions were answered concerning future plans. Patient verbalized understanding of above instructions. Health Maintenance:   Health Maintenance   Topic Date Due    DTaP/Tdap/Td series (1 - Tdap) 05/16/1971    Pneumococcal 65+ High/Highest Risk (1 of 2 - PCV13) 05/16/2015    FOOT EXAM Q1  10/14/2016    INFLUENZA AGE 9 TO ADULT  08/01/2017    HEMOGLOBIN A1C Q6M  09/27/2017    Prostate-Specific Antigen  10/03/2017    MEDICARE YEARLY EXAM  12/14/2017    EYE EXAM RETINAL OR DILATED Q1  12/15/2017    MICROALBUMIN Q1  12/21/2017    LIPID PANEL Q1  03/27/2018    GLAUCOMA SCREENING Q2Y  12/15/2018    COLONOSCOPY  01/13/2020    Hepatitis C Screening  Completed    ZOSTER VACCINE AGE 60>  Addressed       No orders of the defined types were placed in this encounter.

## 2017-07-17 NOTE — MR AVS SNAPSHOT
Visit Information Date & Time Provider Department Dept. Phone Encounter #  
 7/17/2017 10:00 AM Mack Gtz, [address] [facility] [phone] [id number] Your Appointments 10/10/2017 10:45 AM  
ESTABLISHED PATIENT with Caty Guerrero MD  
[facility] Appt Note: 3 MONTH F/U FLOW RATE & PVR  
 [address] [facility] [address] [place] [address] [address] [phone]  
  
    
 12/18/2017 10:00 AM  
Office Visit with Mack Gtz MD  
[facility] [address]) Appt Note:   
 [address]  
  
   
 [address] [facility] Upcoming Health Maintenance Date Due DTaP/Tdap/Td series (1 - Tdap) 5/16/1971 Pneumococcal 65+ High/Highest Risk (1 of 2 - PCV13) 5/16/2015 FOOT EXAM Q1 10/14/2016 INFLUENZA AGE 9 TO ADULT 8/1/2017 HEMOGLOBIN A1C Q6M 9/27/2017 Prostate-Specific Antigen 10/3/2017 MEDICARE YEARLY EXAM 12/14/2017 EYE EXAM RETINAL OR DILATED Q1 12/15/2017 MICROALBUMIN Q1 12/21/2017 LIPID PANEL Q1 3/27/2018 GLAUCOMA SCREENING Q2Y 12/15/2018 COLONOSCOPY 1/13/2020 Allergies as of 7/17/2017  Review Complete On: 7/17/2017 By: Margret Holm LPN Severity Noted Reaction Type Reactions Iodinated Contrast- Oral And Iv Dye  09/13/2016    Other (comments) Iodine  07/27/2015    Other (comments) Vasotec [Enalapril Maleate]  12/16/2016    Other (comments) Thought to have possibly caused his pancreatitis Current Immunizations  Reviewed on 7/17/2017 Name Date Influenza High Dose Vaccine PF 10/22/2015 Influenza Vaccine 12/1/2014 Zoster Vaccine, Live 1/1/2015 Reviewed by Margret Holm LPN on 9/96/6872 at 67:63 AM  
You Were Diagnosed With   
  
 Codes Comments Bradycardia    -  Primary ICD-10-CM: R00.1 ICD-9-CM: 427.89 Vitals BP Pulse Temp Resp Height(growth percentile) Weight(growth percentile)  
 118/60 (!) 52 97.2 °F (36.2 °C) (Oral) 17 5' 9\" (1.753 m) 244 lb (110.7 kg) BMI Smoking Status 36.03 kg/m2 Former Smoker Vitals History BMI and BSA Data Body Mass Index Body Surface Area 36.03 kg/m 2 2.32 m 2 Preferred Pharmacy Pharmacy Name Phone [address] [phone] Your Updated Medication List  
  
   
This list is accurate as of: 7/17/17 12:18 PM.  Always use your most recent med list.  
  
  
  
  
 Martin Estrada Generic drug:  Blood-Glucose Meter  
by Does Not Apply route. * ACCU-CHEK SMARTVIEW TEST STRIP strip Generic drug:  glucose blood VI test strips  
by Does Not Apply route See Admin Instructions. * glucose blood VI test strips strip Commonly known as:  blood glucose test  
Test strips of patient choice - use to check blood sugar bid or as directed  
  
 insulin detemir 100 unit/mL (3 mL) Inpn Commonly known as:  Farhad Lei Inject 30 units subcutaneously in the evening Insulin Needles (Disposable) 32 gauge x 1/4\" Ndle Commonly known as:  NOVOFINE 32 USE AS DIRECTED WITH LEVEMIR DAILY Lancets Misc Dispense Accu Check Multi-Clix Drum - use to check blood sugar bid or as directed - 90 day supply  
  
 losartan 50 mg tablet Commonly known as:  COZAAR  
  
 metoprolol tartrate 50 mg tablet Commonly known as:  LOPRESSOR  
TAKE ONE TABLET BY MOUTH TWICE DAILY NovoLOG Flexpen 100 unit/mL Inpn Generic drug:  insulin aspart  
by SubCUTAneous route. 10 units subcutaneously 15 minutes before breakfast and 12 units 15 minutes before lunch and 14 units 15 minutes before dinner. tamsulosin 0.4 mg capsule Commonly known as:  FLOMAX Take 1 Cap by mouth daily (after dinner). THERA tablet Generic drug:  therapeutic multivitamin Take 1 Tab by mouth daily. VITAMIN D3 2,000 unit Cap capsule Generic drug:  Cholecalciferol (Vitamin D3)  
daily. warfarin 3 mg tablet Commonly known as:  COUMADIN Take 1 Tab by mouth daily. ZINC PO Take  by mouth. Take 1 tablet by mouth daily. * Notice: This list has 2 medication(s) that are the same as other medications prescribed for you. Read the directions carefully, and ask your doctor or other care provider to review them with you. We Performed the Following AMB POC EKG ROUTINE W/ 12 LEADS, INTER & REP [38853 CPT(R)] Introducing [facility] SERVICES! Selin Cheng introduces [employer] patient portal. Now you can access parts of your medical record, email your doctor's office, and request medication refills online. 1. In your internet browser, go to [email or web address] 2. Click on the First Time User? Click Here link in the Sign In box. You will see the New Member Sign Up page. 3. Enter your [employer] Access Code exactly as it appears below. You will not need to use this code after youve completed the sign-up process. If you do not sign up before the expiration date, you must request a new code. · [employer] Access Code: [email or web address] Expires: 8/7/2017 10:07 AM 
 
4. Enter the last four digits of your Social Security Number (xxxx) and Date of Birth (mm/dd/yyyy) as indicated and click Submit. You will be taken to the next sign-up page. 5. Create a [employer] ID. This will be your [employer] login ID and cannot be changed, so think of one that is secure and easy to remember. 6. Create a [employer] password. You can change your password at any time. 7. Enter your Password Reset Question and Answer. This can be used at a later time if you forget your password. 8. Enter your e-mail address. You will receive e-mail notification when new information is available in [address] 9. Click Sign Up. You can now view and download portions of your medical record. 10. Click the Download Summary menu link to download a portable copy of your medical information. If you have questions, please visit the Frequently Asked Questions section of the [employer] website. Remember, [employer] is NOT to be used for urgent needs. For medical emergencies, dial 911. Now available from your iPhone and Android! Please provide this summary of care documentation to your next provider. Your primary care clinician is listed as Alex Alvarado. If you have any questions after today's visit, please call [phone].

## 2017-07-17 NOTE — PROGRESS NOTES
Marlin Colmenares is a 79 y.o. male and presents with Hypertension (*Follow up)       Subjective:  Mr. Brianna Jimenez is here today for follow up. He says that he has been doing well overall. 1. Lack of Appetite and Weight Loss: Resolved. - Patient no longer losing weight. In fact, his weight is actually going up. He is 244 lbs today and was 232 lbs last time I saw him. He says that his appetite is \"too good now\". Encouraged him to keep exercising and walking at least 4x/week. Follow diabetic diet and monitor calorie intake closely. - Patient was referred to Heme/Onc (Dr. Shankar Mclain) for his anemia and abnormal SPEP/UPEP. See Anemia below. - Stool was heme (-) on AELXANDRA. Colonoscopy in 1/2015 was normal. Patient was referred back to GI to evaluate weight loss and pancreatic pseudocyts. Patient declined EUS with FNA for pancreatic cyst due to risk complication. An EGD was scheduled on 12/14/16 at [facility], but patient canceled after he was told that there is a risk of pancreatitis with EGDs. - CT Chest/Abdomen/Pelvis done 10/19/16 and repeated by Heme/onc in 12/2016.  - Renal US done for renal cysts seen on CT - Bilateral simple appearing renal cysts  - PSA 0.5. TFTs wnl.       2. Atrial Fibrillation:   - Followed by Dr. Diamond Sorenson. Still on Metoprolol and Coumadin. INR checks with coumadin clinic. Patient says that Dr. Diamond Sorenson had him wear a Holter Monitor and results showed that Afib was no longer present, but his meds were not changed and he wants to know why. Will need to request Cardiology office notes to see exactly what was discussed. Also, encouraged patient to follow up with Dr. Diamond Sorenson if he has additional questions. Current dose of coumadin is 6 mg on T/Th and 9 mg all other days.      3. Bradycardia: HR down to 52 on manual check and he is usually in the upper 50s or 60s range. On Metoprolol BID. Patient denies lightheadedness, dizziness or presyncopal symptoms. Will check EKG today in the office.      4. Diabetes:  - Followed by Endocrinology. On Levemir 25 units at night and Novolog 10 units with breakfast, 12 units with lunch and 12 units with dinner. Last ov Mr. Smith Lantigua mentioned having some lower blood sugars at night and I gave him suggestions on how to adjust his insulin. I also asked him to call Dr. Raz Giron that same day and let him know about his lower blood sugar readings and get his recommendations. Unfortunately, Mr. Smith Lantigua says that he did not discuss his concerns with Dr. Raz Giron. However, the nighttime low blood sugars have not been such an issue recently. He was supposed to see Endocrine on 5/31/17 - will need to review records. Microalbumin/Cr ratio 145.8. Patient now on Losartan per Nephrology. Patient was on Vasotec in the past, but this was stopped in the hospital and thought to have possibly caused his pancreatitis. Saw Dr. Kiersten Marquez in 12/2016 - dx with mild background retinopathy and will follow up in 1 year. Patient saw his Podaitrist, Dr. Dominique Simpson in 2016 - asked him to bring in a copy of last office note.       5. Nocturia: Saw Dr. Edwin Gaston on 6/27/17. Started on Flomax 0.4 mg. BP well controlled. Return to Urology in 3 months for PVR and flow rate.       6. Anemia:   - Followed by Heme/Onc, Dr. Dario Carmichael. Diagnosed with Autoimmune Hemolytic Anemia (JAYMIE positive). Patient had infusions of IVIG, CT chest/abdomen/pelvis, bone marrow biopsy. Per Dr. Gallo Baker note - AIHA responded to the IVIG and resolved. Regarding the normocytic anemia, Dr. Caesar Koyanagi said he could not find a definitive explanation for the anemia despite an extensive evaluation and plans to follow Mr. Smith Lantigua every four months. Per Mr. Smith Lantigua, his last H/H was good - 12.5/36.9 in Care Everywhere on 6/16/17. Will review last Heme/Onc office note.      7. HTN: /60. On Metoprolol 50 mg BID and Losartan 50 mg daily. Patient reports that Losartan was added per Nephro 2/2 microalbuminuria and Norvasc was stopped.  Denies dizziness or lightheadedness.        8. Hx of Renal Failure: Resolved. Followed by Dr. Veronica Quiroz. Recent labs - BUN/Cr 27/0.8. On Losartan for microalbuminuria.      9. Hyperlipidemia: Not on meds. Patient has refused a statin in the past due to his concern over potential side effects. Last LDL 85.8 and HDL 40. Need to update FLP in 9/2017. 10. Abnormal CHRISTIANO: See results below. Patient has been referred to Vascular Surgery - appointment coming up soon. CHRISTIANO 5/25/17  1. 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 2. No evidence of significant peripheral arterial disease at rest in the left leg. 3. The right ankle/brachial index is 1.24 and the left ankle/brachial index is 1.04.  4. The right digital/brachial index is (normal) . 65. and the left digital/brachial index is (abnormal) . 54. Health Maintenance:  PSA - 0.5 on 10/3/16  Colonoscopy - Done 1/2015. Normal  Eye Exam - Done with Dr. Jag Pfeiffer in 12/2016        ROS:  Pt denies: Wt loss, Fever/Chills, HA, Visual changes, Fatigue, Chest pain, SOB, LOCKETT, Abd pain, N/V/D/C, Blood in stool or urine, Edema. Pertinent positive as above in HPI. All others negative. The problem list was updated as a part of today's visit. Patient Active Problem List   Diagnosis Code    Hypertension I10    Hyperlipidemia E78.5    Vitamin D deficiency E55.9    Sleep apnea G47.30    Tobacco abuse Z72.0    Stasis dermatitis I87.2    Internal hemorrhoid K64.8    Microalbuminuria R80.9    Insulin dependent type 2 diabetes mellitus, uncontrolled (HCC) E11.65, Z79.4    Testicular pain N50.819    DDD (degenerative disc disease), lumbar M51.36    Paroxysmal atrial fibrillation (HCC) I48.0    Acute diastolic CHF (congestive heart failure) (HCC) I50.31    Nocturia R35.1    Anemia D64.9       The PSH, FH were reviewed.       SH:  Social History   Substance Use Topics    Smoking status: Former Smoker Packs/day: 1.00     Years: 42.00     Types: Cigarettes     Quit date: 9/22/2016    Smokeless tobacco: Never Used    Alcohol use Yes      Comment: occasional         Medications/Allergies:  Current Outpatient Prescriptions on File Prior to Visit   Medication Sig Dispense Refill    losartan (COZAAR) 50 mg tablet       tamsulosin (FLOMAX) 0.4 mg capsule Take 1 Cap by mouth daily (after dinner). 90 Cap 3    metoprolol tartrate (LOPRESSOR) 50 mg tablet TAKE ONE TABLET BY MOUTH TWICE DAILY 60 Tab 2    insulin aspart (NOVOLOG FLEXPEN) 100 unit/mL inpn by SubCUTAneous route. 10 units subcutaneously 15 minutes before breakfast and 12 units 15 minutes before lunch and 14 units 15 minutes before dinner.  ZINC PO Take  by mouth. Take 1 tablet by mouth daily.  insulin detemir (LEVEMIR FLEXTOUCH) 100 unit/mL (3 mL) inpn Inject 30 units subcutaneously in the evening (Patient taking differently: Inject 28 units subcutaneously in the evening) 15 mL 5    warfarin (COUMADIN) 3 mg tablet Take 1 Tab by mouth daily. (Patient taking differently: Take 3 mg by mouth daily. 3 mg on Monday and Friday. 2 mg on other days of the week) 10 Tab 0    therapeutic multivitamin (THERA) tablet Take 1 Tab by mouth daily.  Insulin Needles, Disposable, (NOVOFINE 32) 32 gauge x 1/4\" ndle USE AS DIRECTED WITH LEVEMIR DAILY 100 Pen Needle 11    Lancets misc Dispense Accu Check Multi-Clix Drum - use to check blood sugar bid or as directed - 90 day supply 100 Each 3    glucose blood VI test strips (BLOOD GLUCOSE TEST) strip Test strips of patient choice - use to check blood sugar bid or as directed 200 Strip 3    Blood-Glucose Meter (ACCU-CHEK BRY) misc by Does Not Apply route.  glucose blood VI test strips (ACCU-CHEK SMARTVIEW) strip by Does Not Apply route See Admin Instructions.  Cholecalciferol, Vitamin D3, (VITAMIN D3) 2,000 unit cap daily. No current facility-administered medications on file prior to visit. Allergies   Allergen Reactions    Iodinated Contrast- Oral And Iv Dye Other (comments)    Iodine Other (comments)    Vasotec [Enalapril Maleate] Other (comments)     Thought to have possibly caused his pancreatitis       Objective:  Visit Vitals    /60    Pulse (!) 52  Comment: manual pulse rate    Temp 97.2 °F (36.2 °C) (Oral)    Resp 17    Ht 5' 9\" (1.753 m)    Wt 244 lb (110.7 kg)    BMI 36.03 kg/m2    Body mass index is 36.03 kg/(m^2). Appearance: Alert and oriented. No acute distress.    HEENT: NC/AT. Conjunctiva normal. Anicteric sclerae. PERRL, EOMI. TMs clear. Oropharynx clear and moist.    Neck: Neck supple. No cervical LAD - questionable LAD from last time not appreciated. Heart: (+) Bradycardia. Rhythm regular today without M/R/G. Intact distal pulses. Lungs: CTAB, no rhonchi, rales, crackles or wheezes with good air exchange  Abdomen: Obese, soft, normoactive BS, non tender, non distended, no palpable masses.    Back: Well healed linear surgical scar noted on mid-upper back 2/2 excision of epidermal inclusion cyst.   Ext: No cyanosis. LEs with shiny, hyperpigmented skin. Feet warm and well perfused. Dp palpable in left foot, but diminished in the right foot. RLE somewhat enlarged when compared to the left - right PVL normal.  Neuro: No focal deficits. Speech normal  Psych: Mood good today. Short-term memory intact.   Labwork and Ancillary Studies:    CBC w/Diff  Lab Results   Component Value Date/Time    WBC 11.1 10/25/2016 12:06 PM    HGB 8.0 10/25/2016 12:06 PM    PLATELET 301 27/50/4468 12:06 PM         Basic Metabolic Profile  Lab Results   Component Value Date/Time    Sodium 138 03/27/2017 08:43 AM    Potassium 4.0 03/27/2017 08:43 AM    Chloride 103 03/27/2017 08:43 AM    CO2 25 03/27/2017 08:43 AM    Anion gap 10 03/27/2017 08:43 AM    Glucose 138 03/27/2017 08:43 AM    BUN 20 03/27/2017 08:43 AM    Creatinine 0.91 03/27/2017 08:43 AM    BUN/Creatinine ratio 22 03/27/2017 08:43 AM    GFR est AA >60 03/27/2017 08:43 AM    GFR est non-AA >60 03/27/2017 08:43 AM    Calcium 8.4 03/27/2017 08:43 AM        Cholesterol  Lab Results   Component Value Date/Time    Cholesterol, total 139 03/27/2017 08:43 AM    HDL Cholesterol 40 03/27/2017 08:43 AM    LDL, calculated 85.8 03/27/2017 08:43 AM    Triglyceride 66 03/27/2017 08:43 AM    CHOL/HDL Ratio 3.5 03/27/2017 08:43 AM       Assessment/Plan:      ICD-10-CM ICD-9-CM    1. Weight loss and Decreased appetite R63.4 783.21 Resolved. Patient's weight is actually going up and appetite is great. Discussed how he does not want to regain too much weight since he has multiple chronic medical problems such as HTN, diabetes and still qualifies as obese. Continue to follow a diabetic diet, limit carbs and fatty/processed foods. Still to lean meats and fresh fruits/vegetable. Exercise as able   2. Insulin dependent type 2 diabetes mellitus, uncontrolled ([address] E11.65 250.02 Followed by Endocrinology  Low nighttime blood sugars not really an issue anymore. Will review recent office notes. Z79.4 V58.67    3. Paroxysmal atrial fibrillation (HCC) I48.0 427.31 Followed by Dr. Tidwell. On BB and coumadin. Patient reluctant to take Xarelto. Need to review records to see what Holter Monitor showed and Cardiology reccs. 4. Bradycardia R00.1 427.89 AMB POC EKG ROUTINE W/ 12 LEADS, INTER & REP - HR 52 on manual check and then down to 46 on EKG. EKG reviewed - marked sinus bradycardia. Patient denies any symptoms and BP is okay, but will still reduce Lopressor dose by half and discuss situation with Dr. Tidwell.   5. Essential hypertension I10 401.9 Bp well controlled. Norvasc stopped. Now on Lopressor and Losartan. 2 week bp check on decreased Lopressor dose. 6. Nocturia R35.1 788.43 Followed by Urology. Now on Flomax. 7. Normocytic normochromic anemia D64.9 285.9 Followed by Dr. Parviz Rudolph. H/H improved. Will review notes.    8. Abnormal ankle brachial index (CHRISTIANO) R68.89 796.4 Patient has upcoming appt with Vascular. 9. Mixed hyperlipidemia E78.2 272.2 Patient not on meds. LDL < 100 on last labs. Repeat FLP in 9/2017  LIPID PANEL       Follow-up Disposition:  Return in about 2 weeks (around 7/31/2017) for BP check since meds have been adjusted. I have discussed the diagnosis with the patient and the intended plan as seen in the above orders. The patient has received an After-Visit Summary and questions were answered concerning future plans. Patient verbalized understanding of above instructions. Health Maintenance:   Health Maintenance   Topic Date Due    DTaP/Tdap/Td series (1 - Tdap) 05/16/1971    Pneumococcal 65+ High/Highest Risk (1 of 2 - PCV13) 05/16/2015    FOOT EXAM Q1  10/14/2016    INFLUENZA AGE 9 TO ADULT  08/01/2017    HEMOGLOBIN A1C Q6M  09/27/2017    Prostate-Specific Antigen  10/03/2017    MEDICARE YEARLY EXAM  12/14/2017    EYE EXAM RETINAL OR DILATED Q1  12/15/2017    MICROALBUMIN Q1  12/21/2017    LIPID PANEL Q1  03/27/2018    GLAUCOMA SCREENING Q2Y  12/15/2018    COLONOSCOPY  01/13/2020    Hepatitis C Screening  Completed    ZOSTER VACCINE AGE 60>  Addressed       No orders of the defined types were placed in this encounter.

## 2017-07-17 NOTE — PROGRESS NOTES
1. Have you been to the ER, urgent care clinic since your last visit? Hospitalized since your last visit? No    2. Have you seen or consulted any other health care providers outside of the [address] [place] [address] since your last visit? Include any pap smears or colon screening. Yes Where: [facility] Reason for visit: Follow up Patient reports his Kidney doctor stopped his amlodipine.

## 2017-07-18 ENCOUNTER — TELEPHONE (OUTPATIENT)
Dept: FAMILY MEDICINE CLINIC | Age: 67
End: 2017-07-18

## 2017-07-18 NOTE — TELEPHONE ENCOUNTER
Patient called and stated when he was taking Amlodipine and Metoprolol there was no issue with low heart rate but when amlodipine was discontinued and Losartan  started that is when he started having issues with a low rate. Is wanting to know if he should contact Dr. Ji Wolff about this. Also wanted to inform you that he does recall getting dizzy when walking from time to time.  Patient contact number is [phone]

## 2017-09-11 ENCOUNTER — OFFICE VISIT (OUTPATIENT)
Dept: FAMILY MEDICINE CLINIC | Age: 67
End: 2017-09-11

## 2017-09-11 VITALS
BODY MASS INDEX: 36.79 KG/M2 | TEMPERATURE: 96.5 F | HEART RATE: 56 BPM | DIASTOLIC BLOOD PRESSURE: 57 MMHG | HEIGHT: 69 IN | WEIGHT: 248.4 LBS | RESPIRATION RATE: 16 BRPM | SYSTOLIC BLOOD PRESSURE: 121 MMHG

## 2017-09-11 DIAGNOSIS — E66.9 OBESITY (BMI 30-39.9): ICD-10-CM

## 2017-09-11 DIAGNOSIS — M25.531 RIGHT WRIST PAIN: Primary | ICD-10-CM

## 2017-09-11 DIAGNOSIS — I10 ESSENTIAL HYPERTENSION: ICD-10-CM

## 2017-09-11 NOTE — MR AVS SNAPSHOT
Visit Information Date & Time Provider Department Dept. Phone Encounter #  
 9/11/2017  9:30 AM Matt Trujillo, [address] [facility] [phone] [id number] Follow-up Instructions Return for as scheduled. .  
  
Your Appointments 10/10/2017 10:45 AM  
ESTABLISHED PATIENT with Joan Ibarra MD  
[facility]) Appt Note: 3 MONTH F/U FLOW RATE & PVR  
 [address] [address] [phone]  
  
    
 10/27/2017  8:30 AM  
Follow Up with Matt Trujillo MD  
[address]) Appt Note: f/u 30 min; r/s drAngel out Dr. Jose R Rawls pt. letter mailed [facility] [address]  
  
   
 [address]  
  
    
 12/18/2017 10:00 AM  
Office Visit with Ludwig Guillaume MD  
[address]) Appt Note:   
 [address] [facility] [address]  
  
   
 [address] Upcoming Health Maintenance Date Due DTaP/Tdap/Td series (1 - Tdap) 5/16/1971 Pneumococcal 65+ High/Highest Risk (1 of 2 - PCV13) 5/16/2015 FOOT EXAM Q1 10/14/2016 INFLUENZA AGE 9 TO ADULT 8/1/2017 HEMOGLOBIN A1C Q6M 9/27/2017 Prostate-Specific Antigen 10/3/2017 MEDICARE YEARLY EXAM 12/14/2017 EYE EXAM RETINAL OR DILATED Q1 12/15/2017 MICROALBUMIN Q1 12/21/2017 LIPID PANEL Q1 3/27/2018 GLAUCOMA SCREENING Q2Y 12/15/2018 COLONOSCOPY 1/13/2020 Allergies as of 9/11/2017  Review Complete On: 9/11/2017 By: Matt Trujillo MD  
  
 Severity Noted Reaction Type Reactions Iodinated Contrast- Oral And Iv Dye  09/13/2016    Other (comments) Iodine  07/27/2015    Other (comments) Vasotec [Enalapril Maleate]  12/16/2016    Other (comments) Thought to have possibly caused his pancreatitis Current Immunizations  Reviewed on 7/17/2017 Name Date Influenza High Dose Vaccine PF 10/22/2015 Influenza Vaccine 12/1/2014 Zoster Vaccine, Live 1/1/2015 Not reviewed this visit You Were Diagnosed With   
  
 Codes Comments Right wrist pain    -  Primary ICD-10-CM: M25.531 ICD-9-CM: 719.43 Vitals BP Pulse Temp Resp Height(growth percentile) Weight(growth percentile) 121/57 (!) 56 96.5 °F (35.8 °C) (Oral) 16 5' 9\" (1.753 m) 248 lb 6.4 oz (112.7 kg) BMI Smoking Status 36.68 kg/m2 Former Smoker BMI and BSA Data Body Mass Index Body Surface Area  
 36.68 kg/m 2 2.34 m 2 Preferred Pharmacy Pharmacy Name Phone [facility] [address] [phone] Your Updated Medication List  
  
   
This list is accurate as of: 9/11/17  9:45 AM.  Always use your most recent med list.  
  
  
  
  
 [facility] Generic drug:  Blood-Glucose Meter  
by Does Not Apply route. * ACCU-CHEK SMARTVIEW TEST STRIP strip Generic drug:  glucose blood VI test strips  
by Does Not Apply route See Admin Instructions. * glucose blood VI test strips strip Commonly known as:  blood glucose test  
Test strips of patient choice - use to check blood sugar bid or as directed  
  
 insulin detemir 100 unit/mL (3 mL) Inpn Commonly known as:  Nick Lopeser Inject 30 units subcutaneously in the evening Insulin Needles (Disposable) 32 gauge x 1/4\" Ndle Commonly known as:  NOVOFINE 32 USE AS DIRECTED WITH LEVEMIR DAILY Lancets [facility] Dispense Accu Check Multi-Clix Drum - use to check blood sugar bid or as directed - 90 day supply  
  
 losartan 50 mg tablet Commonly known as:  COZAAR Take 100 mg by mouth daily. metoprolol tartrate 50 mg tablet Commonly known as:  LOPRESSOR  
 TAKE ONE TABLET BY MOUTH TWICE DAILY NovoLOG Flexpen 100 unit/mL Inpn Generic drug:  insulin aspart  
by SubCUTAneous route. 10 units subcutaneously 15 minutes before breakfast and 12 units 15 minutes before lunch and 12 units 15 minutes before dinner. tamsulosin 0.4 mg capsule Commonly known as:  FLOMAX Take 1 Cap by mouth daily (after dinner). THERA tablet Generic drug:  therapeutic multivitamin Take 1 Tab by mouth daily. VITAMIN D3 2,000 unit Cap capsule Generic drug:  Cholecalciferol (Vitamin D3)  
daily. warfarin 3 mg tablet Commonly known as:  COUMADIN Take 1 Tab by mouth daily. ZINC PO Take  by mouth. Take 1 tablet by mouth daily. * Notice: This list has 2 medication(s) that are the same as other medications prescribed for you. Read the directions carefully, and ask your doctor or other care provider to review them with you. We Performed the Following REFERRAL TO HAND SURGERY [REF31 Custom] Comments:  
 Please evaluate patient for pain in right wrist at snuff box area. Dr. Sim Led. Follow-up Instructions Return for as scheduled. .  
  
  
Referral Information Referral ID Referred By Referred To  
  
 [id number] ERYN Beebe Not Available Visits Status Start Date End Date 1 New Request 9/11/17 9/11/18 If your referral has a status of pending review or denied, additional information will be sent to support the outcome of this decision. Introducing [facility]! Marvin Beard introduces [employer] patient portal. Now you can access parts of your medical record, email your doctor's office, and request medication refills online. 1. In your internet browser, go to [email or web address] 2. Click on the First Time User? Click Here link in the Sign In box. You will see the New Member Sign Up page. 3. Enter your [employer] Access Code exactly as it appears below.  You will not need to use this code after youve completed the sign-up process. If you do not sign up before the expiration date, you must request a new code. · [employer] Access Code: [email or web address] Expires: 12/10/2017  9:45 AM 
 
4. Enter the last four digits of your Social Security Number (xxxx) and Date of Birth (mm/dd/yyyy) as indicated and click Submit. You will be taken to the next sign-up page. 5. Create a [employer] ID. This will be your [employer] login ID and cannot be changed, so think of one that is secure and easy to remember. 6. Create a [employer] password. You can change your password at any time. 7. Enter your Password Reset Question and Answer. This can be used at a later time if you forget your password. 8. Enter your e-mail address. You will receive e-mail notification when new information is available in [address]. 9. Click Sign Up. You can now view and download portions of your medical record. 10. Click the Download Summary menu link to download a portable copy of your medical information. If you have questions, please visit the Frequently Asked Questions section of the [employer] website. Remember, [employer] is NOT to be used for urgent needs. For medical emergencies, dial 911. Now available from your iPhone and Android! Please provide this summary of care documentation to your next provider. Your primary care clinician is listed as Toya Jose. If you have any questions after today's visit, please call [phone].

## 2017-09-11 NOTE — PROGRESS NOTES
1. Have you been to the ER, urgent care clinic since your last visit? Hospitalized since your last visit? Yes, went to [facility] on 9/4/2017 for sprain right hand. 2. Have you seen or consulted any other health care providers outside of the [address] [place] [address] since your last visit? Include any pap smears or colon screening. No.     Patient presents with pain on his right hand He went to [facility] on 9/4/2017 for sprain right hand. The Norco is not helping and need a new medication instead.

## 2017-09-11 NOTE — PROGRESS NOTES
Li Longo is a 79 y.o. male and presents with Hand Pain (Right )       Subjective:    Was doing pushups against the wall and hurt right wrist. Went to the ER 9/4/17 and xrays were negative. dx'ed with strain. Still not better. Not help from [facility]. Pain is on radial side of wrist. Base of thumb area.   htn- well controlled. Taking meds and walking. No cp or LOCKETT. Obesity- gaining weight since stopping vit C tablets. Assessment/Plan:    Pain in snuff box- possible scapoid fx- pain not improving- negative xray. will refer to hand surgery for further assessment.   htn- controlled- no changes. Obesity- discussed caloric restriction and encouraged wt loss. RTC as scheduled. Orders Placed This Encounter    REFERRAL TO HAND SURGERY     Referral Priority:   Routine     Referral Type:   Consultation     Referral Reason:   Specialty Services Required       ROS:  Negative except as mentioned above  Cardiac- no chest pain or palpitations  Pulmonary- no sob or wheezes  GI- no n/v or diarrhea. SH:  Social History   Substance Use Topics    Smoking status: Former Smoker     Packs/day: 1.00     Years: 42.00     Types: Cigarettes     Quit date: 9/22/2016    Smokeless tobacco: Never Used    Alcohol use Yes      Comment: occasional         Medications/Allergies:  Current Outpatient Prescriptions on File Prior to Visit   Medication Sig Dispense Refill    losartan (COZAAR) 50 mg tablet Take 100 mg by mouth daily.  tamsulosin (FLOMAX) 0.4 mg capsule Take 1 Cap by mouth daily (after dinner). 90 Cap 3    metoprolol tartrate (LOPRESSOR) 50 mg tablet TAKE ONE TABLET BY MOUTH TWICE DAILY (Patient taking differently: 25 mg. TAKE ONE TABLET BY MOUTH TWICE DAILY) 60 Tab 2    insulin aspart (NOVOLOG FLEXPEN) 100 unit/mL inpn by SubCUTAneous route. 10 units subcutaneously 15 minutes before breakfast and 12 units 15 minutes before lunch and 12 units 15 minutes before dinner.  ZINC PO Take  by mouth.  Take 1 tablet by mouth daily.  insulin detemir (LEVEMIR FLEXTOUCH) 100 unit/mL (3 mL) inpn Inject 30 units subcutaneously in the evening (Patient taking differently: 25 Units. Inject 25 units subcutaneously in the evening) 15 mL 5    warfarin (COUMADIN) 3 mg tablet Take 1 Tab by mouth daily. (Patient taking differently: Take 3 mg by mouth daily. 9 mg on all other days. 6 mg on Tuesday and Thursday) 10 Tab 0    therapeutic multivitamin (THERA) tablet Take 1 Tab by mouth daily.  Insulin Needles, Disposable, (NOVOFINE 32) 32 gauge x 1/4\" ndle USE AS DIRECTED WITH LEVEMIR DAILY 100 Pen Needle 11    Lancets misc Dispense Accu Check Multi-Clix Drum - use to check blood sugar bid or as directed - 90 day supply 100 Each 3    glucose blood VI test strips (BLOOD GLUCOSE TEST) strip Test strips of patient choice - use to check blood sugar bid or as directed 200 Strip 3    Blood-Glucose Meter (ACCU-CHEK BRY) misc by Does Not Apply route.  glucose blood VI test strips (ACCU-CHEK SMARTVIEW) strip by Does Not Apply route See Admin Instructions.  Cholecalciferol, Vitamin D3, (VITAMIN D3) 2,000 unit cap daily. No current facility-administered medications on file prior to visit. Allergies   Allergen Reactions    Iodinated Contrast- Oral And Iv Dye Other (comments)    Iodine Other (comments)    Vasotec [Enalapril Maleate] Other (comments)     Thought to have possibly caused his pancreatitis       Objective:  Visit Vitals    /57    Pulse (!) 56    Temp 96.5 °F (35.8 °C) (Oral)    Resp 16    Ht 5' 9\" (1.753 m)    Wt 248 lb 6.4 oz (112.7 kg)    BMI 36.68 kg/m2    Body mass index is 36.68 kg/(m^2). Constitutional: Well developed, nourished, no distress, alert   Right wrist-  Pain in snuff box area with palpation. Normal rom . No erythema or swelling.

## 2017-09-13 ENCOUNTER — TELEPHONE (OUTPATIENT)
Dept: FAMILY MEDICINE CLINIC | Age: 67
End: 2017-09-13

## 2017-09-13 NOTE — TELEPHONE ENCOUNTER
I already spoke to patient regarding his appointment to see Dr. Danitza Queen on 9/26/2017 at 10:15am

## 2017-11-15 NOTE — TELEPHONE ENCOUNTER
Requested Prescriptions     Pending Prescriptions Disp Refills    metoprolol tartrate (LOPRESSOR) 50 mg tablet 60 Tab 2     Sig: TAKE ONE TABLET BY MOUTH TWICE DAILY     This patient was seen by Dr. Kerline Coronado on 9/11/2017.

## 2017-11-21 ENCOUNTER — TELEPHONE (OUTPATIENT)
Dept: FAMILY MEDICINE CLINIC | Age: 67
End: 2017-11-21

## 2017-11-21 RX ORDER — METOPROLOL TARTRATE 50 MG/1
25 TABLET ORAL 2 TIMES DAILY
Qty: 180 TAB | Refills: 0 | Status: SHIPPED | OUTPATIENT
Start: 2017-11-21 | End: 2017-11-22 | Stop reason: SDUPTHER

## 2017-11-21 NOTE — TELEPHONE ENCOUNTER
Lidya Contreras from [address] looking for clarification for metoprolol ordered via e-script today . ..    Call back number if [phone]

## 2017-11-22 RX ORDER — METOPROLOL TARTRATE 50 MG/1
25 TABLET ORAL DAILY
Qty: 180 TAB | Refills: 0 | Status: CANCELLED | OUTPATIENT
Start: 2017-11-22

## 2017-11-22 RX ORDER — METOPROLOL TARTRATE 50 MG/1
TABLET ORAL
Qty: 180 TAB | Refills: 1 | Status: SHIPPED | OUTPATIENT
Start: 2017-11-22 | End: 2018-08-09 | Stop reason: SDUPTHER

## 2017-11-22 NOTE — TELEPHONE ENCOUNTER
First attempt to reach patient for clarification on medication. Left voice message advising patient to call back. Once we receive clarification from patient, Dr. Katerina Serra will send in a new prescription.

## 2017-12-18 ENCOUNTER — OFFICE VISIT (OUTPATIENT)
Dept: FAMILY MEDICINE CLINIC | Age: 67
End: 2017-12-18

## 2017-12-18 VITALS
WEIGHT: 251.8 LBS | SYSTOLIC BLOOD PRESSURE: 124 MMHG | TEMPERATURE: 97.5 F | DIASTOLIC BLOOD PRESSURE: 60 MMHG | HEART RATE: 59 BPM | BODY MASS INDEX: 37.29 KG/M2 | RESPIRATION RATE: 16 BRPM | HEIGHT: 69 IN

## 2017-12-18 DIAGNOSIS — Z13.31 SCREENING FOR DEPRESSION: ICD-10-CM

## 2017-12-18 DIAGNOSIS — Z13.39 SCREENING FOR ALCOHOLISM: ICD-10-CM

## 2017-12-18 DIAGNOSIS — Z00.00 MEDICARE ANNUAL WELLNESS VISIT, SUBSEQUENT: ICD-10-CM

## 2017-12-18 DIAGNOSIS — Z23 ENCOUNTER FOR IMMUNIZATION: Primary | ICD-10-CM

## 2017-12-18 NOTE — PATIENT INSTRUCTIONS
Medicare Wellness Visit, Male    The best way to live healthy is to have a healthy lifestyle by eating a well-balanced diet, exercising regularly, limiting alcohol and stopping smoking. Regular physical exams and screening tests are another way to keep healthy. Preventive exams provided by your health care provider can find health problems before they become diseases or illnesses. Preventive services including immunizations, screening tests, monitoring and exams can help you take care of your own health. All people over age 72 should have a pneumovax  and and a prevnar shot to prevent pneumonia. These are once in a lifetime unless you and your provider decide differently. All people over 65 should have a yearly flu shot and a tetanus vaccine every 10 years. Screening for diabetes mellitus with a blood sugar test should be done every year. Glaucoma is a disease of the eye due to increased ocular pressure that can lead to blindness and it should be done every year by an eye professional.    Cardiovascular screening tests that check for elevated lipids (fatty part of blood) which can lead to heart disease and strokes should be done every 5 years. Colorectal screening that evaluates for blood or polyps in your colon should be done yearly as a stool test or every five years as a flexible sigmoidoscope or every 10 years as a colonoscopy up to age 76. Men up to age 76 may need a screening blood test for prostate cancer at certain intervals, depending on their personal and family history. This decision is between the patient and his provider. If you have been a smoker or had family history of abdominal aortic aneurysms, you and your provider may decide to schedule an ultrasound test of your aorta. Hepatitis C screening is also recommended for anyone born between [birth year] through [address]. A shingles vaccine is also recommended once in a lifetime after age 61.     Your Medicare Wellness Exam is recommended annually. Here is a list of your current Health Maintenance items with a due date:  Health Maintenance Due   Topic Date Due    Diabetic Foot Care  10/14/2016    Flu Vaccine  08/01/2017    Hemoglobin A1C    09/27/2017    Annual Well Visit  12/14/2017    Eye Exam  12/15/2017    Albumin Urine Test  12/21/2017            Advance Care Planning: Care Instructions  Your Care Instructions  It can be hard to live with an illness that cannot be cured. But if your health is getting worse, you may want to make decisions about end-of-life care. Planning for the end of your life does not mean that you are giving up. It is a way to make sure that your wishes are met. Clearly stating your wishes can make it easier for your loved ones. Making plans while you are still able may also ease your mind and make your final days less stressful and more meaningful. Follow-up care is a key part of your treatment and safety. Be sure to make and go to all appointments, and call your doctor if you are having problems. It's also a good idea to know your test results and keep a list of the medicines you take. What can you do to plan for the end of life? · You can bring these issues up with your doctor. You do not need to wait until your doctor starts the conversation. You might start with \"I would not be willing to live with . Silas Rosen Mar Silas Mar \" When you complete this sentence it helps your doctor understand your wishes. · Talk openly and honestly with your doctor. This is the best way to understand the decisions you will need to make as your health changes. Know that you can always change your mind. · Ask your doctor about commonly used life-support measures. These include tube feedings, breathing machines, and fluids given through a vein (IV). Understanding these treatments will help you decide whether you want them. · You may choose to have these life-supporting treatments for a limited time.  This allows a trial period to see whether they will help you. You may also decide that you want your doctor to take only certain measures to keep you alive. It is important to spell out these conditions so that your doctor and family understand them. · Talk to your doctor about how long you are likely to live. He or she may be able to give you an idea of what usually happens with your specific illness. · Think about preparing papers that state your wishes. This way there will not be any confusion about what you want. You can change your instructions at any time. Which papers should you prepare? Advance directives are legal papers that tell doctors how you want to be cared for at the end of your life. You do not need a  to write these papers. Ask your doctor or your state [facility] department for information on how to write your advance directives. They may have the forms for each of these types of papers. Make sure your doctor has a copy of these on file, and give a copy to a family member or close friend. · Consider a do-not-resuscitate order (DNR). This order asks that no extra treatments be done if your heart stops or you stop breathing. Extra treatments may include cardiopulmonary resuscitation (CPR), electrical shock to restart your heart, or a machine to breathe for you. If you decide to have a DNR order, ask your doctor to explain and write it. Place the order in your home where everyone can easily see it. · Consider a living will. A living will explains your wishes about life support and other treatments at the end of your life if you become unable to speak for yourself. Living padilla tell doctors to use or not use treatments that would keep you alive. You must have one or two witnesses or a notary present when you sign this form. · Consider a durable power of  for health care. This allows you to name a person to make decisions about your care if you are not able to. Most people ask a close friend or family member.  Talk to this person about the kinds of treatments you want and those that you do not want. Make sure this person understands your wishes. These legal papers are simple to change. Tell your doctor what you want to change, and ask him or her to make a note in your medical file. Give your family updated copies of the papers. Where can you learn more? Go to http://tayler-joan.info/. Enter P184 in the search box to learn more about \"Advance Care Planning: Care Instructions. \"  Current as of: November 17, 2016  Content Version: 11.3  © 1446-9461 [employer]. Care instructions adapted under license by [employer] (which disclaims liability or warranty for this information). If you have questions about a medical condition or this instruction, always ask your healthcare professional. [address] any warranty or liability for your use of this information. Learning About Asher Noland  What is a living will? A living will is a legal form you use to write down the kind of care you want at the end of your life. It is used by the health professionals who will treat you if you aren't able to decide for yourself. If you put your wishes in writing, your loved ones and others will know what kind of care you want. They won't need to guess. This can ease your mind and be helpful to others. A living will is not the same as an estate or property will. An estate will explains what you want to happen with your money and property after you die. Is a living will a legal document? A living will is a legal document. Each state has its own laws about living padilla. If you move to another state, make sure that your living will is legal in the state where you now live. Or you might use a universal form that has been approved by many states. This kind of form can sometimes be completed and stored online.  Your electronic copy will then be available wherever you have a connection to the Internet. In most cases, doctors will respect your wishes even if you have a form from a different state. · You don't need an  to complete a living will. But legal advice can be helpful if your state's laws are unclear, your health history is complicated, or your family can't agree on what should be in your living will. · You can change your living will at any time. Some people find that their wishes about end-of-life care change as their health changes. · In addition to making a living will, think about completing a medical power of  form. This form lets you name the person you want to make end-of-life treatment decisions for you (your \"health care agent\") if you're not able to. Many hospitals and nursing homes will give you the forms you need to complete a living will and a medical power of . · Your living will is used only if you can't make or communicate decisions for yourself anymore. If you become able to make decisions again, you can accept or refuse any treatment, no matter what you wrote in your living will. · Your state may offer an online registry. This is a place where you can store your living will online so the doctors and nurses who need to treat you can find it right away. What should you think about when creating a living will? Talk about your end-of-life wishes with your family members and your doctor. Let them know what you want. That way the people making decisions for you won't be surprised by your choices. Think about these questions as you make your living will:  · Do you know enough about life support methods that might be used? If not, talk to your doctor so you know what might be done if you can't breathe on your own, your heart stops, or you're unable to swallow. · What things would you still want to be able to do after you receive life-support methods? Would you want to be able to walk? To speak? To eat on your own?  To live without the help of machines? · If you have a choice, where do you want to be cared for? In your home? At a hospital or nursing home? · Do you want certain Church practices performed if you become very ill? · If you have a choice at the end of your life, where would you prefer to die? At home? In a hospital or nursing home? Somewhere else? · Would you prefer to be buried or cremated? · Do you want your organs to be donated after you die? What should you do with your living will? · Make sure that your family members and your health care agent have copies of your living will. · Give your doctor a copy of your living will to keep in your medical record. If you have more than one doctor, make sure that each one has a copy. · You may want to put a copy of your living will where it can be easily found. Where can you learn more? Go to http://tayler-joan.info/. Enter Y105 in the search box to learn more about \"Learning About Living Perroy. \"  Current as of: August 8, 2016  Content Version: 11.3  © 1893-0972 [employer]. Care instructions adapted under license by [employer] (which disclaims liability or warranty for this information). If you have questions about a medical condition or this instruction, always ask your healthcare professional. [address] any warranty or liability for your use of this information. Vaccine Information Statement    Influenza (Flu) Vaccine (Inactivated or Recombinant): What you need to know    Many Vaccine Information Statements are available in German and other languages. See www.immunize.org/vis  Hojas de Información Sobre Vacunas están disponibles en Español y en muchos otros idiomas. Visite www.immunize.org/vis    1. Why get vaccinated? Influenza (flu) is a contagious disease that spreads around the [place] every year, usually between October and May.      Flu is caused by influenza viruses, and is spread mainly by coughing, sneezing, and close contact. Anyone can get flu. Flu strikes suddenly and can last several days. Symptoms vary by age, but can include:   fever/chills   sore throat   muscle aches   fatigue   cough   headache    runny or stuffy nose    Flu can also lead to pneumonia and blood infections, and cause diarrhea and seizures in children. If you have a medical condition, such as heart or lung disease, flu can make it worse. Flu is more dangerous for some people. Infants and young children, people 72years of age and older, pregnant women, and people with certain health conditions or a weakened immune system are at greatest risk. Each year thousands of people in the [facility] die from flu, and many more are hospitalized. Flu vaccine can:   keep you from getting flu,   make flu less severe if you do get it, and   keep you from spreading flu to your family and other people. 2. Inactivated and recombinant flu vaccines    A dose of flu vaccine is recommended every flu season. Children 6 months through 6years of age may need two doses during the same flu season. Everyone else needs only one dose each flu season. Some inactivated flu vaccines contain a very small amount of a mercury-based preservative called thimerosal. Studies have not shown thimerosal in vaccines to be harmful, but flu vaccines that do not contain thimerosal are available. There is no live flu virus in flu shots. They cannot cause the flu. There are many flu viruses, and they are always changing. Each year a new flu vaccine is made to protect against three or four viruses that are likely to cause disease in the upcoming flu season. But even when the vaccine doesnt exactly match these viruses, it may still provide some protection    Flu vaccine cannot prevent:   flu that is caused by a virus not covered by the vaccine, or   illnesses that look like flu but are not.     It takes about 2 weeks for protection to develop after vaccination, and protection lasts through the flu season. 3. Some people should not get this vaccine    Tell the person who is giving you the vaccine:     If you have any severe, life-threatening allergies. If you ever had a life-threatening allergic reaction after a dose of flu vaccine, or have a severe allergy to any part of this vaccine, you may be advised not to get vaccinated. Most, but not all, types of flu vaccine contain a small amount of egg protein.  If you ever had Guillain-Barré Syndrome (also called GBS). Some people with a history of GBS should not get this vaccine. This should be discussed with your doctor.  If you are not feeling well. It is usually okay to get flu vaccine when you have a mild illness, but you might be asked to come back when you feel better. 4. Risks of a vaccine reaction    With any medicine, including vaccines, there is a chance of reactions. These are usually mild and go away on their own, but serious reactions are also possible. Most people who get a flu shot do not have any problems with it. Minor problems following a flu shot include:    soreness, redness, or swelling where the shot was given     hoarseness   sore, red or itchy eyes   cough   fever   aches   headache   itching   fatigue  If these problems occur, they usually begin soon after the shot and last 1 or 2 days. More serious problems following a flu shot can include the following:     There may be a small increased risk of Guillain-Barré Syndrome (GBS) after inactivated flu vaccine. This risk has been estimated at 1 or 2 additional cases per million people vaccinated. This is much lower than the risk of severe complications from flu, which can be prevented by flu vaccine.        Young children who get the flu shot along with pneumococcal vaccine (PCV13) and/or DTaP vaccine at the same time might be slightly more likely to have a seizure caused by fever. Ask your doctor for more information. Tell your doctor if a child who is getting flu vaccine has ever had a seizure. Problems that could happen after any injected vaccine:      People sometimes faint after a medical procedure, including vaccination. Sitting or lying down for about 15 minutes can help prevent fainting, and injuries caused by a fall. Tell your doctor if you feel dizzy, or have vision changes or ringing in the ears.  Some people get severe pain in the shoulder and have difficulty moving the arm where a shot was given. This happens very rarely.  Any medication can cause a severe allergic reaction. Such reactions from a vaccine are very rare, estimated at about 1 in a million doses, and would happen within a few minutes to a few hours after the vaccination. As with any medicine, there is a very remote chance of a vaccine causing a serious injury or death. The safety of vaccines is always being monitored. For more information, visit: www.cdc.gov/vaccinesafety/    5. What if there is a serious reaction? What should I look for?  Look for anything that concerns you, such as signs of a severe allergic reaction, very high fever, or unusual behavior. Signs of a severe allergic reaction can include hives, swelling of the face and throat, difficulty breathing, a fast heartbeat, dizziness, and weakness  usually within a few minutes to a few hours after the vaccination. What should I do?  If you think it is a severe allergic reaction or other emergency that cant wait, call 9-1-1 and get the person to the nearest hospital. Otherwise, call your doctor.  Reactions should be reported to the Vaccine Adverse Event Reporting System (VAERS). Your doctor should file this report, or you can do it yourself through  the VAERS web site at www.vaers. [facility].gov, or by calling 1-752.575.6547. VAERS does not give medical advice.     6. The National Vaccine Injury Compensation Program    The [employer] Injury Compensation Program (VICP) is a federal program that was created to compensate people who may have been injured by certain vaccines. Persons who believe they may have been injured by a vaccine can learn about the program and about filing a claim by calling 5-675.845.1604 or visiting the [employer] website at www.[facility].gov/vaccinecompensation. There is a time limit to file a claim for compensation. 7. How can I learn more?  Ask your healthcare provider. He or she can give you the vaccine package insert or suggest other sources of information.  Call your local or state health department.  Contact the Centers for Disease Control and Prevention (CDC):  - Call 7-286.752.8057 (1-800-CDC-INFO) or  - Visit CDCs website at www.cdc.gov/flu    Vaccine Information Statement   Inactivated Influenza Vaccine   8/7/2015  42 PADMINI Cotter [id number]    Department of Health and Human Services  Centers for Disease Control and Prevention    Office Use Only       Influenza (Flu) Vaccine (Inactivated or Recombinant): What You Need to Know  Why get vaccinated? Influenza (\"flu\") is a contagious disease that spreads around the [place] every winter, usually between October and May. Flu is caused by influenza viruses and is spread mainly by coughing, sneezing, and close contact. Anyone can get flu. Flu strikes suddenly and can last several days. Symptoms vary by age, but can include:  · Fever/chills. · Sore throat. · Muscle aches. · Fatigue. · Cough. · Headache. · Runny or stuffy nose. Flu can also lead to pneumonia and blood infections, and cause diarrhea and seizures in children. If you have a medical condition, such as heart or lung disease, flu can make it worse. Flu is more dangerous for some people.  Infants and young children, people 72years of age and older, pregnant women, and people with certain health conditions or a weakened immune system are at greatest risk.  Each year thousands of people in the [facility] die from flu, and many more are hospitalized. Flu vaccine can:  · Keep you from getting flu. · Make flu less severe if you do get it. · Keep you from spreading flu to your family and other people. Inactivated and recombinant flu vaccines  A dose of flu vaccine is recommended every flu season. Children 6 months through 6years of age may need two doses during the same flu season. Everyone else needs only one dose each flu season. Some inactivated flu vaccines contain a very small amount of a mercury-based preservative called thimerosal. Studies have not shown thimerosal in vaccines to be harmful, but flu vaccines that do not contain thimerosal are available. There is no live flu virus in flu shots. They cannot cause the flu. There are many flu viruses, and they are always changing. Each year a new flu vaccine is made to protect against three or four viruses that are likely to cause disease in the upcoming flu season. But even when the vaccine doesn't exactly match these viruses, it may still provide some protection. Flu vaccine cannot prevent:  · Flu that is caused by a virus not covered by the vaccine. · Illnesses that look like flu but are not. Some people should not get this vaccine  Tell the person who is giving you the vaccine:  · If you have any severe (life-threatening) allergies. If you ever had a life-threatening allergic reaction after a dose of flu vaccine, or have a severe allergy to any part of this vaccine, you may be advised not to get vaccinated. Most, but not all, types of flu vaccine contain a small amount of egg protein. · If you ever had Guillain-Barré syndrome (also called GBS) Some people with a history of GBS should not get this vaccine. This should be discussed with your doctor. · If you are not feeling well.  It is usually okay to get flu vaccine when you have a mild illness, but you might be asked to come back when you feel better. Risks of a vaccine reaction  With any medicine, including vaccines, there is a chance of reactions. These are usually mild and go away on their own, but serious reactions are also possible. Most people who get a flu shot do not have any problems with it. Minor problems following a flu shot include:  · Soreness, redness, or swelling where the shot was given  · Hoarseness  · Sore, red or itchy eyes  · Cough  · Fever  · Aches  · Headache  · Itching  · Fatigue  If these problems occur, they usually begin soon after the shot and last 1 or 2 days. More serious problems following a flu shot can include the following:  · There may be a small increased risk of Guillain-Barré Syndrome (GBS) after inactivated flu vaccine. This risk has been estimated at 1 or 2 additional cases per million people vaccinated. This is much lower than the risk of severe complications from flu, which can be prevented by flu vaccine. · [facility] children who get the flu shot along with pneumococcal vaccine (PCV13) and/or DTaP vaccine at the same time might be slightly more likely to have a seizure caused by fever. Ask your doctor for more information. Tell your doctor if a child who is getting flu vaccine has ever had a seizure  Problems that could happen after any injected vaccine:  · People sometimes faint after a medical procedure, including vaccination. Sitting or lying down for about 15 minutes can help prevent fainting, and injuries caused by a fall. Tell your doctor if you feel dizzy, or have vision changes or ringing in the ears. · Some people get severe pain in the shoulder and have difficulty moving the arm where a shot was given. This happens very rarely. · Any medication can cause a severe allergic reaction. Such reactions from a vaccine are very rare, estimated at about 1 in a million doses, and would happen within a few minutes to a few hours after the vaccination.   As with any medicine, there is a very remote chance of a vaccine causing a serious injury or death. The safety of vaccines is always being monitored. For more information, visit: www.cdc.gov/vaccinesafety/. What if there is a serious reaction? What should I look for? · Look for anything that concerns you, such as signs of a severe allergic reaction, very high fever, or unusual behavior. Signs of a severe allergic reaction can include hives, swelling of the face and throat, difficulty breathing, a fast heartbeat, dizziness, and weakness - usually within a few minutes to a few hours after the vaccination. What should I do? · If you think it is a severe allergic reaction or other emergency that can't wait, call 9-1-1 and get the person to the nearest hospital. Otherwise, call your doctor. · Reactions should be reported to the \"Vaccine Adverse Event Reporting System\" (VAERS). Your doctor should file this report, or you can do it yourself through the VAERS website at www.vaers. [employer].gov, or by calling 3-705.594.3480. [employer] does not give medical advice. The National Vaccine Injury Compensation Program  The National Vaccine Injury Compensation Program (VICP) is a federal program that was created to compensate people who may have been injured by certain vaccines. Persons who believe they may have been injured by a vaccine can learn about the program and about filing a claim by calling 3-673.178.9100 or visiting the [employer] website at www.[facility].gov/vaccinecompensation. There is a time limit to file a claim for compensation. How can I learn more? · Ask your healthcare provider. He or she can give you the vaccine package insert or suggest other sources of information. · Call your local or state health department. · Contact the Centers for Disease Control and Prevention (CDC):  ¨ Call 6-884.659.5368 (1-800-CDC-INFO) or  ¨ Visit CDC's website at www.cdc.gov/flu  Vaccine Information Statement  Inactivated Influenza Vaccine  8/7/2015)  42  Annamarie Coyne 613MR-39  Department of Health and Human Services  Centers for Disease Control and Prevention  Many Vaccine Information Statements are available in Vietnamese and other languages. See www.immunize.org/vis. Muchas hojas de información sobre vacunas están disponibles en español y en otros idiomas. Visite www.immunize.org/vis. Care instructions adapted under license by [employer] (which disclaims liability or warranty for this information). If you have questions about a medical condition or this instruction, always ask your healthcare professional. [address] any warranty or liability for your use of this information.

## 2017-12-18 NOTE — PROGRESS NOTES
Ceruminosis is noted. Wax is removed by syringing and manual debridement. Instructions for home care to prevent wax buildup are given.

## 2017-12-18 NOTE — PROGRESS NOTES
1. Have you been to the ER, urgent care clinic since your last visit? Hospitalized since your last visit? No.     2. Have you seen or consulted any other health care providers outside of the [address] [place] [address] since your last visit? Include any pap smears or colon screening.  No.    Chief Complaint   Patient presents with   [facility] Wellness Visit

## 2017-12-18 NOTE — MR AVS SNAPSHOT
Visit Information Date & Time Provider Department Dept. Phone Encounter #  
 12/18/2017  3:00 PM Dilan Rubio, [facility] [phone] [id number] Follow-up Instructions Return in about 3 months (around 3/18/2018) for 30 minutes. Your Appointments 6/11/2018 10:15 AM  
ESTABLISHED PATIENT with Miley Aguilar MD  
[facility] Appt Note: 8 MONTH F/U WITH PVR  
 [address] 2201 [facility] 2 [facility] 68 [id number]  
  
    
 12/20/2018  3:00 PM  
Office Visit with Dilan Rubio MD  
[address]) Appt Note: 295 [facility] 68 [facility] Marc. [address] 500 [facility]  
  
   
 [id number]  62[facility] Upcoming Health Maintenance Date Due  
 FOOT EXAM Q1 10/14/2016 Influenza Age 5 to Adult 8/1/2017 HEMOGLOBIN A1C Q6M 9/27/2017 MEDICARE YEARLY EXAM 12/14/2017 EYE EXAM RETINAL OR DILATED Q1 12/15/2017 MICROALBUMIN Q1 12/21/2017 LIPID PANEL Q1 3/27/2018 Prostate-Specific Antigen 10/10/2018 GLAUCOMA SCREENING Q2Y 12/15/2018 Pneumococcal 65+ Low/Medium Risk (2 of 2 - PPSV23) 12/18/2018 COLONOSCOPY 1/13/2020 DTaP/Tdap/Td series (2 - Td) 12/18/2027 Allergies as of 12/18/2017  Review Complete On: 12/18/2017 By: Dilan Rubio MD  
  
 Severity Noted Reaction Type Reactions Iodinated Contrast- Oral And Iv Dye  09/13/2016    Other (comments) Iodine  07/27/2015    Other (comments) Vasotec [Enalapril Maleate]  12/16/2016    Other (comments) Thought to have possibly caused his pancreatitis Current Immunizations  Reviewed on 7/17/2017 Name Date Influenza High Dose Vaccine PF  Incomplete, 10/22/2015 Influenza Vaccine 12/1/2014 Zoster Vaccine, Live 1/1/2015 Not reviewed this visit You Were Diagnosed With   
  
 Codes Comments Encounter for immunization    -  Primary ICD-10-CM: C15 ICD-9-CM: V03.89 Medicare annual wellness visit, subsequent     ICD-10-CM: Z00.00 ICD-9-CM: V70.0 Screening for alcoholism     ICD-10-CM: Z13.89 ICD-9-CM: V79.1 Screening for depression     ICD-10-CM: Z13.89 ICD-9-CM: V79.0 Vitals BP Pulse Temp Resp Height(growth percentile) Weight(growth percentile) 124/60 (!) 59 97.5 °F (36.4 °C) (Oral) 16 5' 9\" (1.753 m) 251 lb 12.8 oz (114.2 kg) BMI Smoking Status 37.18 kg/m2 Former Smoker BMI and BSA Data Body Mass Index Body Surface Area  
 37.18 kg/m 2 2.36 m 2 Preferred Pharmacy Pharmacy Name Phone [address] [phone] Your Updated Medication List  
  
   
This list is accurate as of: 12/18/17  3:40 PM.  Always use your most recent med list.  
  
  
  
  
 Clark Pel Generic drug:  Blood-Glucose Meter  
by Does Not Apply route. * ACCU-CHEK SMARTVIEW TEST STRIP strip Generic drug:  glucose blood VI test strips  
by Does Not Apply route See Admin Instructions. * glucose blood VI test strips strip Commonly known as:  blood glucose test  
Test strips of patient choice - use to check blood sugar bid or as directed  
  
 acetaminophen 500 mg tablet Commonly known as:  TYLENOL  
500 mg. amLODIPine 10 mg tablet Commonly known as:  Tad Rukhsana Take  by mouth daily. FOLIC ACID PO Take  by Mouth. insulin detemir 100 unit/mL (3 mL) Inpn Commonly known as:  Zakia Pérez Inject 30 units subcutaneously in the evening Insulin Needles (Disposable) 32 gauge x 1/4\" Ndle Commonly known as:  NOVOFINE 32 USE AS DIRECTED WITH LEVEMIR DAILY Lancets [facility] Dispense Accu Check Multi-Clix Drum - use to check blood sugar bid or as directed - 90 day supply  
  
 losartan 50 mg tablet Commonly known as:  COZAAR  
 Take 100 mg by mouth daily. metoprolol tartrate 50 mg tablet Commonly known as:  LOPRESSOR  
TAKE ONE TABLET BY MOUTH TWICE DAILY NovoLOG Flexpen 100 unit/mL Inpn Generic drug:  insulin aspart  
by SubCUTAneous route. 10 units subcutaneously 15 minutes before breakfast and 12 units 15 minutes before lunch and 12 units 15 minutes before dinner. tamsulosin 0.4 mg capsule Commonly known as:  FLOMAX Take 1 Cap by mouth daily (after dinner). THERA tablet Generic drug:  therapeutic multivitamin Take 1 Tab by mouth daily. VITAMIN D3 2,000 unit Cap capsule Generic drug:  Cholecalciferol (Vitamin D3)  
daily. warfarin 3 mg tablet Commonly known as:  COUMADIN Take 1 Tab by mouth daily. ZINC PO Take  by mouth. Take 1 tablet by mouth daily. * Notice: This list has 2 medication(s) that are the same as other medications prescribed for you. Read the directions carefully, and ask your doctor or other care provider to review them with you. We Performed the Following [facility] [[id number]] INFLUENZA VIRUS VACCINE, HIGH DOSE SEASONAL, PRESERVATIVE FREE [295[address] CPT(R)] [place] ANNUAL ALCOHOL SCREEN 15 MIN [id number] [facility]] Follow-up Instructions Return in about 3 months (around 3/18/2018) for 30 minutes. Patient Instructions Medicare Wellness Visit, Male The best way to live healthy is to have a healthy lifestyle by eating a well-balanced diet, exercising regularly, limiting alcohol and stopping smoking. Regular physical exams and screening tests are another way to keep healthy. Preventive exams provided by your health care provider can find health problems before they become diseases or illnesses. Preventive services including immunizations, screening tests, monitoring and exams can help you take care of your own health.  
 
All people over age 72 should have a pneumovax  and and a prevnar shot to prevent pneumonia. These are once in a lifetime unless you and your provider decide differently. All people over 65 should have a yearly flu shot and a tetanus vaccine every 10 years. Screening for diabetes mellitus with a blood sugar test should be done every year. Glaucoma is a disease of the eye due to increased ocular pressure that can lead to blindness and it should be done every year by an eye professional. 
 
Cardiovascular screening tests that check for elevated lipids (fatty part of blood) which can lead to heart disease and strokes should be done every 5 years. Colorectal screening that evaluates for blood or polyps in your colon should be done yearly as a stool test or every five years as a flexible sigmoidoscope or every 10 years as a colonoscopy up to age 76. Men up to age 76 may need a screening blood test for prostate cancer at certain intervals, depending on their personal and family history. This decision is between the patient and his provider. If you have been a smoker or had family history of abdominal aortic aneurysms, you and your provider may decide to schedule an ultrasound test of your aorta. Hepatitis C screening is also recommended for anyone born between [birth year] through [address]. A shingles vaccine is also recommended once in a lifetime after age 61. Your Medicare Wellness Exam is recommended annually. Here is a list of your current Health Maintenance items with a due date: 
Health Maintenance Due Topic Date Due  
 Diabetic Foot Care  10/14/2016  Flu Vaccine  08/01/2017  Hemoglobin A1C    09/27/2017 Layo Wagoner Annual Well Visit  12/14/2017  Eye Exam  12/15/2017  Albumin Urine Test  12/21/2017 Advance Care Planning: Care Instructions Your Care Instructions It can be hard to live with an illness that cannot be cured.  But if your health is getting worse, you may want to make decisions about end-of-life care. Planning for the end of your life does not mean that you are giving up. It is a way to make sure that your wishes are met. Clearly stating your wishes can make it easier for your loved ones. Making plans while you are still able may also ease your mind and make your final days less stressful and more meaningful. Follow-up care is a key part of your treatment and safety. Be sure to make and go to all appointments, and call your doctor if you are having problems. It's also a good idea to know your test results and keep a list of the medicines you take. What can you do to plan for the end of life? · You can bring these issues up with your doctor. You do not need to wait until your doctor starts the conversation. You might start with \"I would not be willing to live with . Joann Aquino \" When you complete this sentence it helps your doctor understand your wishes. · Talk openly and honestly with your doctor. This is the best way to understand the decisions you will need to make as your health changes. Know that you can always change your mind. · Ask your doctor about commonly used life-support measures. These include tube feedings, breathing machines, and fluids given through a vein (IV). Understanding these treatments will help you decide whether you want them. · You may choose to have these life-supporting treatments for a limited time. This allows a trial period to see whether they will help you. You may also decide that you want your doctor to take only certain measures to keep you alive. It is important to spell out these conditions so that your doctor and family understand them. · Talk to your doctor about how long you are likely to live. He or she may be able to give you an idea of what usually happens with your specific illness. · Think about preparing papers that state your wishes. This way there will not be any confusion about what you want. You can change your instructions at any time. Which papers should you prepare? Advance directives are legal papers that tell doctors how you want to be cared for at the end of your life. You do not need a  to write these papers. Ask your doctor or your state health department for information on how to write your advance directives. They may have the forms for each of these types of papers. Make sure your doctor has a copy of these on file, and give a copy to a family member or close friend. · Consider a do-not-resuscitate order (DNR). This order asks that no extra treatments be done if your heart stops or you stop breathing. Extra treatments may include cardiopulmonary resuscitation (CPR), electrical shock to restart your heart, or a machine to breathe for you. If you decide to have a DNR order, ask your doctor to explain and write it. Place the order in your home where everyone can easily see it. · Consider a living will. A living will explains your wishes about life support and other treatments at the end of your life if you become unable to speak for yourself. Living padilla tell doctors to use or not use treatments that would keep you alive. You must have one or two witnesses or a notary present when you sign this form. · Consider a durable power of  for health care. This allows you to name a person to make decisions about your care if you are not able to. Most people ask a close friend or family member. Talk to this person about the kinds of treatments you want and those that you do not want. Make sure this person understands your wishes. These legal papers are simple to change. Tell your doctor what you want to change, and ask him or her to make a note in your medical file. Give your family updated copies of the papers. Where can you learn more? Go to http://tayler-joan.info/. Enter P184 in the search box to learn more about \"Advance Care Planning: Care Instructions. \" Current as of: November 17, 2016 Content Version: 11.3 © 2677-6480 [employer]. Care instructions adapted under license by [employer] (which disclaims liability or warranty for this information). If you have questions about a medical condition or this instruction, always ask your healthcare professional. [address] any warranty or liability for your use of this information. Mejia Gonsalez 8702 What is a living will? A living will is a legal form you use to write down the kind of care you want at the end of your life. It is used by the health professionals who will treat you if you aren't able to decide for yourself. If you put your wishes in writing, your loved ones and others will know what kind of care you want. They won't need to guess. This can ease your mind and be helpful to others. A living will is not the same as an estate or property will. An estate will explains what you want to happen with your money and property after you die. Is a living will a legal document? A living will is a legal document. Each state has its own laws about living padilla. If you move to another state, make sure that your living will is legal in the state where you now live. Or you might use a universal form that has been approved by many states. This kind of form can sometimes be completed and stored online. Your electronic copy will then be available wherever you have a connection to the Internet. In most cases, doctors will respect your wishes even if you have a form from a different state. · You don't need an  to complete a living will. But legal advice can be helpful if your state's laws are unclear, your health history is complicated, or your family can't agree on what should be in your living will. · You can change your living will at any time. Some people find that their wishes about end-of-life care change as their health changes. · In addition to making a living will, think about completing a medical power of  form. This form lets you name the person you want to make end-of-life treatment decisions for you (your \"health care agent\") if you're not able to. Many hospitals and nursing homes will give you the forms you need to complete a living will and a medical power of . · Your living will is used only if you can't make or communicate decisions for yourself anymore. If you become able to make decisions again, you can accept or refuse any treatment, no matter what you wrote in your living will. · Your state may offer an online registry. This is a place where you can store your living will online so the doctors and nurses who need to treat you can find it right away. What should you think about when creating a living will? Talk about your end-of-life wishes with your family members and your doctor. Let them know what you want. That way the people making decisions for you won't be surprised by your choices. Think about these questions as you make your living will: · Do you know enough about life support methods that might be used? If not, talk to your doctor so you know what might be done if you can't breathe on your own, your heart stops, or you're unable to swallow. · What things would you still want to be able to do after you receive life-support methods? Would you want to be able to walk? To speak? To eat on your own? To live without the help of machines? · If you have a choice, where do you want to be cared for? In your home? At a hospital or nursing home? · Do you want certain Latter day practices performed if you become very ill? · If you have a choice at the end of your life, where would you prefer to die? At home? In a hospital or nursing home? Somewhere else? · Would you prefer to be buried or cremated? · Do you want your organs to be donated after you die? What should you do with your living will? · Make sure that your family members and your health care agent have copies of your living will. · Give your doctor a copy of your living will to keep in your medical record. If you have more than one doctor, make sure that each one has a copy. · You may want to put a copy of your living will where it can be easily found. Where can you learn more? Go to http://tayler-joan.info/. Enter H730 in the search box to learn more about \"Learning About Living Judy. \" Current as of: August 8, 2016 Content Version: 11.3 © 7563-9428 [employer]. Care instructions adapted under license by [employer] (which disclaims liability or warranty for this information). If you have questions about a medical condition or this instruction, always ask your healthcare professional. [address] any warranty or liability for your use of this information. Vaccine Information Statement Influenza (Flu) Vaccine (Inactivated or Recombinant): What you need to know Many Vaccine Information Statements are available in Occitan and other languages. See www.immunize.org/vis Hojas de Información Sobre Vacunas están disponibles en Español y en muchos otros idiomas. Visite www.immunize.org/vis 1. Why get vaccinated? Influenza (flu) is a contagious disease that spreads around the [place] every year, usually between October and May. Flu is caused by influenza viruses, and is spread mainly by coughing, sneezing, and close contact. Anyone can get flu. Flu strikes suddenly and can last several days. Symptoms vary by age, but can include: 
 fever/chills  sore throat  muscle aches  fatigue  cough  headache  runny or stuffy nose Flu can also lead to pneumonia and blood infections, and cause diarrhea and seizures in children. If you have a medical condition, such as heart or lung disease, flu can make it worse. Flu is more dangerous for some people. Infants and young children, people 72years of age and older, pregnant women, and people with certain health conditions or a weakened immune system are at greatest risk. Each year thousands of people in the [facility] die from flu, and many more are hospitalized. Flu vaccine can: 
 keep you from getting flu, 
 make flu less severe if you do get it, and 
 keep you from spreading flu to your family and other people. 2. Inactivated and recombinant flu vaccines A dose of flu vaccine is recommended every flu season. Children 6 months through 6years of age may need two doses during the same flu season. Everyone else needs only one dose each flu season. Some inactivated flu vaccines contain a very small amount of a mercury-based preservative called thimerosal. Studies have not shown thimerosal in vaccines to be harmful, but flu vaccines that do not contain thimerosal are available. There is no live flu virus in flu shots. They cannot cause the flu. There are many flu viruses, and they are always changing. Each year a new flu vaccine is made to protect against three or four viruses that are likely to cause disease in the upcoming flu season. But even when the vaccine doesnt exactly match these viruses, it may still provide some protection Flu vaccine cannot prevent: 
 flu that is caused by a virus not covered by the vaccine, or 
 illnesses that look like flu but are not. It takes about 2 weeks for protection to develop after vaccination, and protection lasts through the flu season. 3. Some people should not get this vaccine Tell the person who is giving you the vaccine:  If you have any severe, life-threatening allergies.    
If you ever had a life-threatening allergic reaction after a dose of flu vaccine, or have a severe allergy to any part of this vaccine, you may be advised not to get vaccinated. Most, but not all, types of flu vaccine contain a small amount of egg protein.  If you ever had Guillain-Barré Syndrome (also called GBS). Some people with a history of GBS should not get this vaccine. This should be discussed with your doctor.  If you are not feeling well. It is usually okay to get flu vaccine when you have a mild illness, but you might be asked to come back when you feel better. 4. Risks of a vaccine reaction With any medicine, including vaccines, there is a chance of reactions. These are usually mild and go away on their own, but serious reactions are also possible. Most people who get a flu shot do not have any problems with it. Minor problems following a flu shot include:  
 soreness, redness, or swelling where the shot was given  hoarseness  sore, red or itchy eyes  cough  fever  aches  headache  itching  fatigue If these problems occur, they usually begin soon after the shot and last 1 or 2 days. More serious problems following a flu shot can include the following:  There may be a small increased risk of Guillain-Barré Syndrome (GBS) after inactivated flu vaccine. This risk has been estimated at 1 or 2 additional cases per million people vaccinated. This is much lower than the risk of severe complications from flu, which can be prevented by flu vaccine.  Young children who get the flu shot along with pneumococcal vaccine (PCV13) and/or DTaP vaccine at the same time might be slightly more likely to have a seizure caused by fever. Ask your doctor for more information. Tell your doctor if a child who is getting flu vaccine has ever had a seizure. Problems that could happen after any injected vaccine:  People sometimes faint after a medical procedure, including vaccination.  Sitting or lying down for about 15 minutes can help prevent fainting, and injuries caused by a fall. Tell your doctor if you feel dizzy, or have vision changes or ringing in the ears.  Some people get severe pain in the shoulder and have difficulty moving the arm where a shot was given. This happens very rarely.  Any medication can cause a severe allergic reaction. Such reactions from a vaccine are very rare, estimated at about 1 in a million doses, and would happen within a few minutes to a few hours after the vaccination. As with any medicine, there is a very remote chance of a vaccine causing a serious injury or death. The safety of vaccines is always being monitored. For more information, visit: www.cdc.gov/vaccinesafety/ 
 
 
The [facility] Vaccine Injury Compensation Program (VICP) is a federal program that was created to compensate people who may have been injured by certain vaccines.  
 
Persons who believe they may have been injured by a vaccine can learn about the program and about filing a claim by calling 0-697.389.7502 or visiting the [employer] website at www.[facility].gov/vaccinecompensation. There is a time limit to file a claim for compensation. 7. How can I learn more?  Ask your healthcare provider. He or she can give you the vaccine package insert or suggest other sources of information.  Call your local or state health department.  Contact the Centers for Disease Control and Prevention (CDC): 
- Call 6-747.273.8245 (1-800-CDC-INFO) or 
- Visit CDCs website at www.cdc.gov/flu Vaccine Information Statement Inactivated Influenza Vaccine 8/7/2015 
[address] Tunde Rebolledo [id number]-84 [facility] and [employer] Centers for Disease Control and Prevention Office Use Only Influenza (Flu) Vaccine (Inactivated or Recombinant): What You Need to Know Why get vaccinated? Influenza (\"flu\") is a contagious disease that spreads around the [place] every winter, usually between October and May. Flu is caused by influenza viruses and is spread mainly by coughing, sneezing, and close contact. Anyone can get flu. Flu strikes suddenly and can last several days. Symptoms vary by age, but can include: · Fever/chills. · Sore throat. · Muscle aches. · Fatigue. · Cough. · Headache. · Runny or stuffy nose. Flu can also lead to pneumonia and blood infections, and cause diarrhea and seizures in children. If you have a medical condition, such as heart or lung disease, flu can make it worse. Flu is more dangerous for some people. Infants and young children, people 72years of age and older, pregnant women, and people with certain health conditions or a weakened immune system are at greatest risk. Each year thousands of people in the [facility] die from flu, and many more are hospitalized. Flu vaccine can: · Keep you from getting flu. · Make flu less severe if you do get it. · Keep you from spreading flu to your family and other people. Inactivated and recombinant flu vaccines A dose of flu vaccine is recommended every flu season. Children 6 months through 6years of age may need two doses during the same flu season. Everyone else needs only one dose each flu season. Some inactivated flu vaccines contain a very small amount of a mercury-based preservative called thimerosal. Studies have not shown thimerosal in vaccines to be harmful, but flu vaccines that do not contain thimerosal are available. There is no live flu virus in flu shots. They cannot cause the flu. There are many flu viruses, and they are always changing. Each year a new flu vaccine is made to protect against three or four viruses that are likely to cause disease in the upcoming flu season. But even when the vaccine doesn't exactly match these viruses, it may still provide some protection. Flu vaccine cannot prevent: · Flu that is caused by a virus not covered by the vaccine. · Illnesses that look like flu but are not. Some people should not get this vaccine Tell the person who is giving you the vaccine: · If you have any severe (life-threatening) allergies. If you ever had a life-threatening allergic reaction after a dose of flu vaccine, or have a severe allergy to any part of this vaccine, you may be advised not to get vaccinated. Most, but not all, types of flu vaccine contain a small amount of egg protein. · If you ever had Guillain-Barré syndrome (also called GBS) Some people with a history of GBS should not get this vaccine. This should be discussed with your doctor. · If you are not feeling well. It is usually okay to get flu vaccine when you have a mild illness, but you might be asked to come back when you feel better. Risks of a vaccine reaction With any medicine, including vaccines, there is a chance of reactions. These are usually mild and go away on their own, but serious reactions are also possible. Most people who get a flu shot do not have any problems with it. Minor problems following a flu shot include: · Soreness, redness, or swelling where the shot was given · Hoarseness · Sore, red or itchy eyes · Cough · Fever · Aches · Headache · Itching · Fatigue If these problems occur, they usually begin soon after the shot and last 1 or 2 days. More serious problems following a flu shot can include the following: · There may be a small increased risk of Guillain-Barré Syndrome (GBS) after inactivated flu vaccine. This risk has been estimated at 1 or 2 additional cases per million people vaccinated. This is much lower than the risk of severe complications from flu, which can be prevented by flu vaccine. · Isabel Croft children who get the flu shot along with pneumococcal vaccine (PCV13) and/or DTaP vaccine at the same time might be slightly more likely to have a seizure caused by fever. Ask your doctor for more information. Tell your doctor if a child who is getting flu vaccine has ever had a seizure Problems that could happen after any injected vaccine: · People sometimes faint after a medical procedure, including vaccination. Sitting or lying down for about 15 minutes can help prevent fainting, and injuries caused by a fall. Tell your doctor if you feel dizzy, or have vision changes or ringing in the ears. · Some people get severe pain in the shoulder and have difficulty moving the arm where a shot was given. This happens very rarely. · Any medication can cause a severe allergic reaction. Such reactions from a vaccine are very rare, estimated at about 1 in a million doses, and would happen within a few minutes to a few hours after the vaccination. As with any medicine, there is a very remote chance of a vaccine causing a serious injury or death. The safety of vaccines is always being monitored. For more information, visit: www.cdc.gov/vaccinesafety/. What if there is a serious reaction? What should I look for? · Look for anything that concerns you, such as signs of a severe allergic reaction, very high fever, or unusual behavior. Signs of a severe allergic reaction can include hives, swelling of the face and throat, difficulty breathing, a fast heartbeat, dizziness, and weakness - usually within a few minutes to a few hours after the vaccination. What should I do? · If you think it is a severe allergic reaction or other emergency that can't wait, call 9-1-1 and get the person to the nearest hospital. Otherwise, call your doctor. · Reactions should be reported to the \"Vaccine Adverse Event Reporting System\" (VAERS). Your doctor should file this report, or you can do it yourself through the VAERS website at www.vaers. [facility].gov, or by calling 4-484.957.3699. [employer] does not give medical advice. The National Vaccine Injury Compensation Program 
The National Vaccine Injury Compensation Program (VICP) is a federal program that was created to compensate people who may have been injured by certain vaccines. Persons who believe they may have been injured by a vaccine can learn about the program and about filing a claim by calling 6-129.448.1168 or visiting the [employer] website at www.[place].gov/vaccinecompensation. There is a time limit to file a claim for compensation. How can I learn more? · Ask your healthcare provider. He or she can give you the vaccine package insert or suggest other sources of information. · Call your local or state health department. · Contact the Centers for Disease Control and Prevention (CDC): 
¨ Call 7-911.523.4610 (1-800-CDC-INFO) or ¨ Visit CDC's website at www.cdc.gov/flu Vaccine Information Statement Inactivated Influenza Vaccine 8/7/2015) [address] 640RS-15 Department of [facility] and [employer] Centers for Disease Control and Prevention Many Vaccine Information Statements are available in [place] and other languages. See www.immunize.org/vis. Muchas hojas de información sobre vacunas están disponibles en español y en otros idiomas. Visite www.immunize.org/vis. Care instructions adapted under license by [employer] (which disclaims liability or warranty for this information). If you have questions about a medical condition or this instruction, always ask your healthcare professional. [address] any warranty or liability for your use of this information. Introducing [facility]! [facility] introduces citibuddies patient portal. Now you can access parts of your medical record, email your doctor's office, and request medication refills online. 1. In your internet browser, go to [email or web address] [facility] 2. Click on the First Time User? Click Here link in the Sign In box. You will see the New Member Sign Up page. 3. Enter your citibuddies Access Code exactly as it appears below. You will not need to use this code after youve completed the sign-up process. If you do not sign up before the expiration date, you must request a new code. · citibuddies Access Code: [email or web address] Expires: 3/18/2018  3:23 PM 
 
4. Enter the last four digits of your Social Security Number (xxxx) and Date of Birth (mm/dd/yyyy) as indicated and click Submit. You will be taken to the next sign-up page. 5. Create a citibuddies ID. This will be your citibuddies login ID and cannot be changed, so think of one that is secure and easy to remember. 6. Create a citibuddies password. You can change your password at any time. 7. Enter your Password Reset Question and Answer. This can be used at a later time if you forget your password. 8. Enter your e-mail address. You will receive e-mail notification when new information is available in [address] 9. Click Sign Up. You can now view and download portions of your medical record. 10. Click the Download Summary menu link to download a portable copy of your medical information. If you have questions, please visit the Frequently Asked Questions section of the [employer] website. Remember, [employer] is NOT to be used for urgent needs. For medical emergencies, dial 911. Now available from your iPhone and Android! Please provide this summary of care documentation to your next provider. Your primary care clinician is listed as [facility]. If you have any questions after today's visit, please call [phone].

## 2017-12-18 NOTE — ACP (ADVANCE CARE PLANNING)
Advance Care Planning (ACP) Provider Conversation Snapshot    Date of ACP Conversation: 12/18/17  Persons included in Conversation:  patient  Length of ACP Conversation in minutes:  <16 minutes (Non-Billable)    Authorized Decision Maker (if patient is incapable of making informed decisions): This person is: Other Legally Authorized Decision Maker (e.g. Next of Kin)          For Patients with Decision Making Capacity:   Values/Goals: Exploration of values, goals, and preferences if recovery is not expected, even with continued medical treatment in the event of:  Imminent death  Severe, permanent brain injury  . \"In these circumstances, what matters most to you? \"  Care focused more on comfort or quality of life.   Pasquale Interiano Outcomes / Follow-Up Plan:   Recommended completion of Advance Directive form after review of ACP materials and conversation with prospective healthcare agent

## 2017-12-18 NOTE — PROGRESS NOTES
This is a Subsequent Medicare Annual Wellness Exam (AWV) (Performed 12 months after IPPE or effective date of Medicare Part B enrollment)    I have reviewed the patient's medical history in detail and updated the computerized patient record. History     Past Medical History:   Diagnosis Date    [facility] fibrillation ([facility] [address]     BPH with obstruction/lower urinary tract symptoms     Colon polyps     Dr. Kirti Turner DDD (degenerative disc disease), lumbar 9/15    DM type 2 (diabetes mellitus, type 2) ([facility]     Hyperlipidemia     Hypertension     Internal hemorrhoid     Microalbuminuria     Nocturia     Sleep apnea     has CPAP - Dr. Leo Schuster Stasis dermatitis     Testicular pain, left     Tobacco abuse     Urinary frequency     Vitamin D deficiency       Past Surgical History:   Procedure Laterality Date    HX CATARACT REMOVAL      x2    HX COLONOSCOPY  01/13/2015    Dr. Betts Stands - repeat 1/20    HX CYST REMOVAL      Back      Current Outpatient Prescriptions   Medication Sig Dispense Refill    metoprolol tartrate (LOPRESSOR) 50 mg tablet TAKE ONE TABLET BY MOUTH TWICE DAILY (Patient taking differently: Take 50 mg by mouth daily. TAKE ONE TABLET BY MOUTH TWICE DAILY) 482 Tab 1    FOLIC ACID PO Take  by Mouth.  acetaminophen (TYLENOL) 500 mg tablet 500 mg.      amLODIPine (NORVASC) 10 mg tablet Take  by mouth daily.  losartan (COZAAR) 50 mg tablet Take 100 mg by mouth daily.  tamsulosin (FLOMAX) 0.4 mg capsule Take 1 Cap by mouth daily (after dinner). 90 Cap 3    insulin aspart (NOVOLOG FLEXPEN) 100 unit/mL inpn by SubCUTAneous route. 10 units subcutaneously 15 minutes before breakfast and 12 units 15 minutes before lunch and 12 units 15 minutes before dinner.  ZINC PO Take  by mouth. Take 1 tablet by mouth daily.  insulin detemir (LEVEMIR FLEXTOUCH) 100 unit/mL (3 mL) inpn Inject 30 units subcutaneously in the evening (Patient taking differently: 25 Units.  Inject 25 units subcutaneously in the evening) 15 mL 5    warfarin (COUMADIN) 3 mg tablet Take 1 Tab by mouth daily. (Patient taking differently: Take 3 mg by mouth daily. 4 tablets 4 days a week, 3 tablets 3 days a week) 10 Tab 0    therapeutic multivitamin (THERA) tablet Take 1 Tab by mouth daily.  Insulin Needles, Disposable, (NOVOFINE 32) 32 gauge x 1/4\" ndle USE AS DIRECTED WITH LEVEMIR DAILY 100 Pen Needle 11    Lancets misc Dispense Accu Check Multi-Clix Drum - use to check blood sugar bid or as directed - 90 day supply 100 Each 3    glucose blood VI test strips (BLOOD GLUCOSE TEST) strip Test strips of patient choice - use to check blood sugar bid or as directed 200 Strip 3    Blood-Glucose Meter (ACCU-CHEK BRY) misc by Does Not Apply route.  glucose blood VI test strips (ACCU-CHEK SMARTVIEW) strip by Does Not Apply route See Admin Instructions.  Cholecalciferol, Vitamin D3, (VITAMIN D3) 2,000 unit cap daily.        Allergies   Allergen Reactions    Iodinated Contrast- Oral And Iv Dye Other (comments)    Iodine Other (comments)    Vasotec [Enalapril Maleate] Other (comments)     Thought to have possibly caused his pancreatitis     Family History   Problem Relation Age of Onset   [address] [facility] Cancer Mother      esophageal    Diabetes Mother     Cancer Father      lung    Diabetes Maternal Grandmother      Social History   Substance Use Topics    Smoking status: Former Smoker     Packs/day: 1.00     Years: 42.00     Types: Cigarettes     Quit date: 9/22/2016    Smokeless tobacco: Never Used    Alcohol use Yes      Comment: occasional     Patient Active Problem List   Diagnosis Code    Hypertension I10    Hyperlipidemia E78.5    Vitamin D deficiency E55.9    Sleep apnea G47.30    Tobacco abuse Z72.0    Stasis dermatitis I87.2    Internal hemorrhoid K64.8    Microalbuminuria R80.9    Insulin dependent type 2 diabetes mellitus, uncontrolled ([facility] [address] E11.65, Z79.4    Testicular pain N50.819    DDD (degenerative disc disease), lumbar M51.36    Paroxysmal atrial fibrillation (HCC) I48.0    Acute diastolic CHF (congestive heart failure) (HCC) I50.31    Nocturia R35.1    Anemia D64.9       Depression Risk Factor Screening:     PHQ over the last two weeks 12/18/2017   Little interest or pleasure in doing things Not at all   Feeling down, depressed or hopeless Not at all   Total Score PHQ 2 0     Alcohol Risk Factor Screening: You do not drink alcohol or very rarely. Functional Ability and Level of Safety:   Hearing Loss  Hearing is good. But has trouble with wax. Activities of Daily Living  The home contains: no safety equipment. Patient does total self care    Fall RiskFall Risk Assessment, last 12 mths 12/18/2017   Able to walk? Yes   Fall in past 12 months? Yes   Fall with injury? No   Number of falls in past 12 months 2   Fall Risk Score 2   fell while walking in the dark. Abuse Screen  Patient is not abused    Cognitive Screening   Evaluation of Cognitive Function:  Has your family/caregiver stated any concerns about your memory: no  Normal    Patient Care Team   Patient Care Team:  Cammy Yi MD as PCP - General (Internal Medicine)  Nargis Hannon DPM (Podiatry)  Marni Laws MD (Gastroenterology)  Syl Anton MD (Sleep Medicine)  Jessica Chris MD (Ophthalmology)  Genie Tee MD (Hematology and Oncology)  Brianna Simmons MD (Cardiology)  Jd Mccracken MD (Endocrinology)    Assessment/Plan   Education and counseling provided:  Are appropriate based on today's review and evaluation  End-of-Life planning (with patient's consent)    Diagnoses and all orders for this visit:    1. Medicare annual wellness visit, subsequent    2. Screening for alcoholism  -     Annual  Alcohol Screen 15 min ()    3.  Screening for depression  -     Depression Screen Annual      Health Maintenance Due   Topic Date Due    FOOT EXAM Q1  10/14/2016    Influenza Age 5 to Adult 08/01/2017    HEMOGLOBIN A1C Q6M  09/27/2017    MEDICARE YEARLY EXAM  12/14/2017    EYE EXAM RETINAL OR DILATED Q1  12/15/2017    MICROALBUMIN Q1  12/21/2017

## 2018-04-09 ENCOUNTER — OFFICE VISIT (OUTPATIENT)
Dept: FAMILY MEDICINE CLINIC | Age: 68
End: 2018-04-09

## 2018-04-09 VITALS
RESPIRATION RATE: 17 BRPM | HEIGHT: 69 IN | BODY MASS INDEX: 38.66 KG/M2 | DIASTOLIC BLOOD PRESSURE: 61 MMHG | HEART RATE: 59 BPM | SYSTOLIC BLOOD PRESSURE: 117 MMHG | TEMPERATURE: 96.8 F | WEIGHT: 261 LBS

## 2018-04-09 DIAGNOSIS — I50.31 ACUTE DIASTOLIC CHF (CONGESTIVE HEART FAILURE) (HCC): ICD-10-CM

## 2018-04-09 DIAGNOSIS — Z87.891 FORMER SMOKER: ICD-10-CM

## 2018-04-09 DIAGNOSIS — I10 ESSENTIAL HYPERTENSION: ICD-10-CM

## 2018-04-09 DIAGNOSIS — E66.01 SEVERE OBESITY (BMI 35.0-39.9) WITH COMORBIDITY (HCC): Primary | ICD-10-CM

## 2018-04-09 DIAGNOSIS — I48.0 PAROXYSMAL ATRIAL FIBRILLATION (HCC): ICD-10-CM

## 2018-04-09 DIAGNOSIS — E55.9 VITAMIN D DEFICIENCY: ICD-10-CM

## 2018-04-09 DIAGNOSIS — E78.2 MIXED HYPERLIPIDEMIA: ICD-10-CM

## 2018-04-09 LAB
25(OH)D3 SERPL-MCNC: 48 NG/ML (ref 32–100)
A-G RATIO,AGRAT: 1 RATIO (ref 1.1–2.6)
ALBUMIN SERPL-MCNC: 3.9 G/DL (ref 3.5–5)
ALP SERPL-CCNC: 94 U/L (ref 40–125)
ALT SERPL-CCNC: 21 U/L (ref 5–40)
ANION GAP SERPL CALC-SCNC: 9.5 MMOL/L
AST SERPL W P-5'-P-CCNC: 20 U/L (ref 10–37)
BILIRUB SERPL-MCNC: 0.3 MG/DL (ref 0.2–1.2)
BUN SERPL-MCNC: 20 MG/DL (ref 6–22)
CALCIUM SERPL-MCNC: 8.8 MG/DL (ref 8.4–10.4)
CHLORIDE SERPL-SCNC: 103 MMOL/L (ref 98–110)
CHOLEST SERPL-MCNC: 161 MG/DL (ref 110–200)
CO2 SERPL-SCNC: 26 MMOL/L (ref 20–32)
CREAT SERPL-MCNC: 0.8 MG/DL (ref 0.8–1.6)
CREATININE, URINE: 103 MG/DL
CREATININE, URINE: 97 MG/DL
GFRAA, 66117: >60
GFRNA, 66118: >60
GLOBULIN,GLOB: 4.1 G/DL (ref 2–4)
GLUCOSE SERPL-MCNC: 201 MG/DL (ref 70–99)
HDLC SERPL-MCNC: 3.4 MG/DL (ref 0–5)
HDLC SERPL-MCNC: 47 MG/DL (ref 40–59)
LDLC SERPL CALC-MCNC: 94 MG/DL (ref 50–99)
MICROALB/CREAT RATIO, 140286: 437.9 MCG/MG OF CREATININE (ref 0–30)
MICROALBUMIN,URINE RANDOM 140054: 424.8 UG/ML (ref 0.1–17)
POTASSIUM SERPL-SCNC: 4.7 MMOL/L (ref 3.5–5.5)
PROT SERPL-MCNC: 8 G/DL (ref 6.2–8.1)
SODIUM SERPL-SCNC: 138 MMOL/L (ref 133–145)
TRIGL SERPL-MCNC: 99 MG/DL (ref 40–149)
VLDLC SERPL CALC-MCNC: 20 MG/DL (ref 8–30)

## 2018-04-09 RX ORDER — AMLODIPINE BESYLATE 5 MG/1
5 TABLET ORAL DAILY
Qty: 90 TAB | Refills: 1 | Status: SHIPPED | OUTPATIENT
Start: 2018-04-09 | End: 2019-01-04 | Stop reason: SDUPTHER

## 2018-04-09 RX ORDER — METOPROLOL SUCCINATE 50 MG/1
50 TABLET, EXTENDED RELEASE ORAL DAILY
Qty: 90 TAB | Refills: 1 | Status: SHIPPED | OUTPATIENT
Start: 2018-04-09 | End: 2018-08-09 | Stop reason: SDUPTHER

## 2018-04-09 NOTE — PROGRESS NOTES
1. Have you been to the ER, urgent care clinic since your last visit? No.Hospitalized since your last visit? No.    2. Have you seen or consulted any other health care providers outside of the [address] [place] [address] since your last visit? Include any pap smears or colon screening. Yes, saw his urologist in 10/10/2017.      Chief Complaint   Patient presents with    Follow Up Chronic Condition    Diabetes    Irregular Heart Beat    CHF

## 2018-04-09 NOTE — MR AVS SNAPSHOT
Mejia Rendon Lima [address] [place] [address] [phone] [id number] 
[phone] Patient: Shell Nagy MRN: [id number] Visit Information Date & Time Provider Department Dept. Phone Encounter #  
 4/9/2018 10:45 AM Miriam Montalvo, [address] [place] [address] [phone] [id number] Follow-up Instructions Return in about 4 months (around 8/9/2018) for 30 minute slot. Your Appointments 6/11/2018 10:15 AM  
ESTABLISHED PATIENT with Renea Serrano MD  
[facility] [address] [facility]) Appt Note: 8 MONTH F/U WITH PVR  
 [address] 2 [facility] [id number]  
  
    
 12/20/2018  3:00 PM  
Office Visit with Miriam Montalvo MD  
[address]) Appt Note: 295 [facility] 68 [facility] [address] 500 [facility]  
  
   
 [address]  [address] 95 Upcoming Health Maintenance Date Due  
 FOOT EXAM Q1 10/14/2016 EYE EXAM RETINAL OR DILATED Q1 12/15/2017 MICROALBUMIN Q1 12/21/2017 LIPID PANEL Q1 3/27/2018 HEMOGLOBIN A1C Q6M 9/1/2018 Prostate-Specific Antigen 10/10/2018 GLAUCOMA SCREENING Q2Y 12/15/2018 Pneumococcal 65+ Low/Medium Risk (2 of 2 - PPSV23) 12/18/2018 MEDICARE YEARLY EXAM 12/19/2018 COLONOSCOPY 1/13/2020 DTaP/Tdap/Td series (2 - Td) 12/18/2027 Allergies as of 4/9/2018  Review Complete On: 4/9/2018 By: Miriam Montalvo MD  
  
 Severity Noted Reaction Type Reactions Iodinated Contrast- Oral And Iv Dye  09/13/2016    Other (comments) Iodine  07/27/2015    Other (comments) Vasotec [Enalapril Maleate]  12/16/2016    Other (comments) Thought to have possibly caused his pancreatitis Current Immunizations  Reviewed on 12/18/2017 Name Date Influenza High Dose Vaccine PF 12/18/2017, 10/22/2015 Influenza Vaccine 12/1/2014 Zoster Vaccine, Live 1/1/2015 Not reviewed this visit You Were Diagnosed With   
  
 Codes Comments Severe obesity (BMI 35.0-39. 9) with comorbidity ([facility].)    -  Primary ICD-10-CM: E66.01 
ICD-9-CM: 278.01 Acute diastolic CHF (congestive heart failure) (HCC)     ICD-10-CM: I50.31 ICD-9-CM: 428.31, 428.0 Paroxysmal atrial fibrillation (HCC)     ICD-10-CM: I48.0 ICD-9-CM: 427.31 Insulin dependent type 2 diabetes mellitus, uncontrolled ([facility] 75.)     ICD-10-CM: E11.65, Z79.4 ICD-9-CM: 250.02, V58.67 Essential hypertension     ICD-10-CM: I10 
ICD-9-CM: 401.9 Vitamin D deficiency     ICD-10-CM: E55.9 ICD-9-CM: 268.9 Mixed hyperlipidemia     ICD-10-CM: E78.2 ICD-9-CM: 272.2 Former smoker     ICD-10-CM: B21.248 ICD-9-CM: V15.82 Vitals BP Pulse Temp Resp Height(growth percentile) Weight(growth percentile)  
 117/61 (!) 59 96.8 °F (36 °C) (Oral) 17 5' 9\" (1.753 m) 261 lb (118.4 kg) BMI Smoking Status 38.54 kg/m2 Former Smoker Vitals History BMI and BSA Data Body Mass Index Body Surface Area 38.54 kg/m 2 2.4 m 2 Preferred Pharmacy Pharmacy Name Phone [address], [address] [place] [address] [phone] Your Updated Medication List  
  
   
This list is accurate as of 4/9/18 11:00 AM.  Always use your most recent med list.  
  
  
  
  
 Wing Model Generic drug:  Blood-Glucose Meter  
by Does Not Apply route. * ACCU-CHEK SMARTVIEW TEST STRIP strip Generic drug:  glucose blood VI test strips  
by Does Not Apply route See Admin Instructions. * glucose blood VI test strips strip Commonly known as:  blood glucose test  
Test strips of patient choice - use to check blood sugar bid or as directed  
  
 acetaminophen 500 mg tablet Commonly known as:  TYLENOL  
500 mg. amLODIPine 10 mg tablet Commonly known as:  Skip Newcomer Take 5 mg by mouth. FOLIC ACID PO Take  by Mouth. insulin detemir U-100 100 unit/mL (3 mL) Inpn Commonly known as:  Nazario Jacques Inject 30 units subcutaneously in the evening Insulin Needles (Disposable) 32 gauge x 1/4\" Ndle Commonly known as:  NOVOFINE 32 USE AS DIRECTED WITH LEVEMIR DAILY Lancets [facility] Dispense Accu Check Multi-Clix Drum - use to check blood sugar bid or as directed - 90 day supply  
  
 losartan 50 mg tablet Commonly known as:  COZAAR Take 100 mg by mouth daily. metoprolol tartrate 50 mg tablet Commonly known as:  LOPRESSOR  
TAKE ONE TABLET BY MOUTH TWICE DAILY NovoLOG Flexpen U-100 Insulin 100 unit/mL Inpn Generic drug:  insulin aspart U-100  
by SubCUTAneous route. 10 units subcutaneously 15 minutes before breakfast and 12 units 15 minutes before lunch and 14 units 15 minutes before dinner. tamsulosin 0.4 mg capsule Commonly known as:  FLOMAX Take 1 Cap by mouth daily (after dinner). THERA tablet Generic drug:  therapeutic multivitamin Take 1 Tab by mouth daily. VITAMIN D3 2,000 unit Cap capsule Generic drug:  Cholecalciferol (Vitamin D3)  
daily. warfarin 3 mg tablet Commonly known as:  COUMADIN Take 1 Tab by mouth daily. ZINC PO Take  by mouth. Take 1 tablet by mouth daily. * Notice: This list has 2 medication(s) that are the same as other medications prescribed for you. Read the directions carefully, and ask your doctor or other care provider to review them with you. Follow-up Instructions Return in about 4 months (around 8/9/2018) for 30 minute slot. To-Do List   
 04/09/2018 Lab:  LIPID PANEL   
  
 04/09/2018 Lab:  METABOLIC PANEL, COMPREHENSIVE   
  
 04/09/2018 Lab:  MICROALBUMIN, UR, RAND W/ MICROALB/CREAT RATIO   
  
 04/09/2018 Lab:  VITAMIN D, 25 HYDROXY Patient Instructions Body Mass Index: Care Instructions Your Care Instructions Body mass index (BMI) can help you see if your weight is raising your risk for health problems. It uses a formula to compare how much you weigh with how tall you are. · A BMI lower than 18.5 is considered underweight. · A BMI between 18.5 and 24.9 is considered healthy. · A BMI between 25 and 29.9 is considered overweight. A BMI of 30 or higher is considered obese. If your BMI is in the normal range, it means that you have a lower risk for weight-related health problems. If your BMI is in the overweight or obese range, you may be at increased risk for weight-related health problems, such as high blood pressure, heart disease, stroke, arthritis or joint pain, and diabetes. If your BMI is in the underweight range, you may be at increased risk for health problems such as fatigue, lower protection (immunity) against illness, muscle loss, bone loss, hair loss, and hormone problems. BMI is just one measure of your risk for weight-related health problems. You may be at higher risk for health problems if you are not active, you eat an unhealthy diet, or you drink too much alcohol or use tobacco products. Follow-up care is a key part of your treatment and safety. Be sure to make and go to all appointments, and call your doctor if you are having problems. It's also a good idea to know your test results and keep a list of the medicines you take. How can you care for yourself at home? · Practice healthy eating habits. This includes eating plenty of fruits, vegetables, whole grains, lean protein, and low-fat dairy. · If your doctor recommends it, get more exercise. Walking is a good choice. Bit by bit, increase the amount you walk every day. Try for at least 30 minutes on most days of the week. · Do not smoke. Smoking can increase your risk for health problems.  If you need help quitting, talk to your doctor about stop-smoking programs and medicines. These can increase your chances of quitting for good. · Limit alcohol to 2 drinks a day for men and 1 drink a day for women. Too much alcohol can cause health problems. If you have a BMI higher than 25 · Your doctor may do other tests to check your risk for weight-related health problems. This may include measuring the distance around your waist. A waist measurement of more than 40 inches in men or 35 inches in women can increase the risk of weight-related health problems. · Talk with your doctor about steps you can take to stay healthy or improve your health. You may need to make lifestyle changes to lose weight and stay healthy, such as changing your diet and getting regular exercise. If you have a BMI lower than 18.5 · Your doctor may do other tests to check your risk for health problems. · Talk with your doctor about steps you can take to stay healthy or improve your health. You may need to make lifestyle changes to gain or maintain weight and stay healthy, such as getting more healthy foods in your diet and doing exercises to build muscle. Where can you learn more? Go to http://tayler-joan.info/. Enter S176 in the search box to learn more about \"Body Mass Index: Care Instructions. \" Current as of: October 13, 2016 Content Version: 11.4 © 3051-0206 Healthwise, Incorporated. Care instructions adapted under license by [employer] (which disclaims liability or warranty for this information). If you have questions about a medical condition or this instruction, always ask your healthcare professional. [place] [address] any warranty or liability for your use of this information. Introducing [facility]! [employer] introduces Gini patient portal. Now you can access parts of your medical record, email your doctor's office, and request medication refills online.    
 
1. In your internet browser, go to [email or web address] 2. Click on the First Time User? Click Here link in the Sign In box. You will see the New Member Sign Up page. 3. Enter your Pixable Access Code exactly as it appears below. You will not need to use this code after youve completed the sign-up process. If you do not sign up before the expiration date, you must request a new code. · Pixable Access Code: [email or web address] Expires: 7/8/2018 10:59 AM 
 
4. Enter the last four digits of your Social Security Number (xxxx) and Date of Birth (mm/dd/yyyy) as indicated and click Submit. You will be taken to the next sign-up page. 5. Create a Shiput ID. This will be your Pixable login ID and cannot be changed, so think of one that is secure and easy to remember. 6. Create a Pixable password. You can change your password at any time. 7. Enter your Password Reset Question and Answer. This can be used at a later time if you forget your password. 8. Enter your e-mail address. You will receive e-mail notification when new information is available in [address] 9. Click Sign Up. You can now view and download portions of your medical record. 10. Click the Download Summary menu link to download a portable copy of your medical information. If you have questions, please visit the Frequently Asked Questions section of the Pixable website. Remember, Pixable is NOT to be used for urgent needs. For medical emergencies, dial 911. Now available from your iPhone and Android! Please provide this summary of care documentation to your next provider. Your primary care clinician is listed as [facility]. If you have any questions after today's visit, please call [phone].

## 2018-04-09 NOTE — PROGRESS NOTES
Lulu Hinds is a 79 y.o. male and presents with Follow Up Chronic Condition; Diabetes; Irregular Heart Beat; and CHF       Subjective:    Diabetes- not controlled- A1c 8.7% - following with endocrinology. Diastolic CHF and paroxysmal AFib-no sx. No sob, no cp. Obesity- gained 10 lbs since last visit.   htn- no cp or sob. Vit D deficiency - no bone pain. Assessment/Plan:    Diabetes- seeing [facility] this Friday. Encouraged to bring log book to them. Obesity- goal of 1 lb/week wt loss. If does not see dietician through endo, will order this next visit. Diastolic CHF- no sx. Continue bp control.   htn- bp controlled  Vit D def in past- recheck this. RTC in 3 months. No orders of the defined types were placed in this encounter. Diagnoses and all orders for this visit:    1. Severe obesity (BMI 35.0-39. 9) with comorbidity ([facility]    2. Acute diastolic CHF (congestive heart failure) (HCC)    3. Paroxysmal atrial fibrillation ([facility]    4. Insulin dependent type 2 diabetes mellitus, uncontrolled (HCC)  -     MICROALBUMIN, UR, RAND W/ MICROALB/CREAT RATIO; Future    5. Essential hypertension    6. Vitamin D deficiency  -     VITAMIN D, 25 HYDROXY; Future    7. Mixed hyperlipidemia  -     METABOLIC PANEL, COMPREHENSIVE; Future  -     LIPID PANEL; Future    8. Former smoker    Other orders  -     amLODIPine (NORVASC) 5 mg tablet; Take 1 Tab by mouth daily. -     metoprolol succinate (TOPROL-XL) 50 mg XL tablet; Take 1 Tab by mouth daily.  -     LIPID PANEL  -     VITAMIN D, 25 HYDROXY  -     METABOLIC PANEL, COMPREHENSIVE  -     MICROALBUMIN, UR, RAND  -     CREATININE, UR, RANDOM            ROS:  Negative except as mentioned above  Cardiac- no chest pain or palpitations  Pulmonary- no sob or wheezes  GI- no n/v or diarrhea.       SH:  Social History   Substance Use Topics    Smoking status: Former Smoker     Packs/day: 1.00     Years: 42.00     Types: Cigarettes     Quit date: 9/22/2016    Smokeless tobacco: Never Used    Alcohol use Yes      Comment: occasional         Medications/Allergies:  Current Outpatient Prescriptions on File Prior to Visit   Medication Sig Dispense Refill    metoprolol tartrate (LOPRESSOR) 50 mg tablet TAKE ONE TABLET BY MOUTH TWICE DAILY (Patient taking differently: Take 50 mg by mouth daily. TAKE ONE TABLET BY MOUTH TWICE DAILY) 132 Tab 1    FOLIC ACID PO Take  by Mouth.  acetaminophen (TYLENOL) 500 mg tablet 500 mg.      amLODIPine (NORVASC) 10 mg tablet Take 5 mg by mouth.  losartan (COZAAR) 50 mg tablet Take 100 mg by mouth daily.  tamsulosin (FLOMAX) 0.4 mg capsule Take 1 Cap by mouth daily (after dinner). 90 Cap 3    insulin aspart (NOVOLOG FLEXPEN) 100 unit/mL inpn by SubCUTAneous route. 10 units subcutaneously 15 minutes before breakfast and 12 units 15 minutes before lunch and 14 units 15 minutes before dinner.  ZINC PO Take  by mouth. Take 1 tablet by mouth daily.  insulin detemir (LEVEMIR FLEXTOUCH) 100 unit/mL (3 mL) inpn Inject 30 units subcutaneously in the evening (Patient taking differently: 30 Units nightly. Inject 30units subcutaneously in the evening) 15 mL 5    warfarin (COUMADIN) 3 mg tablet Take 1 Tab by mouth daily. (Patient taking differently: Take 3 mg by mouth daily. 4 tablets 2 days a week, 3 tablets 5 days a week) 10 Tab 0    therapeutic multivitamin (THERA) tablet Take 1 Tab by mouth daily.  Insulin Needles, Disposable, (NOVOFINE 32) 32 gauge x 1/4\" ndle USE AS DIRECTED WITH LEVEMIR DAILY 100 Pen Needle 11    Lancets misc Dispense Accu Check Multi-Clix Drum - use to check blood sugar bid or as directed - 90 day supply 100 Each 3    glucose blood VI test strips (BLOOD GLUCOSE TEST) strip Test strips of patient choice - use to check blood sugar bid or as directed 200 Strip 3    Blood-Glucose Meter (ACCU-CHEK BRY) misc by Does Not Apply route.       glucose blood VI test strips (ACCU-CHEK SMARTVIEW) strip by Does Not Apply route See Admin Instructions.  Cholecalciferol, Vitamin D3, (VITAMIN D3) 2,000 unit cap daily. No current facility-administered medications on file prior to visit. Allergies   Allergen Reactions    Iodinated Contrast- Oral And Iv Dye Other (comments)    Iodine Other (comments)    Vasotec [Enalapril Maleate] Other (comments)     Thought to have possibly caused his pancreatitis       Objective:  Visit Vitals    /61    Pulse (!) 59    Temp 96.8 °F (36 °C) (Oral)    Resp 17    Ht 5' 9\" (1.753 m)    Wt 261 lb (118.4 kg)    BMI 38.54 kg/m2    Body mass index is 38.54 kg/(m^2). Constitutional: Well developed, nourished, no distress, alert   CV: S1, S2.  RRR. No murmurs/rubs. No thrills palpated. No carotid bruits. Intact distal pulses. No edema. Pulm: No abnormalities on inspection. Clear to auscultation bilaterally. No wheezing/rhonchi. Normal effort. GI: Soft, nontender, nondistended. Normal active bowel sounds. No  masses on palpation. No hepatosplenomegaly.

## 2018-04-09 NOTE — PATIENT INSTRUCTIONS
Body Mass Index: Care Instructions  Your Care Instructions    Body mass index (BMI) can help you see if your weight is raising your risk for health problems. It uses a formula to compare how much you weigh with how tall you are. · A BMI lower than 18.5 is considered underweight. · A BMI between 18.5 and 24.9 is considered healthy. · A BMI between 25 and 29.9 is considered overweight. A BMI of 30 or higher is considered obese. If your BMI is in the normal range, it means that you have a lower risk for weight-related health problems. If your BMI is in the overweight or obese range, you may be at increased risk for weight-related health problems, such as high blood pressure, heart disease, stroke, arthritis or joint pain, and diabetes. If your BMI is in the underweight range, you may be at increased risk for health problems such as fatigue, lower protection (immunity) against illness, muscle loss, bone loss, hair loss, and hormone problems. BMI is just one measure of your risk for weight-related health problems. You may be at higher risk for health problems if you are not active, you eat an unhealthy diet, or you drink too much alcohol or use tobacco products. Follow-up care is a key part of your treatment and safety. Be sure to make and go to all appointments, and call your doctor if you are having problems. It's also a good idea to know your test results and keep a list of the medicines you take. How can you care for yourself at home? · Practice healthy eating habits. This includes eating plenty of fruits, vegetables, whole grains, lean protein, and low-fat dairy. · If your doctor recommends it, get more exercise. Walking is a good choice. Bit by bit, increase the amount you walk every day. Try for at least 30 minutes on most days of the week. · Do not smoke. Smoking can increase your risk for health problems. If you need help quitting, talk to your doctor about stop-smoking programs and medicines. These can increase your chances of quitting for good. · Limit alcohol to 2 drinks a day for men and 1 drink a day for women. Too much alcohol can cause health problems. If you have a BMI higher than 25  · Your doctor may do other tests to check your risk for weight-related health problems. This may include measuring the distance around your waist. A waist measurement of more than 40 inches in men or 35 inches in women can increase the risk of weight-related health problems. · Talk with your doctor about steps you can take to stay healthy or improve your health. You may need to make lifestyle changes to lose weight and stay healthy, such as changing your diet and getting regular exercise. If you have a BMI lower than 18.5  · Your doctor may do other tests to check your risk for health problems. · Talk with your doctor about steps you can take to stay healthy or improve your health. You may need to make lifestyle changes to gain or maintain weight and stay healthy, such as getting more healthy foods in your diet and doing exercises to build muscle. Where can you learn more? Go to http://tayler-joan.info/. Enter S176 in the search box to learn more about \"Body Mass Index: Care Instructions. \"  Current as of: October 13, 2016  Content Version: 11.4  © 8822-9811 Healthwise, Incorporated. Care instructions adapted under license by [employer] (which disclaims liability or warranty for this information). If you have questions about a medical condition or this instruction, always ask your healthcare professional. [address] any warranty or liability for your use of this information.

## 2018-05-03 DIAGNOSIS — E55.9 VITAMIN D DEFICIENCY: ICD-10-CM

## 2018-05-03 DIAGNOSIS — E78.2 MIXED HYPERLIPIDEMIA: ICD-10-CM

## 2018-05-04 ENCOUNTER — OFFICE VISIT (OUTPATIENT)
Dept: FAMILY MEDICINE CLINIC | Age: 68
End: 2018-05-04

## 2018-05-04 VITALS
HEART RATE: 55 BPM | RESPIRATION RATE: 17 BRPM | BODY MASS INDEX: 37.98 KG/M2 | SYSTOLIC BLOOD PRESSURE: 105 MMHG | OXYGEN SATURATION: 98 % | WEIGHT: 256.4 LBS | TEMPERATURE: 95.4 F | HEIGHT: 69 IN | DIASTOLIC BLOOD PRESSURE: 81 MMHG

## 2018-05-04 DIAGNOSIS — K86.3 PANCREATIC PSEUDOCYST: ICD-10-CM

## 2018-05-04 DIAGNOSIS — K85.00 IDIOPATHIC ACUTE PANCREATITIS, UNSPECIFIED COMPLICATION STATUS: Primary | ICD-10-CM

## 2018-05-04 PROBLEM — Z87.19 HISTORY OF ACUTE PANCREATITIS: Status: ACTIVE | Noted: 2018-05-04

## 2018-05-04 RX ORDER — UBIDECARENONE 50 MG
CAPSULE ORAL
COMMUNITY
End: 2020-05-05

## 2018-05-04 NOTE — PROGRESS NOTES
Chief Complaint   Patient presents with    Abdominal Pain     1. Have you been to the ER, urgent care clinic since your last visit? Hospitalized since your last visit? Yes When: 5/2/2018 Where: [facility] Reason for visit: abdominal pain    2. Have you seen or consulted any other health care providers outside of the [address] [place] [address] since your last visit? Include any pap smears or colon screening.  No

## 2018-05-04 NOTE — PATIENT INSTRUCTIONS
Pancreatitis: Care Instructions  Your Care Instructions    The pancreas is an organ behind the stomach. It makes hormones and enzymes to help your body digest food. But if these enzymes attack the pancreas, it can get inflamed. This is called pancreatitis. Most cases are caused by gallstones or by heavy alcohol use. If you take care of yourself at home, it will help you get better. It will also help you avoid more problems with your pancreas. Follow-up care is a key part of your treatment and safety. Be sure to make and go to all appointments, and call your doctor if you are having problems. It's also a good idea to know your test results and keep a list of the medicines you take. How can you care for yourself at home? · Drink clear liquids and eat bland foods until you feel better. [place] foods include rice, dry toast, and crackers. They also include bananas and applesauce. · Eat a low-fat diet until your doctor says your pancreas is healed. · Do not drink alcohol. Tell your doctor if you need help to quit. Counseling, support groups, and sometimes medicines can help you stay sober. · Be safe with medicines. Read and follow all instructions on the label. ¨ If the doctor gave you a prescription medicine for pain, take it as prescribed. ¨ If you are not taking a prescription pain medicine, ask your doctor if you can take an over-the-counter medicine. · If your doctor prescribed antibiotics, take them as directed. Do not stop taking them just because you feel better. You need to take the full course of antibiotics. · Get extra rest until you feel better. To prevent future problems with your pancreas  · Do not drink alcohol. · Tell your doctors and pharmacist that you've had pancreatitis. They can help you avoid medicines that may cause this problem again. When should you call for help? Call 911 anytime you think you may need emergency care.  For example, call if:  ? · You vomit blood or what looks like coffee grounds. ? · Your stools are maroon or very bloody. ?Call your doctor now or seek immediate medical care if:  ? · You have new or worse belly pain. ? · Your stools are black and look like tar, or they have streaks of blood. ? · You are vomiting. ? Watch closely for changes in your health, and be sure to contact your doctor if:  ? · You do not get better as expected. Where can you learn more? Go to http://tayler-joan.info/. Enter E465 in the search box to learn more about \"Pancreatitis: Care Instructions. \"  Current as of: May 12, 2017  Content Version: 11.4  © 8620-4452 [employer]. Care instructions adapted under license by [employer] (which disclaims liability or warranty for this information). If you have questions about a medical condition or this instruction, always ask your healthcare professional. [address] any warranty or liability for your use of this information. Learning About Acute Pancreatitis  What is acute pancreatitis? The pancreas is an organ behind the stomach. It makes hormones like insulin that help control how your body uses sugar. It also makes enzymes that help you digest food. Usually these enzymes flow from the pancreas to the intestines. But if they leak into the pancreas, they can irritate it and cause pain and swelling. When this happens suddenly, it's called acute pancreatitis. What causes it? Most of the time, acute pancreatitis is caused by gallstones or by heavy alcohol use. But several other things can cause it, such as:  · High levels of fats in the blood. · An injury. · A problem after a surgery or a procedure. · Certain medicines. What are the symptoms? The main symptom is pain in the upper belly. The pain can be severe. You also may have a fever, nausea, or vomiting. Some people get so sick that they have problems breathing. They also may have low blood pressure.   How is it diagnosed? Your doctor will diagnose pancreatitis with an exam and by looking at your lab tests. Your doctor may think that you have this problem based on your symptoms and where you have pain in your belly. You may have blood tests of enzymes called amylase and lipase. In pancreatitis, the level of these enzymes is usually much higher than normal.  You also may have imaging tests of the belly. These may include an ultrasound, a CT scan, or an MRI. A special MRI called MRCP can show images of the bile ducts. This test can be very helpful when gallstones are causing the problem. How is it treated? Treatment of acute pancreatitis usually takes place in the hospital. It focuses on taking care of pain and supporting your body while your pancreas heals. In severe cases, treatment may occur in an intensive care unit to support breathing, treat serious infections, or help raise very low blood pressure. If a gallstone is causing the problem, the gallstone may need to be removed. This is done during a procedure called ERCP. The doctor puts a scope in your mouth and moves it gently through the stomach and into the ducts from the pancreas and gallbladder. The doctor is then able to see a stone and remove it. Sometimes the gallbladder, which makes gallstones, needs to be removed by surgery. People with pancreatitis often need a lot of fluid to help support their other organs and their blood pressure. They get fluids through a vein (intravenous, or IV). Instead of food by mouth, nutrition is sometimes given through a vein while the pancreas heals. Follow-up care is a key part of your treatment and safety. Be sure to make and go to all appointments, and call your doctor if you are having problems. It's also a good idea to know your test results and keep a list of the medicines you take. Where can you learn more? Go to http://tayler-joan.info/.   Enter X762 in the search box to learn more about \"Learning About Acute Pancreatitis. \"  Current as of: May 12, 2017  Content Version: 11.4  © 6819-3741 [employer], [employer]. Care instructions adapted under license by [employer] (which disclaims liability or warranty for this information). If you have questions about a medical condition or this instruction, always ask your healthcare professional. [place] [address] any warranty or liability for your use of this information.

## 2018-05-04 NOTE — PROGRESS NOTES
Jesenia Mckee is a 79 y.o. male and presents with Abdominal Pain       Subjective:    Pancreatitis- went to ER- Lipase was 224. Stomach \"unsettled\" today. Sl nausea, no V. Sl diarrhea also. No EtoH, no gallstones, no meds which should cause this (metformin and vasotec stopped in past due to this), normal triglycerides, no apparent infx, no other etiology clear. Reviewed last CT abd with pt as below  CT CHEST/ABD/PELVIS W/O [id number]/14/2016  [facility]  Result Impression   IMPRESSION:    1.  Decreased size of pancreatic tail likely infected pseudocyst or walled off necrosis.  Similar to improved maria de jesus-splenic phlegmonous tissue. 2.  Anemia. 3.  Mild emphysema and bronchitis. 4.  Decreased trace left pleural effusion. 5.  Unchanged Likely hemorrhagic/proteinaceous renal cysts bilaterally. 6.  Mild ectasia infrarenal aorta up to 2.7 cm.  7.  Decreased hyperdense right upper back skin nodule, possibly an improving from sebaceous cyst.     [address]  Component Name Value Ref Range   Potassium 4.4 3.5 - 5.5 mmol/L   SODIUM 137 133 - 145 mmol/L   CHLORIDE 102 98 - 110 mmol/L   Glucose 140 (H) 70 - 99 mg/dL   CALCIUM 8.9 8.4 - 10.4 mg/dL   ALBUMIN 3.4 (L) 3.5 - 5.0 g/dL   SGPT (ALT) 20 5 - 40 U/L   SGOT (AST) 24 10 - 37 U/L   BILIRUBIN TOTAL 0.2 0.2 - 1.2 mg/dL   ALKALINE PHOSPHATASE 82 40 - 125 U/L   BUN 19 6 - 22 mg/dL   CO2 26 20 - 32 mmol/L   CREATININE 0.8 0.8 - 1.6 mg/dL   eGFR African American >60.0 >60.0    eGFR Non African American >60.0 >60.0    GLOBULIN SERUM 4.0 2.0 - 4.0 g/dL   A/G RATIO 0.9 (L) 1.1 - 2.6 ratio    TOTAL PROTEIN 7.4 6.2 - 8.1 g/dL   ANION GAP 9.0 mmol/L       Assessment/Plan:      Pancreatitis- etiology unclear- no EtOH or other clear cause on previous evaluation- will need CT abd and referral to GI given previous pseudocyst- may need MRCP or ERCP. Labs also today. Sees Dr. Tyesha Ho. Discussed with pt. RTC in 1- 2 weeks. Orders Placed This Encounter    CT ABD WO CONT     Standing Status:   Future     Standing Expiration Date:   6/4/2019     Scheduling Instructions:      [facility]     Order Specific Question:   Is Patient Allergic to Contrast Dye? Answer:   Yes     Order Specific Question:   Type of contrast.  PLEASE NOTE: IV contrast is NOT utilized with this order. Answer:   Oral    METABOLIC PANEL, COMPREHENSIVE     Standing Status:   Future     Standing Expiration Date:   5/5/2019    CBC WITH AUTOMATED DIFF     Standing Status:   Future     Standing Expiration Date:   5/5/2019    LIPASE     Standing Status:   Future     Standing Expiration Date:   5/5/2019    REFERRAL TO GASTROENTEROLOGY     Referral Priority:   Routine     Referral Type:   Consultation     Referral Reason:   Specialty Services Required    red yeast rice 600 mg tab     Sig: Take  by Mouth Once a Day. Diagnoses and all orders for this visit:    1. Idiopathic acute pancreatitis, unspecified complication status- seems to be improving. Will recheck labs. Given previous pancreatitis and pseudocyst on CT from 2016 will recheck imaging as well. Patient also encouraged to discuss this with gastroenterology as there does not appear to be any clear etiology for this on his history. Highly unlikely due to metformin as he has been off this since a previous episode in 2016. He will go to the emergency room for any new or worsening symptoms.  -     REFERRAL TO GASTROENTEROLOGY  -     CT ABD WO CONT; Future  -     METABOLIC PANEL, COMPREHENSIVE; Future  -     CBC WITH AUTOMATED DIFF; Future  -     LIPASE; Future    2. Pancreatic pseudocyst  Comments:  on CT 12/2016            ROS:  Negative except as mentioned above  Cardiac- no chest pain or palpitations  Pulmonary- no sob or wheezes  GI- no n/v or diarrhea.       SH:  Social History   Substance Use Topics    Smoking status: Former Smoker     Packs/day: 1.00     Years: 42.00     Types: Cigarettes     Quit date: 9/22/2016    Smokeless tobacco: Never Used    Alcohol use Yes      Comment: occasional         Medications/Allergies:  Current Outpatient Prescriptions on File Prior to Visit   Medication Sig Dispense Refill    amLODIPine (NORVASC) 5 mg tablet Take 1 Tab by mouth daily. 90 Tab 1    metoprolol succinate (TOPROL-XL) 50 mg XL tablet Take 1 Tab by mouth daily. 90 Tab 1    FOLIC ACID PO Take  by Mouth.  acetaminophen (TYLENOL) 500 mg tablet 500 mg.      losartan (COZAAR) 50 mg tablet Take 100 mg by mouth daily.  tamsulosin (FLOMAX) 0.4 mg capsule Take 1 Cap by mouth daily (after dinner). 90 Cap 3    insulin aspart (NOVOLOG FLEXPEN) 100 unit/mL inpn by SubCUTAneous route. 10 units subcutaneously 15 minutes before breakfast and 12 units 15 minutes before lunch and 14 units 15 minutes before dinner.  ZINC PO Take  by mouth. Take 1 tablet by mouth daily.  insulin detemir (LEVEMIR FLEXTOUCH) 100 unit/mL (3 mL) inpn Inject 30 units subcutaneously in the evening (Patient taking differently: 30 Units nightly. Inject 30units subcutaneously in the evening) 15 mL 5    warfarin (COUMADIN) 3 mg tablet Take 1 Tab by mouth daily. (Patient taking differently: Take 3 mg by mouth daily. 4 tablets 2 days a week, 3 tablets 5 days a week) 10 Tab 0    therapeutic multivitamin (THERA) tablet Take 1 Tab by mouth daily.  Insulin Needles, Disposable, (NOVOFINE 32) 32 gauge x 1/4\" ndle USE AS DIRECTED WITH LEVEMIR DAILY 100 Pen Needle 11    Lancets misc Dispense Accu Check Multi-Clix Drum - use to check blood sugar bid or as directed - 90 day supply 100 Each 3    glucose blood VI test strips (BLOOD GLUCOSE TEST) strip Test strips of patient choice - use to check blood sugar bid or as directed 200 Strip 3    Blood-Glucose Meter (ACCU-CHEK BRY) misc by Does Not Apply route.  glucose blood VI test strips (ACCU-CHEK SMARTVIEW) strip by Does Not Apply route See Admin Instructions.       Cholecalciferol, Vitamin D3, (VITAMIN D3) 2,000 unit cap daily.  metoprolol tartrate (LOPRESSOR) 50 mg tablet TAKE ONE TABLET BY MOUTH TWICE DAILY (Patient taking differently: Take 50 mg by mouth daily. TAKE ONE TABLET BY MOUTH TWICE DAILY) 180 Tab 1     No current facility-administered medications on file prior to visit. Allergies   Allergen Reactions    Iodinated Contrast- Oral And Iv Dye Other (comments)    Iodine Other (comments)    Vasotec [Enalapril Maleate] Other (comments)     Thought to have possibly caused his pancreatitis       Objective:  Visit Vitals    /81 (BP 1 Location: Left arm, BP Patient Position: Sitting)    Pulse (!) 55    Temp 95.4 °F (35.2 °C) (Oral)    Resp 17    Ht 5' 9\" (1.753 m)    Wt 256 lb 6.4 oz (116.3 kg)    SpO2 98%    BMI 37.86 kg/m2    Body mass index is 37.86 kg/(m^2). Constitutional: Well developed, nourished, no distress, alert   CV: S1, S2.  RRR. No murmurs/rubs. No edema. Pulm: No abnormalities on inspection. Clear to auscultation bilaterally. GI: Soft, nontender, nondistended. Normal active bowel sounds. No  masses on palpation. No hepatosplenomegaly.

## 2018-05-04 NOTE — MR AVS SNAPSHOT
Mejia Michael [address] [facility] [address]. [address] [phone] 
[phone] Patient: Zoya Gonzalez MRN: [id number] BLI:[id number] Visit Information Date & Time Provider Department Dept. Phone Encounter #  
 5/4/2018  7:45 AM Leonardo Michael, [address] [place] [address] [phone] [id number] Your Appointments 6/11/2018 10:15 AM  
ESTABLISHED PATIENT with Caty Guerrero MD  
[facility]. [place] [address] [place] [address]) Appt Note: 8 MONTH F/U WITH PVR  
 [address] [facility] [address] [facility] 68 [id number]  
  
    
 8/9/2018  9:30 AM  
Follow Up with Leonardo Michael MD  
[address]) Appt Note: 4 mo f/u  
 [facility] [address]. [address]  
  
   
 [address]  
  
    
 12/20/2018  3:00 PM  
Office Visit with Loenardo Michael MD  
[address]) Appt Note: 295 [facility] 68 [facility] [address]. [address]  
  
   
 [address] Upcoming Health Maintenance Date Due  
 FOOT EXAM Q1 10/14/2016 EYE EXAM RETINAL OR DILATED Q1 12/15/2017 Influenza Age 5 to Adult 8/1/2018 HEMOGLOBIN A1C Q6M 9/1/2018 Prostate-Specific Antigen 10/10/2018 GLAUCOMA SCREENING Q2Y 12/15/2018 Pneumococcal 65+ Low/Medium Risk (2 of 2 - PPSV23) 12/18/2018 MEDICARE YEARLY EXAM 12/19/2018 MICROALBUMIN Q1 4/9/2019 LIPID PANEL Q1 4/9/2019 COLONOSCOPY 1/13/2020 DTaP/Tdap/Td series (2 - Td) 12/18/2027 Allergies as of 5/4/2018  Review Complete On: 5/4/2018 By: Leonardo Michael MD  
  
 Severity Noted Reaction Type Reactions Iodinated Contrast- Oral And Iv Dye  09/13/2016    Other (comments) Iodine  07/27/2015    Other (comments) Vasotec [Enalapril Maleate]  12/16/2016    Other (comments) Thought to have possibly caused his pancreatitis Current Immunizations  Reviewed on 12/18/2017 Name Date Influenza High Dose Vaccine PF 12/18/2017, 10/22/2015 Influenza Vaccine 12/1/2014 Zoster Vaccine, Live 1/1/2015 Not reviewed this visit You Were Diagnosed With   
  
 Codes Comments Idiopathic acute pancreatitis, unspecified complication status    -  Primary ICD-10-CM: K85.00 ICD-9-CM: 660.7 Pancreatic pseudocyst     ICD-10-CM: K86.3 ICD-9-CM: 577.2 on CT 12/2016 Vitals BP Pulse Temp Resp Height(growth percentile) Weight(growth percentile) 105/81 (BP 1 Location: Left arm, BP Patient Position: Sitting) (!) 55 95.4 °F (35.2 °C) (Oral) 17 5' 9\" (1.753 m) 256 lb 6.4 oz (116.3 kg) SpO2 BMI Smoking Status 98% 37.86 kg/m2 Former Smoker Vitals History BMI and BSA Data Body Mass Index Body Surface Area  
 37.86 kg/m 2 2.38 m 2 Preferred Pharmacy Pharmacy Name Phone [address] [place] [address], [address] [place] [address] [phone] Your Updated Medication List  
  
   
This list is accurate as of 5/4/18  8:33 AM.  Always use your most recent med list.  
  
  
  
  
 Corey Donovan Generic drug:  Blood-Glucose Meter  
by Does Not Apply route. * ACCU-CHEK SMARTVIEW TEST STRIP strip Generic drug:  glucose blood VI test strips  
by Does Not Apply route See Admin Instructions. * glucose blood VI test strips strip Commonly known as:  blood glucose test  
Test strips of patient choice - use to check blood sugar bid or as directed  
  
 acetaminophen 500 mg tablet Commonly known as:  TYLENOL  
500 mg. amLODIPine 5 mg tablet Commonly known as:  Angélica Shah Take 1 Tab by mouth daily. FOLIC ACID PO Take  by Mouth. insulin detemir U-100 100 unit/mL (3 mL) Inpn Commonly known as:  Rani Oviedo Inject 30 units subcutaneously in the evening Insulin Needles (Disposable) 32 gauge x 1/4\" Ndle Commonly known as:  NOVOFINE 32 USE AS DIRECTED WITH LEVEMIR DAILY Lancets [facility] Dispense Accu Check Multi-Clix Drum - use to check blood sugar bid or as directed - 90 day supply  
  
 losartan 50 mg tablet Commonly known as:  COZAAR Take 100 mg by mouth daily. metoprolol succinate 50 mg XL tablet Commonly known as:  TOPROL-XL Take 1 Tab by mouth daily. metoprolol tartrate 50 mg tablet Commonly known as:  LOPRESSOR  
TAKE ONE TABLET BY MOUTH TWICE DAILY NovoLOG Flexpen U-100 Insulin 100 unit/mL Inpn Generic drug:  insulin aspart U-100  
by SubCUTAneous route. 10 units subcutaneously 15 minutes before breakfast and 12 units 15 minutes before lunch and 14 units 15 minutes before dinner. red yeast rice 600 mg Tab Take  by Mouth Once a Day. tamsulosin 0.4 mg capsule Commonly known as:  FLOMAX Take 1 Cap by mouth daily (after dinner). THERA tablet Generic drug:  therapeutic multivitamin Take 1 Tab by mouth daily. VITAMIN D3 2,000 unit Cap capsule Generic drug:  Cholecalciferol (Vitamin D3)  
daily. warfarin 3 mg tablet Commonly known as:  COUMADIN Take 1 Tab by mouth daily. ZINC PO Take  by mouth. Take 1 tablet by mouth daily. * Notice: This list has 2 medication(s) that are the same as other medications prescribed for you. Read the directions carefully, and ask your doctor or other care provider to review them with you. We Performed the Following REFERRAL TO GASTROENTEROLOGY [MSW86 Custom] Comments:  
 Please evaluate patient for pancreatitis To-Do List   
 05/04/2018 Lab:  CBC WITH AUTOMATED DIFF   
  
 05/04/2018 Imaging:  CT ABD WO CONT   
  
 05/04/2018 Lab:  LIPASE   
  
 05/04/2018 Lab:  METABOLIC PANEL, COMPREHENSIVE Referral Information Referral ID Referred By Referred To [id number] Sera Yeboah Not Available Visits Status Start Date End Date 1 New Request 5/4/18 5/4/19 If your referral has a status of pending review or denied, additional information will be sent to support the outcome of this decision. Patient Instructions Pancreatitis: Care Instructions Your Care Instructions The pancreas is an organ behind the stomach. It makes hormones and enzymes to help your body digest food. But if these enzymes attack the pancreas, it can get inflamed. This is called pancreatitis. Most cases are caused by gallstones or by heavy alcohol use. If you take care of yourself at home, it will help you get better. It will also help you avoid more problems with your pancreas. Follow-up care is a key part of your treatment and safety. Be sure to make and go to all appointments, and call your doctor if you are having problems. It's also a good idea to know your test results and keep a list of the medicines you take. How can you care for yourself at home? · Drink clear liquids and eat bland foods until you feel better. [place] foods include rice, dry toast, and crackers. They also include bananas and applesauce. · Eat a low-fat diet until your doctor says your pancreas is healed. · Do not drink alcohol. Tell your doctor if you need help to quit. Counseling, support groups, and sometimes medicines can help you stay sober. · Be safe with medicines. Read and follow all instructions on the label. ¨ If the doctor gave you a prescription medicine for pain, take it as prescribed. ¨ If you are not taking a prescription pain medicine, ask your doctor if you can take an over-the-counter medicine. · If your doctor prescribed antibiotics, take them as directed. Do not stop taking them just because you feel better. You need to take the full course of antibiotics. · Get extra rest until you feel better. To prevent future problems with your pancreas · Do not drink alcohol. · Tell your doctors and pharmacist that you've had pancreatitis. They can help you avoid medicines that may cause this problem again. When should you call for help? Call 911 anytime you think you may need emergency care. For example, call if: 
? · You vomit blood or what looks like coffee grounds. ? · Your stools are maroon or very bloody. ?Call your doctor now or seek immediate medical care if: 
? · You have new or worse belly pain. ? · Your stools are black and look like tar, or they have streaks of blood. ? · You are vomiting. ? Watch closely for changes in your health, and be sure to contact your doctor if: 
? · You do not get better as expected. Where can you learn more? Go to http://tayler-joan.info/. Enter A203 in the search box to learn more about \"Pancreatitis: Care Instructions. \" Current as of: May 12, 2017 Content Version: 11.4 © 8056-0906 [employer]. Care instructions adapted under license by [employer] (which disclaims liability or warranty for this information). If you have questions about a medical condition or this instruction, always ask your healthcare professional. [address] any warranty or liability for your use of this information. Learning About Acute Pancreatitis What is acute pancreatitis? The pancreas is an organ behind the stomach. It makes hormones like insulin that help control how your body uses sugar. It also makes enzymes that help you digest food. Usually these enzymes flow from the pancreas to the intestines. But if they leak into the pancreas, they can irritate it and cause pain and swelling. When this happens suddenly, it's called acute pancreatitis. What causes it? Most of the time, acute pancreatitis is caused by gallstones or by heavy alcohol use. But several other things can cause it, such as: 
· High levels of fats in the blood. · An injury. · A problem after a surgery or a procedure. · Certain medicines. What are the symptoms? The main symptom is pain in the upper belly. The pain can be severe. You also may have a fever, nausea, or vomiting. Some people get so sick that they have problems breathing. They also may have low blood pressure. How is it diagnosed? Your doctor will diagnose pancreatitis with an exam and by looking at your lab tests. Your doctor may think that you have this problem based on your symptoms and where you have pain in your belly. You may have blood tests of enzymes called amylase and lipase. In pancreatitis, the level of these enzymes is usually much higher than normal. 
You also may have imaging tests of the belly. These may include an ultrasound, a CT scan, or an MRI. A special MRI called MRCP can show images of the bile ducts. This test can be very helpful when gallstones are causing the problem. How is it treated? Treatment of acute pancreatitis usually takes place in the hospital. It focuses on taking care of pain and supporting your body while your pancreas heals. In severe cases, treatment may occur in an intensive care unit to support breathing, treat serious infections, or help raise very low blood pressure. If a gallstone is causing the problem, the gallstone may need to be removed. This is done during a procedure called ERCP. The doctor puts a scope in your mouth and moves it gently through the stomach and into the ducts from the pancreas and gallbladder. The doctor is then able to see a stone and remove it. Sometimes the gallbladder, which makes gallstones, needs to be removed by surgery. People with pancreatitis often need a lot of fluid to help support their other organs and their blood pressure. They get fluids through a vein (intravenous, or IV). Instead of food by mouth, nutrition is sometimes given through a vein while the pancreas heals. Follow-up care is a key part of your treatment and safety. Be sure to make and go to all appointments, and call your doctor if you are having problems. It's also a good idea to know your test results and keep a list of the medicines you take. Where can you learn more? Go to http://tayler-joan.info/. Enter W881 in the search box to learn more about \"Learning About Acute Pancreatitis. \" Current as of: May 12, 2017 Content Version: 11.4 © 3892-8177 [employer]. Care instructions adapted under license by [employer]r (which disclaims liability or warranty for this information). If you have questions about a medical condition or this instruction, always ask your healthcare professional. [address] any warranty or liability for your use of this information. Introducing [facility]! [facility] introduces [employer] patient portal. Now you can access parts of your medical record, email your doctor's office, and request medication refills online. 1. In your internet browser, go to [email or web address] 2. Click on the First Time User? Click Here link in the Sign In box. You will see the New Member Sign Up page. 3. Enter your [employer] Access Code exactly as it appears below. You will not need to use this code after youve completed the sign-up process. If you do not sign up before the expiration date, you must request a new code. · [employer] Access Code: [email or web address] Expires: 7/8/2018 10:59 AM 
 
4. Enter the last four digits of your Social Security Number (xxxx) and Date of Birth (mm/dd/yyyy) as indicated and click Submit. You will be taken to the next sign-up page. 5. Create a Pandabust ID. This will be your [employer] login ID and cannot be changed, so think of one that is secure and easy to remember. 6. Create a Pand[employer]ust password. You can change your password at any time. 7. Enter your Password Reset Question and Answer. This can be used at a later time if you forget your password. 8. Enter your e-mail address. You will receive e-mail notification when new information is available in [address] 9. Click Sign Up. You can now view and download portions of your medical record. 10. Click the Download Summary menu link to download a portable copy of your medical information. If you have questions, please visit the Frequently Asked Questions section of the [employer] website. Remember, [employer] is NOT to be used for urgent needs. For medical emergencies, dial 911. Now available from your iPhone and Android! Please provide this summary of care documentation to your next provider. Your primary care clinician is listed as [employer]. If you have any questions after today's visit, please call [phone].

## 2018-05-16 ENCOUNTER — HOSPITAL ENCOUNTER (OUTPATIENT)
Dept: CT IMAGING | Age: 68
Discharge: HOME | End: 2018-05-16
Attending: INTERNAL MEDICINE
Payer: MEDICARE

## 2018-05-16 DIAGNOSIS — K85.00 IDIOPATHIC ACUTE PANCREATITIS, UNSPECIFIED COMPLICATION STATUS: ICD-10-CM

## 2018-05-16 PROCEDURE — 74150 CT ABDOMEN W/O CONTRAST: CPT

## 2018-05-30 ENCOUNTER — OFFICE VISIT (OUTPATIENT)
Dept: FAMILY MEDICINE CLINIC | Age: 68
End: 2018-05-30

## 2018-05-30 VITALS
HEART RATE: 50 BPM | WEIGHT: 259 LBS | HEIGHT: 69 IN | TEMPERATURE: 96.6 F | SYSTOLIC BLOOD PRESSURE: 129 MMHG | DIASTOLIC BLOOD PRESSURE: 67 MMHG | BODY MASS INDEX: 38.36 KG/M2 | RESPIRATION RATE: 16 BRPM

## 2018-05-30 DIAGNOSIS — N20.0 RIGHT KIDNEY STONE: ICD-10-CM

## 2018-05-30 DIAGNOSIS — K85.00 IDIOPATHIC ACUTE PANCREATITIS WITHOUT INFECTION OR NECROSIS: Primary | ICD-10-CM

## 2018-05-30 NOTE — PROGRESS NOTES
Jesenia Mckee is a 76 y.o. male and presents with Follow Up Chronic Condition; Results (CAT scan); and Abdominal Pain (resolved)       Subjective:    Pancreatitis- abd pain resolved- no other sx. Goes to GI 6/29/18. Dr. Tyesha Ho. No diarrhea. No fevers or chills. No skin bruising  Right kidney stone- no back pain or hematuria. Assessment/Plan:    Pancreatitis- continue to monitor for return of sx. Seeing GI in one month. Reviewed CT and labs with pt. he will call for any new symptoms or return to the office  Right kidney stone- discussed with- diet given, hydration encouraged carefully given h/o CHF (also discussed with pt today). RTC as scheduled 8/2018      Orders Placed This Encounter    Ubidecar/Fish Oil/Omega-3/Fadi (CO Q10-FISH OIL-OMEGA 3-E PO)     Sig: Take  by mouth daily. Final result (Exam End: 5/16/2018  7:54 AM) Open    Study Result   EXAM: CT of the abdomen     INDICATION: Pancreatitis. Epigastric pain for several days.     COMPARISON: None.     TECHNIQUE: Axial CT imaging of the abdomen were performed without intravenous  contrast. Multiplanar reformats were generated.     One or more dose reduction techniques were used on this CT: automated exposure  control, adjustment of the mAs and/or kVp according to patient's size, and  iterative reconstruction techniques. The specific techniques utilized on this CT  exam have been documented in the patient's electronic medical record.        _______________     FINDINGS:     LOWER CHEST: Unremarkable.     LIVER, BILIARY: Liver is normal. No biliary dilation.     PANCREAS: Limited evaluation without the benefit of oral or intravenous  contrast. Mild peripancreatic stranding adjacent to pancreatic tail in the  region of splenic hilus with additional stranding at the lateral margin of  spleen. It is my understanding that there is a history of prior pancreatic tail  pseudocyst and perisplenic phlegmon.  Above findings likely represent minimal  residual findings at the site of prior pseudocyst.      SPLEEN: Normal.     ADRENALS: Normal.     KIDNEYS/URETERS: Exophytic interpolar left renal cyst measures 3.5 cm. Exophytic  small cyst arising from upper pole left kidney measure about 1.1 cm 1.5 cm each. 2-3 mm calculi lower pole right kidney.     VASCULATURE: Aortic atherosclerosis.     LYMPH NODES: No enlarged lymph nodes.     GASTROINTESTINAL TRACT: No bowel dilation or wall thickening.     BONES: No acute or aggressive osseous abnormalities identified.     OTHER: None.     _______________     IMPRESSION  IMPRESSION:     1. Minimal soft tissue stranding adjacent to pancreatic tail and perisplenic  area likely residual mild inflammation or possibly scarring at the site of prior  pancreatitis. Difficult to exclude the presence of superimposed mild acute  pancreatitis without the benefit of oral or intravenous contrast.     2. Bilateral renal cysts. Right renal nonobstructing calculi.              ROS:  Negative except as mentioned above  Cardiac- no chest pain or palpitations  Pulmonary- no sob or wheezes  GI- no n/v or diarrhea. SH:  Social History   Substance Use Topics    Smoking status: Former Smoker     Packs/day: 1.00     Years: 42.00     Types: Cigarettes     Quit date: 9/22/2016    Smokeless tobacco: Never Used    Alcohol use Yes      Comment: occasional         Medications/Allergies:  Current Outpatient Prescriptions on File Prior to Visit   Medication Sig Dispense Refill    red yeast rice 600 mg tab Take  by Mouth Once a Day.  amLODIPine (NORVASC) 5 mg tablet Take 1 Tab by mouth daily. 90 Tab 1    metoprolol tartrate (LOPRESSOR) 50 mg tablet TAKE ONE TABLET BY MOUTH TWICE DAILY (Patient taking differently: Take 50 mg by mouth daily. TAKE ONE TABLET BY MOUTH TWICE DAILY) 670 Tab 1    FOLIC ACID PO Take  by Mouth.  acetaminophen (TYLENOL) 500 mg tablet 500 mg.      losartan (COZAAR) 50 mg tablet Take 100 mg by mouth daily.       tamsulosin (FLOMAX) 0.4 mg capsule Take 1 Cap by mouth daily (after dinner). 90 Cap 3    insulin aspart (NOVOLOG FLEXPEN) 100 unit/mL inpn by SubCUTAneous route. 11units subcutaneously 15 minutes before breakfast and 13 units 15 minutes before lunch and 15units 15 minutes before dinner.  ZINC PO Take  by mouth. Take 1 tablet by mouth daily.  insulin detemir (LEVEMIR FLEXTOUCH) 100 unit/mL (3 mL) inpn Inject 30 units subcutaneously in the evening (Patient taking differently: 30 Units nightly. Inject 30units subcutaneously in the evening) 15 mL 5    warfarin (COUMADIN) 3 mg tablet Take 1 Tab by mouth daily. (Patient taking differently: Take 3 mg by mouth daily. 2 tablets 1 days a week, 3 tablets 6days a week) 10 Tab 0    therapeutic multivitamin (THERA) tablet Take 1 Tab by mouth daily.  Insulin Needles, Disposable, (NOVOFINE 32) 32 gauge x 1/4\" ndle USE AS DIRECTED WITH LEVEMIR DAILY 100 Pen Needle 11    Lancets misc Dispense Accu Check Multi-Clix Drum - use to check blood sugar bid or as directed - 90 day supply 100 Each 3    glucose blood VI test strips (BLOOD GLUCOSE TEST) strip Test strips of patient choice - use to check blood sugar bid or as directed 200 Strip 3    Blood-Glucose Meter (ACCU-CHEK BRY) misc by Does Not Apply route.  glucose blood VI test strips (ACCU-CHEK SMARTVIEW) strip by Does Not Apply route See Admin Instructions.  Cholecalciferol, Vitamin D3, (VITAMIN D3) 2,000 unit cap daily.  metoprolol succinate (TOPROL-XL) 50 mg XL tablet Take 1 Tab by mouth daily. 90 Tab 1     No current facility-administered medications on file prior to visit.            Allergies   Allergen Reactions    Iodinated Contrast- Oral And Iv Dye Other (comments)    Iodine Other (comments)    Vasotec [Enalapril Maleate] Other (comments)     Thought to have possibly caused his pancreatitis       Objective:  Visit Vitals    /67    Pulse (!) 50    Temp 96.6 °F (35.9 °C) (Oral)    Resp 16    Ht 5' 9\" (1.753 m)    Wt 259 lb (117.5 kg)    BMI 38.25 kg/m2    Body mass index is 38.25 kg/(m^2). Constitutional: Well developed, nourished, no distress, alert   Eyes: Conjunctiva normal.    CV: S1, S2.  RRR. No murmurs/rubs. No thrills palpated. No carotid bruits. Intact distal pulses. No edema. Pulm: No abnormalities on inspection. Clear to auscultation bilaterally. No wheezing/rhonchi. Normal effort. GI: Soft, nontender, nondistended. Normal active bowel sounds. No  masses on palpation. No hepatosplenomegaly.

## 2018-05-30 NOTE — PROGRESS NOTES
1. Have you been to the ER, urgent care clinic since your last visit? Hospitalized since your last visit? No.     2. Have you seen or consulted any other health care providers outside of the [address] [place] [address] since your last visit? Include any pap smears or colon screening.  No.    Chief Complaint   Patient presents with    Follow Up Chronic Condition    Results     CAT scan

## 2018-05-30 NOTE — PATIENT INSTRUCTIONS
Here is the CT scan results:    IMPRESSION:     1. Minimal soft tissue stranding adjacent to pancreatic tail and perisplenic  area likely residual mild inflammation or possibly scarring at the site of prior  pancreatitis. Difficult to exclude the presence of superimposed mild acute  pancreatitis without the benefit of oral or intravenous contrast.     2. Bilateral renal cysts. Right renal nonobstructing calculi. Learning About Diet for Kidney Stone Prevention  What are kidney stones? Kidney stones are made of salts and minerals in the urine that form small \"gabriel. \" Stones can form in the kidneys and the ureters (the tubes that lead from the kidneys to the bladder). They can also form in the bladder. Stones may not cause a problem as long as they stay in the kidneys. But they can cause sudden, severe pain. Pain is most likely when the stones travel from the kidneys to the bladder. Kidney stones can cause bloody urine. Kidney stones often run in families. You are more likely to get them if you don't drink enough fluids, mainly water. Certain foods and drinks and some dietary supplements may also increase your risk for kidney stones if you consume too much of them. What can you do to prevent kidney stones? Changing what you eat may not prevent all types of kidney stones. But for people who have a history of certain kinds of kidney stones, some changes in diet may help. A dietitian can help you set up a meal plan that includes healthy, low-oxalate choices. Here are some general guidelines to get you started. Plan your meals and snacks around foods that are low in oxalate. These foods include:  · Corn, kale, parsnips, and squash,. · Beef, chicken, pork, turkey, and fish. · Milk, butter, cheese, and yogurt. You can eat certain foods that are medium-high in oxalate, but eat them only once in a while. These foods include:  · Bread. · Brown rice. · English muffins. · Figs. · Popcorn.   · String beans.  · Tomatoes. Limit very high-oxalate foods, including:  · Black tea. · Coffee. · Chocolate. · Dark green vegetables. · Nuts. Here are some other things you can do to help prevent kidney stones. · Drink plenty of fluids. If you have kidney, heart, or liver disease and have to limit fluids, talk with your doctor before you increase the amount of fluids you drink. · Do not take more than the recommended daily dose of vitamins C and D.  · Limit the salt in your diet. · Eat a balanced diet that is not too high in protein. Follow-up care is a key part of your treatment and safety. Be sure to make and go to all appointments, and call your doctor if you are having problems. It's also a good idea to know your test results and keep a list of the medicines you take. Where can you learn more? Go to http://tayler-joan.info/. Enter C138 in the search box to learn more about \"Learning About Diet for Kidney Stone Prevention. \"  Current as of: May 12, 2017  Content Version: 11.4  © 1641-7964 [employer]. Care instructions adapted under license by [employer] (which disclaims liability or warranty for this information). If you have questions about a medical condition or this instruction, always ask your healthcare professional. [address] any warranty or liability for your use of this information.

## 2018-05-30 NOTE — MR AVS SNAPSHOT
Mejia Rendon Lima [address] [facility] [address]. [address] [id number] 
[phone] Patient: Evelyn Sheridan MRN: [id number] Visit Information Date & Time Provider Department Dept. Phone Encounter #  
 5/30/2018  8:15 AM Enrique Mendosa, [address] [place] [address] [phone] [id number] Follow-up Instructions Return if symptoms worsen or fail to improve. Your Appointments 6/11/2018 10:15 AM  
ESTABLISHED PATIENT with Gilberto Dumont MD  
[facility] Appt Note: 8 MONTH F/U WITH PVR  
 [address] [facility] [id number]  
  
    
 8/9/2018  9:30 AM  
Follow Up with Enrique Mendosa MD  
[address] [facility] Appt Note: 4 mo f/u  
 [facility] Marc. [address]  
  
    
 12/20/2018  3:00 PM  
Office Visit with Enrique Mendosa MD  
Critical access hospital 23 [facility] Appt Note: [address] [place] [address] Marc. [address]  
  
   
 [address] Upcoming Health Maintenance Date Due  
 FOOT EXAM Q1 10/14/2016 EYE EXAM RETINAL OR DILATED Q1 12/15/2017 Influenza Age 5 to Adult 8/1/2018 HEMOGLOBIN A1C Q6M 9/1/2018 Prostate-Specific Antigen 10/10/2018 GLAUCOMA SCREENING Q2Y 12/15/2018 Pneumococcal 65+ Low/Medium Risk (2 of 2 - PPSV23) 12/18/2018 MEDICARE YEARLY EXAM 12/19/2018 MICROALBUMIN Q1 4/9/2019 LIPID PANEL Q1 4/9/2019 COLONOSCOPY 1/13/2020 DTaP/Tdap/Td series (2 - Td) 12/18/2027 Allergies as of 5/30/2018  Review Complete On: 5/30/2018 By: Enrique Mendosa MD  
  
 Severity Noted Reaction Type Reactions Iodinated Contrast- Oral And Iv Dye  09/13/2016    Other (comments) Iodine  07/27/2015    Other (comments) Vasotec [Enalapril Maleate]  12/16/2016    Other (comments) Thought to have possibly caused his pancreatitis Current Immunizations  Reviewed on 12/18/2017 Name Date Influenza High Dose Vaccine PF 12/18/2017, 10/22/2015 Influenza Vaccine 12/1/2014 Zoster Vaccine, Live 1/1/2015 Not reviewed this visit You Were Diagnosed With   
  
 Codes Comments Idiopathic acute pancreatitis without infection or necrosis    -  Primary ICD-10-CM: K85.00 ICD-9-CM: 459.3 2018- seeing Dr. Nicolas Wade for GI Right kidney stone     ICD-10-CM: N20.0 ICD-9-CM: 592.0 by Ct 5/2018 Insulin dependent type 2 diabetes mellitus, uncontrolled ([facility]     ICD-10-CM: E11.65, Z79.4 ICD-9-CM: 250.02, V58.67 Vitals BP Pulse Temp Resp Height(growth percentile) Weight(growth percentile) 129/67 (!) 50 96.6 °F (35.9 °C) (Oral) 16 5' 9\" (1.753 m) 259 lb (117.5 kg) BMI Smoking Status 38.25 kg/m2 Former Smoker BMI and BSA Data Body Mass Index Body Surface Area  
 38.25 kg/m 2 2.39 m 2 Preferred Pharmacy Pharmacy Name Phone [address] [place] [address], [address] [place] [address] [phone] Your Updated Medication List  
  
   
This list is accurate as of 5/30/18  9:02 AM.  Always use your most recent med list.  
  
  
  
  
 Fatou Form Generic drug:  Blood-Glucose Meter  
by Does Not Apply route. * ACCU-CHEK SMARTVIEW TEST STRIP strip Generic drug:  glucose blood VI test strips  
by Does Not Apply route See Admin Instructions. * glucose blood VI test strips strip Commonly known as:  blood glucose test  
Test strips of patient choice - use to check blood sugar bid or as directed  
  
 acetaminophen 500 mg tablet Commonly known as:  TYLENOL  
500 mg. amLODIPine 5 mg tablet Commonly known as:  Anita Vanndle Take 1 Tab by mouth daily.   
  
 CO Q10-FISH OIL-OMEGA 3-E PO  
 Take  by mouth daily. FOLIC ACID PO Take  by Mouth. insulin detemir U-100 100 unit/mL (3 mL) Inpn Commonly known as:  Jayme Loss Inject 30 units subcutaneously in the evening Insulin Needles (Disposable) 32 gauge x 1/4\" Ndle Commonly known as:  NOVOFINE 32 USE AS DIRECTED WITH LEVEMIR DAILY Lancets [facility] Dispense Accu Check Multi-Clix Drum - use to check blood sugar bid or as directed - 90 day supply  
  
 losartan 50 mg tablet Commonly known as:  COZAAR Take 100 mg by mouth daily. metoprolol succinate 50 mg XL tablet Commonly known as:  TOPROL-XL Take 1 Tab by mouth daily. metoprolol tartrate 50 mg tablet Commonly known as:  LOPRESSOR  
TAKE ONE TABLET BY MOUTH TWICE DAILY NovoLOG Flexpen U-100 Insulin 100 unit/mL Inpn Generic drug:  insulin aspart U-100  
by SubCUTAneous route. 11units subcutaneously 15 minutes before breakfast and 13 units 15 minutes before lunch and 15units 15 minutes before dinner. red yeast rice 600 mg Tab Take  by Mouth Once a Day. tamsulosin 0.4 mg capsule Commonly known as:  FLOMAX Take 1 Cap by mouth daily (after dinner). THERA tablet Generic drug:  therapeutic multivitamin Take 1 Tab by mouth daily. VITAMIN D3 2,000 unit Cap capsule Generic drug:  Cholecalciferol (Vitamin D3)  
daily. warfarin 3 mg tablet Commonly known as:  COUMADIN Take 1 Tab by mouth daily. ZINC PO Take  by mouth. Take 1 tablet by mouth daily. * Notice: This list has 2 medication(s) that are the same as other medications prescribed for you. Read the directions carefully, and ask your doctor or other care provider to review them with you. Follow-up Instructions Return if symptoms worsen or fail to improve. Patient Instructions Here is the CT scan results: 
 
IMPRESSION: 
  
1. Minimal soft tissue stranding adjacent to pancreatic tail and perisplenic area likely residual mild inflammation or possibly scarring at the site of prior 
pancreatitis. Difficult to exclude the presence of superimposed mild acute 
pancreatitis without the benefit of oral or intravenous contrast. 
  
2. Bilateral renal cysts. Right renal nonobstructing calculi. Learning About Diet for Kidney Stone Prevention What are kidney stones? Kidney stones are made of salts and minerals in the urine that form small \"gabriel. \" Stones can form in the kidneys and the ureters (the tubes that lead from the kidneys to the bladder). They can also form in the bladder. Stones may not cause a problem as long as they stay in the kidneys. But they can cause sudden, severe pain. Pain is most likely when the stones travel from the kidneys to the bladder. Kidney stones can cause bloody urine. Kidney stones often run in families. You are more likely to get them if you don't drink enough fluids, mainly water. Certain foods and drinks and some dietary supplements may also increase your risk for kidney stones if you consume too much of them. What can you do to prevent kidney stones? Changing what you eat may not prevent all types of kidney stones. But for people who have a history of certain kinds of kidney stones, some changes in diet may help. A dietitian can help you set up a meal plan that includes healthy, low-oxalate choices. Here are some general guidelines to get you started. Plan your meals and snacks around foods that are low in oxalate. These foods include: · Corn, kale, parsnips, and squash,. · Beef, chicken, pork, turkey, and fish. · Milk, butter, cheese, and yogurt. You can eat certain foods that are medium-high in oxalate, but eat them only once in a while. These foods include: · Bread. · Brown rice. · English muffins. · Figs. · Popcorn. · String beans. · Tomatoes. Limit very high-oxalate foods, including: · Black tea. · Coffee. · Chocolate. · Dark green vegetables. · Nuts. Here are some other things you can do to help prevent kidney stones. · Drink plenty of fluids. If you have kidney, heart, or liver disease and have to limit fluids, talk with your doctor before you increase the amount of fluids you drink. · Do not take more than the recommended daily dose of vitamins C and D. 
· Limit the salt in your diet. · Eat a balanced diet that is not too high in protein. Follow-up care is a key part of your treatment and safety. Be sure to make and go to all appointments, and call your doctor if you are having problems. It's also a good idea to know your test results and keep a list of the medicines you take. Where can you learn more? Go to http://tayler-joan.info/. Enter C138 in the search box to learn more about \"Learning About Diet for Kidney Stone Prevention. \" Current as of: May 12, 2017 Content Version: 11.4 © 1089-0822 [employer]. Care instructions adapted under license by [employer] (which disclaims liability or warranty for this information). If you have questions about a medical condition or this instruction, always ask your healthcare professional. [place] [address] any warranty or liability for your use of this information. Introducing [address]! [facility] [employer] introduces [employer] patient portal. Now you can access parts of your medical record, email your doctor's office, and request medication refills online. 1. In your internet browser, go to [email or web address]. [employer] 2. Click on the First Time User? Click Here link in the Sign In box. You will see the New Member Sign Up page. 3. Enter your [employer] Access Code exactly as it appears below. You will not need to use this code after youve completed the sign-up process. If you do not sign up before the expiration date, you must request a new code. · [employer] Access Code: [email or web address] Expires: 7/8/2018 10:59 AM 
 
 4. Enter the last four digits of your Social Security Number (xxxx) and Date of Birth (mm/dd/yyyy) as indicated and click Submit. You will be taken to the next sign-up page. 5. Create a SkyBridge ID. This will be your SkyBridge login ID and cannot be changed, so think of one that is secure and easy to remember. 6. Create a SkyBridge password. You can change your password at any time. 7. Enter your Password Reset Question and Answer. This can be used at a later time if you forget your password. 8. Enter your e-mail address. You will receive e-mail notification when new information is available in [address] 9. Click Sign Up. You can now view and download portions of your medical record. 10. Click the Download Summary menu link to download a portable copy of your medical information. If you have questions, please visit the Frequently Asked Questions section of the SkyBridge website. Remember, SkyBridge is NOT to be used for urgent needs. For medical emergencies, dial 911. Now available from your iPhone and Android! Please provide this summary of care documentation to your next provider. Your primary care clinician is listed as [facility]. If you have any questions after today's visit, please call [phone].

## 2018-06-02 DIAGNOSIS — K85.00 IDIOPATHIC ACUTE PANCREATITIS, UNSPECIFIED COMPLICATION STATUS: ICD-10-CM

## 2018-06-11 PROBLEM — E66.01 MORBID OBESITY DUE TO EXCESS CALORIES (HCC): Status: ACTIVE | Noted: 2018-06-11

## 2018-06-11 PROBLEM — K85.90 PANCREATITIS: Status: ACTIVE | Noted: 2017-08-14

## 2018-06-11 PROBLEM — E11.9 DIABETES MELLITUS (HCC): Status: ACTIVE | Noted: 2017-08-14

## 2018-08-09 ENCOUNTER — OFFICE VISIT (OUTPATIENT)
Dept: FAMILY MEDICINE CLINIC | Age: 68
End: 2018-08-09

## 2018-08-09 VITALS
HEART RATE: 47 BPM | RESPIRATION RATE: 16 BRPM | HEIGHT: 69 IN | BODY MASS INDEX: 38.95 KG/M2 | SYSTOLIC BLOOD PRESSURE: 115 MMHG | TEMPERATURE: 97.1 F | WEIGHT: 263 LBS | DIASTOLIC BLOOD PRESSURE: 57 MMHG

## 2018-08-09 DIAGNOSIS — E55.9 VITAMIN D DEFICIENCY: ICD-10-CM

## 2018-08-09 DIAGNOSIS — I48.0 PAROXYSMAL ATRIAL FIBRILLATION (HCC): ICD-10-CM

## 2018-08-09 DIAGNOSIS — K86.89 PANCREATIC DUCT DILATED: ICD-10-CM

## 2018-08-09 DIAGNOSIS — E66.01 MORBID OBESITY DUE TO EXCESS CALORIES (HCC): ICD-10-CM

## 2018-08-09 RX ORDER — METOPROLOL TARTRATE 25 MG/1
TABLET, FILM COATED ORAL
Qty: 60 TAB | Refills: 5 | Status: SHIPPED | OUTPATIENT
Start: 2018-08-09 | End: 2019-04-15 | Stop reason: SDUPTHER

## 2018-08-09 NOTE — PROGRESS NOTES
Michaela Taylor is a 76 y.o. male and presents with Follow Up Chronic Condition; Diabetes; Kidney Stone (RIGHT); Pancreatitis; and Medication Refill (Amlodipine needs 90day supply. )       Subjective:    Diabetes- not controlled- A1c 8.7% - following with endocrinology- sees them next month for follow up. Home bs 132 this am. No hypoglycemia. Diastolic CHF and paroxysmal AFib-no sx. No sob, no cp. Obesity- gained 10 lbs since last visit.   htn- no cp or sob. Vit D deficiency - no bone pain. Pancreatitis- resolved- no abd pain. Saw GI - reviewed MRCP- some dilated ducts but no obstruction visualized- short f/u recommended and pt states he has repeat scheduled for 3 months.        Assessment/Plan:    Diabetes- seeing [facility] soon. Encouraged to bring log book to them. Obesity- goal of 1 lb/week wt loss. Reviewed increased health risks of elevated weight. Diastolic CHF- no sx. Continue bp control.   htn- bp controlled- will reduced metoprolol to 25 mg BID given bradycardia. Discussed. Vit D def in past- recheck this. 48 4/2018. Continue supplement at 2000 IU daily repeat next visit.    pancreatic ductal dilation- keep MRCP repeat in 3 months and f/u with GI.     RTC in 4 months. Orders Placed This Encounter    VITAMIN D, 25 HYDROXY     Standing Status:   Future     Standing Expiration Date:   7/0/2271    METABOLIC PANEL, BASIC     Standing Status:   Future     Standing Expiration Date:   8/10/2019    CBC W/O DIFF     Standing Status:   Future     Standing Expiration Date:   8/10/2019    Ubidecar/Fish Oil/Omega-3/Fadi (CO Q10-FISH OIL-OMEGA 3-E PO)     Sig: Take  by mouth. 2 capsules daily.  multivit-min/folic/vit K/lycop (MEN'S DAILY FORMULA PO)     Sig: Take  by mouth. For [employer] prostate health.  1 capsule 2 times a day    metoprolol tartrate (LOPRESSOR) 25 mg tablet     Sig: TAKE ONE TABLET BY MOUTH TWICE DAILY     Dispense:  60 Tab     Refill:  5         Diagnoses and all orders for this visit: 1. Insulin dependent type 2 diabetes mellitus, uncontrolled ([facility].)  -     METABOLIC PANEL, BASIC; Future    2. Paroxysmal atrial fibrillation (HCC)  -     CBC W/O DIFF; Future    3. Morbid obesity due to excess calories ([facility].)    4. Vitamin D deficiency  -     VITAMIN D, 25 HYDROXY; Future    5. Pancreatic duct dilated    Other orders  -     metoprolol tartrate (LOPRESSOR) 25 mg tablet; TAKE ONE TABLET BY MOUTH TWICE DAILY          ROS:  Negative except as mentioned above  Cardiac- no chest pain or palpitations  Pulmonary- no sob or wheezes  GI- no n/v or diarrhea. SH:  Social History   Substance Use Topics    Smoking status: Former Smoker     Packs/day: 1.00     Years: 42.00     Types: Cigarettes     Quit date: 9/22/2016    Smokeless tobacco: Never Used    Alcohol use Yes      Comment: occasional         Medications/Allergies:  Current Outpatient Prescriptions on File Prior to Visit   Medication Sig Dispense Refill    losartan (COZAAR) 100 mg tablet Take 100 mg by mouth daily.  resveratrol 50 mg cap Take  by Mouth.  Ubidecar/Fish Oil/Omega-3/Fadi (CO Q10-FISH OIL-OMEGA 3-E PO) Take  by mouth daily.  red yeast rice 600 mg tab Take  by Mouth Once a Day.  amLODIPine (NORVASC) 5 mg tablet Take 1 Tab by mouth daily. 90 Tab 1    metoprolol tartrate (LOPRESSOR) 50 mg tablet TAKE ONE TABLET BY MOUTH TWICE DAILY (Patient taking differently: Take 50 mg by mouth daily. TAKE ONE TABLET BY MOUTH TWICE DAILY) 980 Tab 1    FOLIC ACID PO Take  by Mouth.  acetaminophen (TYLENOL) 500 mg tablet 500 mg.      insulin aspart (NOVOLOG FLEXPEN) 100 unit/mL inpn by SubCUTAneous route. 11units subcutaneously 15 minutes before breakfast and 13 units 15 minutes before lunch and 15units 15 minutes before dinner.  ZINC PO Take  by mouth. Take 1 tablet by mouth daily.       insulin detemir (LEVEMIR FLEXTOUCH) 100 unit/mL (3 mL) inpn Inject 30 units subcutaneously in the evening (Patient taking differently: 31 Units nightly. Inject 30units subcutaneously in the evening) 15 mL 5    warfarin (COUMADIN) 3 mg tablet Take 1 Tab by mouth daily. (Patient taking differently: Take 3 mg by mouth daily. 2 tablets 1 days a week, 3 tablets 6days a week) 10 Tab 0    therapeutic multivitamin (THERA) tablet Take 1 Tab by mouth daily.  Insulin Needles, Disposable, (NOVOFINE 32) 32 gauge x 1/4\" ndle USE AS DIRECTED WITH LEVEMIR DAILY 100 Pen Needle 11    Lancets misc Dispense Accu Check Multi-Clix Drum - use to check blood sugar bid or as directed - 90 day supply 100 Each 3    glucose blood VI test strips (BLOOD GLUCOSE TEST) strip Test strips of patient choice - use to check blood sugar bid or as directed 200 Strip 3    Blood-Glucose Meter (ACCU-CHEK BRY) misc by Does Not Apply route.  glucose blood VI test strips (ACCU-CHEK SMARTVIEW) strip by Does Not Apply route See Admin Instructions.  Cholecalciferol, Vitamin D3, (VITAMIN D3) 2,000 unit cap daily.  tamsulosin (FLOMAX) 0.4 mg capsule Take 1 Cap by mouth daily (after dinner). 90 Cap 3     No current facility-administered medications on file prior to visit. Allergies   Allergen Reactions    Iodinated Contrast- Oral And Iv Dye Other (comments)    Iodine Other (comments)    Vasotec [Enalapril Maleate] Other (comments)     Thought to have possibly caused his pancreatitis       Objective:  Visit Vitals    /57    Pulse (!) 47    Temp 97.1 °F (36.2 °C) (Oral)    Resp 16    Ht 5' 9\" (1.753 m)    Wt 263 lb (119.3 kg)    BMI 38.84 kg/m2    Body mass index is 38.84 kg/(m^2). Constitutional: Well developed, nourished, no distress, alert   CV: S1, S2.  RRR. No murmurs/rubs. No thrills palpated. No carotid bruits. Intact distal pulses. No edema. Pulm: No abnormalities on inspection. Clear to auscultation bilaterally. No wheezing/rhonchi. Normal effort. GI: Soft, nontender, nondistended. Normal active bowel sounds.  No masses on palpation. No hepatosplenomegaly.

## 2018-08-09 NOTE — PATIENT INSTRUCTIONS
Please reduce the metoprolol to 25 mg twice per day. Diabetes Foot Health: Care Instructions  Your Care Instructions    When you have diabetes, your feet need extra care and attention. Diabetes can damage the nerve endings and blood vessels in your feet, making you less likely to notice when your feet are injured. Diabetes also limits your body's ability to fight infection and get blood to areas that need it. If you get a minor foot injury, it could become an ulcer or a serious infection. With good foot care, you can prevent most of these problems. Caring for your feet can be quick and easy. Most of the care can be done when you are bathing or getting ready for bed. Follow-up care is a key part of your treatment and safety. Be sure to make and go to all appointments, and call your doctor if you are having problems. It's also a good idea to know your test results and keep a list of the medicines you take. How can you care for yourself at home? · Keep your blood sugar close to normal by watching what and how much you eat, monitoring blood sugar, taking medicines if prescribed, and getting regular exercise. · Do not smoke. Smoking affects blood flow and can make foot problems worse. If you need help quitting, talk to your doctor about stop-smoking programs and medicines. These can increase your chances of quitting for good. · Eat a diet that is low in fats. High fat intake can cause fat to build up in your blood vessels and decrease blood flow. · Inspect your feet daily for blisters, cuts, cracks, or sores. If you cannot see well, use a mirror or have someone help you. · Take care of your feet:  [facility] your feet every day. Use warm (not hot) water. Check the water temperature with your wrists or other part of your body, not your feet. ¨ Dry your feet well. Pat them dry. Do not rub the skin on your feet too hard. Dry well between your toes.  If the skin on your feet stays moist, bacteria or a fungus can grow, which can lead to infection. ¨ Keep your skin soft. Use moisturizing skin cream to keep the skin on your feet soft and prevent calluses and cracks. But do not put the cream between your toes, and stop using any cream that causes a rash. ¨ Clean underneath your toenails carefully. Do not use a sharp object to clean underneath your toenails. Use the blunt end of a nail file or other rounded tool. ¨ Trim and file your toenails straight across to prevent ingrown toenails. Use a nail clipper, not scissors. Use an emery board to smooth the edges. · Change socks daily. Socks without seams are best, because seams often rub the feet. You can find socks for people with diabetes from specialty catalogs. · Look inside your shoes every day for things like gravel or torn linings, which could cause blisters or sores. · Buy shoes that fit well:  ¨ Look for shoes that have plenty of space around the toes. This helps prevent bunions and blisters. ¨ Try on shoes while wearing the kind of socks you will usually wear with the shoes. ¨ Avoid plastic shoes. They may rub your feet and cause blisters. Good shoes should be made of materials that are flexible and breathable, such as leather or cloth. ¨ Break in new shoes slowly by wearing them for no more than an hour a day for several days. Take extra time to check your feet for red areas, blisters, or other problems after you wear new shoes. · Do not go barefoot. Do not wear sandals, and do not wear shoes with very thin soles. Thin soles are easy to puncture. They also do not protect your feet from hot pavement or cold weather. · Have your doctor check your feet during each visit. If you have a foot problem, see your doctor. Do not try to treat an early foot problem at home. Home remedies or treatments that you can buy without a prescription (such as corn removers) can be harmful. · Always get early treatment for foot problems.  A minor irritation can lead to a major problem if not properly cared for early. When should you call for help? Call your doctor now or seek immediate medical care if:    · You have a foot sore, an ulcer or break in the skin that is not healing after 4 days, bleeding corns or calluses, or an ingrown toenail.     · You have blue or black areas, which can mean bruising or blood flow problems.     · You have peeling skin or tiny blisters between your toes or cracking or oozing of the skin.     · You have a fever for more than 24 hours and a foot sore.     · You have new numbness or tingling in your feet that does not go away after you move your feet or change positions.     · You have unexplained or unusual swelling of the foot or ankle.    Watch closely for changes in your health, and be sure to contact your doctor if:    · You cannot do proper foot care. Where can you learn more? Go to http://tayler-joan.info/. Enter A739 in the search box to learn more about \"Diabetes Foot Health: Care Instructions. \"  Current as of: December 7, 2017  Content Version: 11.7  © 0963-8729 [email or web address]. Care instructions adapted under license by [employer] (which disclaims liability or warranty for this information). If you have questions about a medical condition or this instruction, always ask your healthcare professional. [address] any warranty or liability for your use of this information.

## 2018-08-09 NOTE — PROGRESS NOTES
1. Have you been to the ER, urgent care clinic since your last visit? Hospitalized since your last visit? No.     2. Have you seen or consulted any other health care providers outside of the [facility] since your last visit? Include any pap smears or colon screening. Yes, saw his urologist in 6/2018 and Dr. Radhika Estrada (gi) in 7/2018.      Chief Complaint   Patient presents with    Follow Up Chronic Condition    Diabetes    Kidney Stone     RIGHT    Pancreatitis

## 2018-08-09 NOTE — MR AVS SNAPSHOT
Mejia Michael [address] [facility] [address] [address] [phone] Patient: Lizbeth Yates MRN: [id number] BMM:[birth year] Visit Information Date & Time Provider Department Dept. Phone Encounter #  
 8/9/2018  9:30 AM Haylee Cabrera, [address] [place] [address] [phone] [id number] Follow-up Instructions Return in about 4 months (around 12/9/2018) for 30 minutes slot and labs prior. .  
  
Your Appointments 12/20/2018  3:00 PM  
Office Visit with Haylee Cabrera MD  
[address]) Appt Note: 295 [facility] 68 [facility] [address] 500 [facility]  
  
   
 [address] [place] [address] [address] 6/11/2019  9:45 AM  
XRAY Visit with DADA Luis [facility]. [place] [address] ([address] [place] [address]) Appt Note: 1 YR F/U WITH PVR; KUB PRIOR  
 [address]  
  
   
 [place] [id number]  
  
    
 6/11/2019 10:00 AM  
ESTABLISHED PATIENT with Christine Campos MD  
[facility]. [facility] [address] [place] [address]) Appt Note: 1 YR F/U WITH PVR; KUB PRIOR  
 [address] [address]  
[phone]  
  
   
 [place] [id number] Upcoming Health Maintenance Date Due Pneumococcal 65+ High/Highest Risk (1 of 2 - PCV13) 5/16/2015 FOOT EXAM Q1 10/14/2016 EYE EXAM RETINAL OR DILATED Q1 12/15/2017 HEMOGLOBIN A1C Q6M 9/1/2018 Influenza Age 5 to Adult 10/1/2018* Prostate-Specific Antigen 10/10/2018 GLAUCOMA SCREENING Q2Y 12/15/2018 MEDICARE YEARLY EXAM 12/19/2018 MICROALBUMIN Q1 4/9/2019 LIPID PANEL Q1 4/9/2019 COLONOSCOPY 1/13/2020 DTaP/Tdap/Td series (2 - Td) 12/18/2027 *Topic was postponed. The date shown is not the original due date.    
  
Allergies as of 8/9/2018  Review Complete On: 8/9/2018 By: Haylee Cabrera MD  
  
 Severity Noted Reaction Type Reactions Iodinated Contrast- Oral And Iv Dye  09/13/2016    Other (comments) Iodine  07/27/2015    Other (comments) Vasotec [Enalapril Maleate]  12/16/2016    Other (comments) Thought to have possibly caused his pancreatitis Current Immunizations  Reviewed on 12/18/2017 Name Date Influenza High Dose Vaccine PF 12/18/2017, 10/22/2015 Influenza Vaccine 12/1/2014 Zoster Vaccine, Live 1/1/2015 Not reviewed this visit Vitals BP Pulse Temp Resp Height(growth percentile) Weight(growth percentile) 115/57 (!) 47 97.1 °F (36.2 °C) (Oral) 16 5' 9\" (1.753 m) 263 lb (119.3 kg) BMI Smoking Status 38.84 kg/m2 Former Smoker BMI and BSA Data Body Mass Index Body Surface Area  
 38.84 kg/m 2 2.41 m 2 Preferred Pharmacy Pharmacy Name Phone [address] [place] [address], [address] [place] [address] [phone] Your Updated Medication List  
  
   
This list is accurate as of 8/9/18 10:36 AM.  Always use your most recent med list.  
  
  
  
  
 Ximena Santo Generic drug:  Blood-Glucose Meter  
by Does Not Apply route. * ACCU-CHEK SMARTVIEW TEST STRIP strip Generic drug:  glucose blood VI test strips  
by Does Not Apply route See Admin Instructions. * glucose blood VI test strips strip Commonly known as:  blood glucose test  
Test strips of patient choice - use to check blood sugar bid or as directed  
  
 acetaminophen 500 mg tablet Commonly known as:  TYLENOL  
500 mg. amLODIPine 5 mg tablet Commonly known as:  Med Montoya Take 1 Tab by mouth daily. * CO Q10-FISH OIL-OMEGA 3-E PO Take  by mouth daily. * CO Q10-FISH OIL-OMEGA 3-E PO Take  by mouth. 2 capsules daily. FOLIC ACID PO Take  by Mouth. insulin detemir U-100 100 unit/mL (3 mL) Inpn Commonly known as:  Coco Og Inject 30 units subcutaneously in the evening Insulin Needles (Disposable) 32 gauge x 1/4\" Ndle Commonly known as:  NOVOFINE 32 USE AS DIRECTED WITH LEVEMIR DAILY Lancets [facility] Dispense Accu Check Multi-Clix Drum - use to check blood sugar bid or as directed - 90 day supply  
  
 losartan 100 mg tablet Commonly known as:  COZAAR Take 100 mg by mouth daily. MEN'S DAILY FORMULA PO Take  by mouth. For [employer] prostate health. 1 capsule 2 times a day  
  
 metoprolol tartrate 25 mg tablet Commonly known as:  LOPRESSOR  
TAKE ONE TABLET BY MOUTH TWICE DAILY NovoLOG Flexpen U-100 Insulin 100 unit/mL Inpn Generic drug:  insulin aspart U-100  
by SubCUTAneous route. 11units subcutaneously 15 minutes before breakfast and 13 units 15 minutes before lunch and 15units 15 minutes before dinner. red yeast rice 600 mg Tab Take  by Mouth Once a Day. resveratrol 50 mg Cap Take  by Mouth. tamsulosin 0.4 mg capsule Commonly known as:  FLOMAX Take 1 Cap by mouth daily (after dinner). THERA tablet Generic drug:  therapeutic multivitamin Take 1 Tab by mouth daily. VITAMIN D3 2,000 unit Cap capsule Generic drug:  Cholecalciferol (Vitamin D3)  
daily. warfarin 3 mg tablet Commonly known as:  COUMADIN Take 1 Tab by mouth daily. ZINC PO Take  by mouth. Take 1 tablet by mouth daily. * Notice: This list has 4 medication(s) that are the same as other medications prescribed for you. Read the directions carefully, and ask your doctor or other care provider to review them with you. Prescriptions Sent to Pharmacy Refills  
 metoprolol tartrate (LOPRESSOR) 25 mg tablet 5 Sig: TAKE ONE TABLET BY MOUTH TWICE DAILY Class: Normal  
 Pharmacy: [address] [place] [address], [address] [place] [address] #: [phone] Follow-up Instructions Return in about 4 months (around 12/9/2018) for 30 minutes slot and labs prior. Candance Huger Patient Instructions Please reduce the metoprolol to 25 mg twice per day. Diabetes Foot Health: Care Instructions Your Care Instructions When you have diabetes, your feet need extra care and attention. Diabetes can damage the nerve endings and blood vessels in your feet, making you less likely to notice when your feet are injured. Diabetes also limits your body's ability to fight infection and get blood to areas that need it. If you get a minor foot injury, it could become an ulcer or a serious infection. With good foot care, you can prevent most of these problems. Caring for your feet can be quick and easy. Most of the care can be done when you are bathing or getting ready for bed. Follow-up care is a key part of your treatment and safety. Be sure to make and go to all appointments, and call your doctor if you are having problems. It's also a good idea to know your test results and keep a list of the medicines you take. How can you care for yourself at home? · Keep your blood sugar close to normal by watching what and how much you eat, monitoring blood sugar, taking medicines if prescribed, and getting regular exercise. · Do not smoke. Smoking affects blood flow and can make foot problems worse. If you need help quitting, talk to your doctor about stop-smoking programs and medicines. These can increase your chances of quitting for good. · Eat a diet that is low in fats. High fat intake can cause fat to build up in your blood vessels and decrease blood flow. · Inspect your feet daily for blisters, cuts, cracks, or sores. If you cannot see well, use a mirror or have someone help you. · Take care of your feet: 
[facility] your feet every day. Use warm (not hot) water. Check the water temperature with your wrists or other part of your body, not your feet. ¨ Dry your feet well. Pat them dry. Do not rub the skin on your feet too hard. Dry well between your toes.  If the skin on your feet stays moist, bacteria or a fungus can grow, which can lead to infection. ¨ Keep your skin soft. Use moisturizing skin cream to keep the skin on your feet soft and prevent calluses and cracks. But do not put the cream between your toes, and stop using any cream that causes a rash. ¨ Clean underneath your toenails carefully. Do not use a sharp object to clean underneath your toenails. Use the blunt end of a nail file or other rounded tool. ¨ Trim and file your toenails straight across to prevent ingrown toenails. Use a nail clipper, not scissors. Use an emery board to smooth the edges. · Change socks daily. Socks without seams are best, because seams often rub the feet. You can find socks for people with diabetes from specialty catalogs. · Look inside your shoes every day for things like gravel or torn linings, which could cause blisters or sores. · Buy shoes that fit well: 
¨ Look for shoes that have plenty of space around the toes. This helps prevent bunions and blisters. ¨ Try on shoes while wearing the kind of socks you will usually wear with the shoes. ¨ Avoid plastic shoes. They may rub your feet and cause blisters. Good shoes should be made of materials that are flexible and breathable, such as leather or cloth. ¨ Break in new shoes slowly by wearing them for no more than an hour a day for several days. Take extra time to check your feet for red areas, blisters, or other problems after you wear new shoes. · Do not go barefoot. Do not wear sandals, and do not wear shoes with very thin soles. Thin soles are easy to puncture. They also do not protect your feet from hot pavement or cold weather. · Have your doctor check your feet during each visit. If you have a foot problem, see your doctor. Do not try to treat an early foot problem at home. Home remedies or treatments that you can buy without a prescription (such as corn removers) can be harmful. · Always get early treatment for foot problems. A minor irritation can lead to a major problem if not properly cared for early. When should you call for help? Call your doctor now or seek immediate medical care if: 
  · You have a foot sore, an ulcer or break in the skin that is not healing after 4 days, bleeding corns or calluses, or an ingrown toenail.  
  · You have blue or black areas, which can mean bruising or blood flow problems.  
  · You have peeling skin or tiny blisters between your toes or cracking or oozing of the skin.  
  · You have a fever for more than 24 hours and a foot sore.  
  · You have new numbness or tingling in your feet that does not go away after you move your feet or change positions.  
  · You have unexplained or unusual swelling of the foot or ankle.  
 Watch closely for changes in your health, and be sure to contact your doctor if: 
  · You cannot do proper foot care. Where can you learn more? Go to http://tayler-joan.info/. Enter A739 in the search box to learn more about \"Diabetes Foot Health: Care Instructions. \" Current as of: December 7, 2017 Content Version: 11.7 © 8206-2805 [employer]. Care instructions adapted under license by [employer] (which disclaims liability or warranty for this information). If you have questions about a medical condition or this instruction, always ask your healthcare professional. [place] [address] any warranty or liability for your use of this information. Introducing [facility]! [facility] introduces [employer] patient portal. Now you can access parts of your medical record, email your doctor's office, and request medication refills online. 1. In your internet browser, go to [email or web address] 2. Click on the First Time User? Click Here link in the Sign In box. You will see the New Member Sign Up page. 3. Enter your [employer] Access Code exactly as it appears below. You will not need to use this code after youve completed the sign-up process. If you do not sign up before the expiration date, you must request a new code. · [employer] Access Code: [email or web address] Expires: 11/7/2018 10:36 AM 
 
4. Enter the last four digits of your Social Security Number (xxxx) and Date of Birth (mm/dd/yyyy) as indicated and click Submit. You will be taken to the next sign-up page. 5. Create a WellTek ID. This will be your WellT[employer] login ID and cannot be changed, so think of one that is secure and easy to remember. 6. Create a [employer] password. You can change your password at any time. 7. Enter your Password Reset Question and Answer. This can be used at a later time if you forget your password. 8. Enter your e-mail address. You will receive e-mail notification when new information is available in [address] 9. Click Sign Up. You can now view and download portions of your medical record. 10. Click the Download Summary menu link to download a portable copy of your medical information. If you have questions, please visit the Frequently Asked Questions section of the [employer] website. Remember, [employer] is NOT to be used for urgent needs. For medical emergencies, dial 911. Now available from your iPhone and Android! Please provide this summary of care documentation to your next provider. Your primary care clinician is listed as WALESKA Liev. If you have any questions after today's visit, please call [phone].

## 2018-09-25 ENCOUNTER — CLINICAL SUPPORT (OUTPATIENT)
Dept: FAMILY MEDICINE CLINIC | Age: 68
End: 2018-09-25

## 2018-09-25 DIAGNOSIS — Z23 ENCOUNTER FOR IMMUNIZATION: Primary | ICD-10-CM

## 2018-09-25 NOTE — PATIENT INSTRUCTIONS
Vaccine Information Statement    Influenza (Flu) Vaccine (Inactivated or Recombinant): What you need to know    Many Vaccine Information Statements are available in Estonian and other languages. See www.immunize.org/vis  Hojas de Información Sobre Vacunas están disponibles en Español y en muchos otros idiomas. Visite www.immunize.org/vis    1. Why get vaccinated? Influenza (flu) is a contagious disease that spreads around the [place] every year, usually between October and May. Flu is caused by influenza viruses, and is spread mainly by coughing, sneezing, and close contact. Anyone can get flu. Flu strikes suddenly and can last several days. Symptoms vary by age, but can include:   fever/chills   sore throat   muscle aches   fatigue   cough   headache    runny or stuffy nose    Flu can also lead to pneumonia and blood infections, and cause diarrhea and seizures in children. If you have a medical condition, such as heart or lung disease, flu can make it worse. Flu is more dangerous for some people. Infants and young children, people 72years of age and older, pregnant women, and people with certain health conditions or a weakened immune system are at greatest risk. Each year thousands of people in the [facility] die from flu, and many more are hospitalized. Flu vaccine can:   keep you from getting flu,   make flu less severe if you do get it, and   keep you from spreading flu to your family and other people. 2. Inactivated and recombinant flu vaccines    A dose of flu vaccine is recommended every flu season. Children 6 months through 6years of age may need two doses during the same flu season. Everyone else needs only one dose each flu season.        Some inactivated flu vaccines contain a very small amount of a mercury-based preservative called thimerosal. Studies have not shown thimerosal in vaccines to be harmful, but flu vaccines that do not contain thimerosal are available. There is no live flu virus in flu shots. They cannot cause the flu. There are many flu viruses, and they are always changing. Each year a new flu vaccine is made to protect against three or four viruses that are likely to cause disease in the upcoming flu season. But even when the vaccine doesnt exactly match these viruses, it may still provide some protection    Flu vaccine cannot prevent:   flu that is caused by a virus not covered by the vaccine, or   illnesses that look like flu but are not. It takes about 2 weeks for protection to develop after vaccination, and protection lasts through the flu season. 3. Some people should not get this vaccine    Tell the person who is giving you the vaccine:     If you have any severe, life-threatening allergies. If you ever had a life-threatening allergic reaction after a dose of flu vaccine, or have a severe allergy to any part of this vaccine, you may be advised not to get vaccinated. Most, but not all, types of flu vaccine contain a small amount of egg protein.  If you ever had Guillain-Barré Syndrome (also called GBS). Some people with a history of GBS should not get this vaccine. This should be discussed with your doctor.  If you are not feeling well. It is usually okay to get flu vaccine when you have a mild illness, but you might be asked to come back when you feel better. 4. Risks of a vaccine reaction    With any medicine, including vaccines, there is a chance of reactions. These are usually mild and go away on their own, but serious reactions are also possible. Most people who get a flu shot do not have any problems with it.      Minor problems following a flu shot include:    soreness, redness, or swelling where the shot was given     hoarseness   sore, red or itchy eyes   cough   fever   aches   headache   itching   fatigue  If these problems occur, they usually begin soon after the shot and last 1 or 2 days. More serious problems following a flu shot can include the following:     There may be a small increased risk of Guillain-Barré Syndrome (GBS) after inactivated flu vaccine. This risk has been estimated at 1 or 2 additional cases per million people vaccinated. This is much lower than the risk of severe complications from flu, which can be prevented by flu vaccine.  Young children who get the flu shot along with pneumococcal vaccine (PCV13) and/or DTaP vaccine at the same time might be slightly more likely to have a seizure caused by fever. Ask your doctor for more information. Tell your doctor if a child who is getting flu vaccine has ever had a seizure. Problems that could happen after any injected vaccine:      People sometimes faint after a medical procedure, including vaccination. Sitting or lying down for about 15 minutes can help prevent fainting, and injuries caused by a fall. Tell your doctor if you feel dizzy, or have vision changes or ringing in the ears.  Some people get severe pain in the shoulder and have difficulty moving the arm where a shot was given. This happens very rarely.  Any medication can cause a severe allergic reaction. Such reactions from a vaccine are very rare, estimated at about 1 in a million doses, and would happen within a few minutes to a few hours after the vaccination. As with any medicine, there is a very remote chance of a vaccine causing a serious injury or death. The safety of vaccines is always being monitored. For more information, visit: www.cdc.gov/vaccinesafety/    5. What if there is a serious reaction? What should I look for?  Look for anything that concerns you, such as signs of a severe allergic reaction, very high fever, or unusual behavior.     Signs of a severe allergic reaction can include hives, swelling of the face and throat, difficulty breathing, a fast heartbeat, dizziness, and weakness - usually within a few minutes to a few hours after the vaccination. What should I do?  If you think it is a severe allergic reaction or other emergency that cant wait, call 9-1-1 and get the person to the nearest hospital. Otherwise, call your doctor.  Reactions should be reported to the Vaccine Adverse Event Reporting System (VAERS). Your doctor should file this report, or you can do it yourself through  the VAERS web site at www.vaers. [facility].gov, or by calling 3-597.669.5095. VAERS does not give medical advice. 6. The National Vaccine Injury Compensation Program    The [facility] Vaccine Injury Compensation Program (VICP) is a federal program that was created to compensate people who may have been injured by certain vaccines. Persons who believe they may have been injured by a vaccine can learn about the program and about filing a claim by calling 6-654.134.5298 or visiting the [facility] website at www.[facility].gov/vaccinecompensation. There is a time limit to file a claim for compensation. 7. How can I learn more?  Ask your healthcare provider. He or she can give you the vaccine package insert or suggest other sources of information.  Call your local or state health department.  Contact the Centers for Disease Control and Prevention (CDC):  - Call 9-656.328.4610 (1-800-CDC-INFO) or  - Visit CDCs website at www.cdc.gov/flu    Vaccine Information Statement   Inactivated Influenza Vaccine   8/7/2015  42 PADMINI Henri Hany [id number]    Department of Health and Human Services  Centers for Disease Control and Prevention    Office Use Only

## 2018-09-28 NOTE — PROGRESS NOTES
Patient presents for Flu vaccination. Area prepped and injection given in the right deltoid muscle. No signs nor symptoms of adverse reaction noted. Patient tolerated injection well.

## 2018-12-07 PROBLEM — C25.0 MALIGNANT NEOPLASM OF HEAD OF PANCREAS (HCC): Status: ACTIVE | Noted: 2018-12-07

## 2018-12-18 ENCOUNTER — OFFICE VISIT (OUTPATIENT)
Dept: FAMILY MEDICINE CLINIC | Age: 68
End: 2018-12-18

## 2018-12-18 VITALS
HEART RATE: 58 BPM | WEIGHT: 254 LBS | SYSTOLIC BLOOD PRESSURE: 123 MMHG | TEMPERATURE: 97 F | DIASTOLIC BLOOD PRESSURE: 64 MMHG | HEIGHT: 69 IN | RESPIRATION RATE: 17 BRPM | BODY MASS INDEX: 37.62 KG/M2

## 2018-12-18 DIAGNOSIS — I48.0 PAROXYSMAL ATRIAL FIBRILLATION (HCC): ICD-10-CM

## 2018-12-18 DIAGNOSIS — Z01.818 PREOPERATIVE EXAMINATION: Primary | ICD-10-CM

## 2018-12-18 NOTE — PROGRESS NOTES
Kaley Barnes is a 76 y.o. male and presents for pre-operative evaluation. Subjective: This patient was seen at the request of Dr. Mihai Ennis for pre-operative evaluation for planned procedure: pancreatic mass removal on 1/10/18. Had pancreatic bx and aspiration- suspicious for malignancy. Seeing Dr. Sally Guzman from oncology as well. General anesthesia. Cardiac factors include:  H/o CHF- follows with Dr. Dayana Palacios  DMII- uncontrolled- sees [facility] endocrinology. METs: tolerates over 4 mets of activity without sob/cp/n/v/diaphoresis. ROS:  Constitutional: No recent weight change. No weakness/fatigue. No f/c. Skin: No rashes, change in nails/hair, itching   HENT: No HA, dizziness. No hearing loss/tinnitus. No nasal congestion/discharge. Eyes: No change in vision, double/blurred vision or eye pain/redness. Cardiovascular: No CP/palpitations. No LOCKETT/orthopnea/PND. Respiratory: No cough/sputum, dyspnea, wheezing. Gastointestinal: No dysphagia, reflux. No n/v. No constipation/diarrhea. No melena/rectal bleeding. Genitourinary: No dysuria, urinary hesitancy, nocturia, hematuria. No incontinence. Musculoskeletal: No joint pain/stiffness. No muscle pain/tenderness. Heme: No h/o anemia. No easy bleeding/bruising. Neurological: No seizures/numbness/weakness. No paresthesias. PMH:  Patient Active Problem List   Diagnosis Code    Hypertension I10    Hyperlipidemia E78.5    Vitamin D deficiency E55.9    Sleep apnea G47.30    Stasis dermatitis I87.2    Internal hemorrhoid K64.8    Microalbuminuria R80.9    Insulin dependent type 2 diabetes mellitus, uncontrolled (HCC) E11.65, Z79.4    Testicular pain N50.819    DDD (degenerative disc disease), lumbar M51.36    Paroxysmal atrial fibrillation (HCC) I48.0    Acute diastolic CHF (congestive heart failure) (HCC) I50.31    Nocturia R35.1    Anemia D64.9    Severe obesity (BMI 35.0-39. 9) with comorbidity ([address] E66.01    Former smoker Z87.891    History of acute pancreatitis Z87.19    Pancreatic pseudocyst K86.3    Idiopathic acute pancreatitis without infection or necrosis K85.00    Right kidney stone N20.0    Acute kidney injury ([facility] [address] N17.9    Lumbago M54.5    Morbid obesity due to excess calories (HCC) E66.01    Pancreatitis K85.90    Type 2 diabetes mellitus without complication ([facility] [address] H56.5    Pancreatic duct dilated K86.89    Malignant neoplasm of head of pancreas (HCC) C25.0       PSH:  Past Surgical History:   Procedure Laterality Date    HX CATARACT REMOVAL      x2    HX COLONOSCOPY  2015    Dr. Van Allen - repeat     HX CYST REMOVAL      Back         SH:  Social History     Tobacco Use    Smoking status: Former Smoker     Packs/day: 1.00     Years: 42.00     Pack years: 42.00     Types: Cigarettes     Last attempt to quit: 2016     Years since quittin.2    Smokeless tobacco: Never Used   Substance Use Topics    Alcohol use: Yes     Comment: occasional    Drug use: No       FH:  Family History   Problem Relation Age of Onset   24 [facility] Cancer Mother         esophageal    Diabetes Mother     Cancer Father         lung    Diabetes Maternal Grandmother        Medications/Allergies:  Current Outpatient Medications on File Prior to Visit   Medication Sig Dispense Refill    MILK THISTLE PO Take 250 mg by mouth daily.  OTHER glyco-betic 2 tablet daily      Ubidecar/Fish Oil/Omega-3/Fadi (CO Q10-FISH OIL-OMEGA 3-E PO) Take  by mouth. 2 capsules daily.  multivit-min/folic/vit K/lycop (MEN'S DAILY FORMULA PO) Take  by mouth. For optimas prostate health. 1 capsule 2 times a day      metoprolol tartrate (LOPRESSOR) 25 mg tablet TAKE ONE TABLET BY MOUTH TWICE DAILY (Patient taking differently: Take 25 mg by mouth two (2) times a day. TAKE ONE TABLET BY MOUTH TWICE DAILY) 60 Tab 5    losartan (COZAAR) 100 mg tablet Take 100 mg by mouth daily.  resveratrol 50 mg cap Take  by Mouth.       Ubidecar/Fish Oil/Omega-3/Fadi (CO Q10-FISH OIL-OMEGA 3-E PO) Take  by mouth daily.  red yeast rice 600 mg tab Take  by Mouth Once a Day.  amLODIPine (NORVASC) 5 mg tablet Take 1 Tab by mouth daily. 90 Tab 1    FOLIC ACID PO Take  by Mouth.  acetaminophen (TYLENOL) 500 mg tablet 500 mg.      insulin aspart (NOVOLOG FLEXPEN) 100 unit/mL inpn by SubCUTAneous route. 15 units subcutaneously 15 minutes before breakfast and 15units 15 minutes before lunch and 15units 15 minutes before dinner.  ZINC PO Take  by mouth. Take 1 tablet by mouth daily.  insulin detemir (LEVEMIR FLEXTOUCH) 100 unit/mL (3 mL) inpn Inject 30 units subcutaneously in the evening (Patient taking differently: 35 Units nightly. Inject 30units subcutaneously in the evening) 15 mL 5    warfarin (COUMADIN) 3 mg tablet Take 1 Tab by mouth daily. (Patient taking differently: Take 3 mg by mouth daily. 2 tablets 1 days a week, 3 tablets 6days a week) 10 Tab 0    therapeutic multivitamin (THERA) tablet Take 1 Tab by mouth daily.  Insulin Needles, Disposable, (NOVOFINE 32) 32 gauge x 1/4\" ndle USE AS DIRECTED WITH LEVEMIR DAILY 100 Pen Needle 11    Lancets misc Dispense Accu Check Multi-Clix Drum - use to check blood sugar bid or as directed - 90 day supply 100 Each 3    glucose blood VI test strips (BLOOD GLUCOSE TEST) strip Test strips of patient choice - use to check blood sugar bid or as directed 200 Strip 3    Blood-Glucose Meter (ACCU-CHEK BRY) misc by Does Not Apply route.  glucose blood VI test strips (ACCU-CHEK SMARTVIEW) strip by Does Not Apply route See Admin Instructions.  Cholecalciferol, Vitamin D3, (VITAMIN D3) 2,000 unit cap daily.  azithromycin (ZITHROMAX) 500 mg tab Take 1 Tab by mouth daily. 2 Tab 0     No current facility-administered medications on file prior to visit.        Allergies   Allergen Reactions    Iodinated Contrast- Oral And Iv Dye Other (comments)    Iodine Other (comments)  Vasotec [Enalapril Maleate] Other (comments)     Thought to have possibly caused his pancreatitis       Objective:  Visit Vitals  /64   Pulse (!) 58   Temp 97 °F (36.1 °C) (Oral)   Resp 17   Ht 5' 9\" (1.753 m)   Wt 254 lb (115.2 kg)   BMI 37.51 kg/m²    Body mass index is 37.51 kg/m². Gen: Well developed, nourished, no distress, alert, obese habitus   HENT: Exterior ears and tympanic membranes normal bilaterally. Supple neck. No thyromegaly or lymphadenopathy. Oropharynx clear and moist mucous membranes. Eyes: Conjunctiva normal. PERRL. CV: S1, S2.  RRR. No murmurs/rubs. No thrills palpated. No carotid bruits. Intact distal pulses. No edema. Pulm: No abnormalities on inspection. Clear to auscultation bilaterally. No wheezing/rhonchi. Normal effort. GI: Soft, nontender, nondistended. Normal active bowel sounds. No  masses on palpation. No hepatosplenomegaly. MS: Gait normal.  Joints without deformity/tenderness. Strength intact bilateral upper and lower ext. Normal ROM all extremities. Neuro: A/O x 3.  CN 2-12 intact. 2+DTR. No focal motor or sensory deficits. Speech normal.   Skin: No lesions/rashes on inspection. Psych: Appropriate affect, judgement and insight. Short-term memory intact. Labwork and Ancillary Studies:    CBC w/Diff  Lab Results   Component Value Date/Time    WBC 7.5 09/23/2018 04:20 PM    HCT 35.0 (L) 09/23/2018 04:20 PM    MCV 92.1 09/23/2018 04:20 PM        Basic Metabolic Profile  Lab Results   Component Value Date     09/23/2018    CO2 26 09/23/2018    BUN 24 09/23/2018         Assessment/Plan:    The patient is moderate risk for planned procedure and may proceed to OR without further testing other than as recommended by endocrinology and cardiology. Discussed risks of surgery but given potential pancreatic cancer would likely outweigh these.      The following recommendations were made for medication regimen prior to surgery:  Stop coumadin prior to surgery as per cardiology recommendations. Continue taking HTN meds prior to surgery. Take 1/2 dose of basal insulin. Hold short-acting insulin on morning of surgery. Or as recommended by endocrinology  Pt was asked to be sure to schedule an appt with both cardiology and endocrinology prior to the procedure. Medical Cytology Report                           Case: [id number]                                   Authorizing Provider:  Curly Arteaga MD   Collected:           11/20/2018 0920              Ordering Location:     [facility]    Received:            11/20/2018 1238              [facility]                                                           Pathologist:           Ofelia Healy MD                                                         Specimen:    Pancreas, head                                                                             Diagnosis PANCREAS, HEAD, ENDOSCOPIC ULTRASOUND GUIDED FINE NEEDLE ASPIRATION; DIRECT SMEARS AND CELL BLOCK:    - SUSPICIOUS FOR MALIGNANCY    COMMENT: Clusters of cells with enlarged nuclei and nuclear crowding are seen.  The findings are suspicious for carcinoma.  Dr. Rita Moses, staff pathologist, has reviewed this case and concurs. Electronic Signature: Kendra Arthur. [facility]ren M.D.  ([phone])    CPT: [id number], [id number], [id number]     Specimen Adequacy Satisfactory for evaluation.     Gross Description Pancreas, head, endoscopic ultrasound guided fine needle aspiration. 3 passes. Received 3 air dried slides for Diff Quik stain, 3 fixed slides for pap stain, and 30 cc's pink, hazy needle rinse in Cytolyt with tissue fragments. Prepared 1 cell block.     Preliminary Interpretation Rare ductal groups with atypia.  (MS 11/20/18 @ 9:49am)     ASR Statement Immunohistochemical testing, when applicable, has been developed and its performance determined by the Immunohistochemistry Laboratory at [facility]. It has not been cleared or approved by the U.S. Food and Drug Administration (FDA). The FDA has determined that such clearance or approval is not necessary. These tests should be used as an aid for clinical purposes and integrated with other findings.  This laboratory is certified to perform high complexity testing under the Clinical Laboratory Improvement Amendments of 1998.     Specimen

## 2018-12-18 NOTE — PROGRESS NOTES
1. Have you been to the ER, urgent care clinic since your last visit? Hospitalized since your last visit? 2. Have you seen or consulted any other health care providers outside of the [address] [place] [address] since your last visit? Include any pap smears or colon screening.      Chief Complaint   Patient presents with   [facility] Wellness Visit

## 2018-12-18 NOTE — PATIENT INSTRUCTIONS
How to Prepare for Surgery  How do you prepare for surgery? Surgery can be stressful. This information will help you understand what you can expect. And it will help you safely prepare for surgery. Follow-up care is a key part of your treatment and safety. Be sure to make and go to all appointments, and call your doctor if you are having problems. It's also a good idea to know your test results and keep a list of the medicines you take. What happens before surgery?   Preparing for surgery    · Understand exactly what surgery is planned, along with the risks, benefits, and other options. · Tell your doctors ALL the medicines, vitamins, supplements, and herbal remedies you take. Some of these can increase the risk of bleeding or interact with anesthesia.     · If you take blood thinners, such as warfarin (Coumadin), clopidogrel (Plavix), or aspirin, be sure to talk to your doctor. He or she will tell you if you should stop taking these medicines before your surgery. Make sure that you understand exactly what your doctor wants you to do.     · Your doctor will tell you which medicines to take or stop before your surgery. You may need to stop taking certain medicines a week or more before surgery. So talk to your doctor as soon as you can.     · If you have an advance directive, let your doctor know. It may include a living will and a durable power of  for health care. Bring a copy to the hospital. If don't have one, you may want to prepare one. It lets your doctor and loved ones know your health care wishes. Doctors advise that everyone prepare these papers before any type of surgery or procedure. What happens on the day of surgery?    · Follow the instructions about when to stop eating and drinking. If you don't, your surgery may be canceled.  If your doctor told you to take your medicines on the day of surgery, take them with only a sip of water.     · Take a bath or shower before coming in for your surgery. Do not apply lotions, perfumes, deodorants, or nail polish.     · Do not shave the surgical site yourself.     · Take off all jewelry and piercings. And take out contact lenses, if you wear them.    At the hospital or surgery center   · Bring a picture ID.     · The area for surgery is often marked to make sure there are no errors.     · You will be kept comfortable and safe by your anesthesia provider. The anesthesia may make you sleep. Or it may just numb the area being worked on. Going home   · Be sure you have someone to drive you home. Anesthesia and pain medicine make it unsafe for you to drive.     · You will be given more specific instructions about recovering from your surgery. They will cover things like diet, wound care, follow-up care, driving, and getting back to your normal routine. When should you call your doctor? · You have questions or concerns.     · You don't understand how to prepare for your surgery.     · You become ill before the surgery (such as fever, flu, or a cold).     · You need to reschedule or have changed your mind about having the surgery. Where can you learn more? Go to http://tayler-joan.info/. Enter Q270 in the search box to learn more about \"How to Prepare for Surgery. \"  Current as of: March 29, 2018  Content Version: 11.8  © 8869-8951 Healthwise, Incorporated. Care instructions adapted under license by [employer] (which disclaims liability or warranty for this information). If you have questions about a medical condition or this instruction, always ask your healthcare professional. [place] [address] any warranty or liability for your use of this information.

## 2019-01-04 RX ORDER — AMLODIPINE BESYLATE 5 MG/1
5 TABLET ORAL DAILY
Qty: 90 TAB | Refills: 1 | Status: SHIPPED | OUTPATIENT
Start: 2019-01-04 | End: 2019-02-15 | Stop reason: SDUPTHER

## 2019-02-15 RX ORDER — AMLODIPINE BESYLATE 5 MG/1
5 TABLET ORAL DAILY
Qty: 90 TAB | Refills: 1 | Status: SHIPPED | OUTPATIENT
Start: 2019-02-15 | End: 2019-05-29 | Stop reason: SDUPTHER

## 2019-04-15 NOTE — TELEPHONE ENCOUNTER
Last appt was 12-18-18  Next appt is 4-30-19    Last refill of this medication was 8-9-18 refilled by you

## 2019-04-17 RX ORDER — METOPROLOL TARTRATE 25 MG/1
25 TABLET, FILM COATED ORAL 2 TIMES DAILY
Qty: 60 TAB | Refills: 5 | Status: SHIPPED | OUTPATIENT
Start: 2019-04-17 | End: 2019-11-25 | Stop reason: SDUPTHER

## 2019-05-29 NOTE — TELEPHONE ENCOUNTER
Last appt was 12-18-18  Next appt is 6-14-19    Last refill of this medication was     2-15-19 Amlodipine 5 mg 90 with 1 refill

## 2019-05-30 RX ORDER — AMLODIPINE BESYLATE 5 MG/1
5 TABLET ORAL DAILY
Qty: 90 TAB | Refills: 1 | Status: SHIPPED | OUTPATIENT
Start: 2019-05-30 | End: 2019-08-08 | Stop reason: SDUPTHER

## 2019-08-12 RX ORDER — AMLODIPINE BESYLATE 5 MG/1
5 TABLET ORAL DAILY
Qty: 90 TAB | Refills: 1 | Status: SHIPPED | OUTPATIENT
Start: 2019-08-12 | End: 2021-01-01

## 2019-09-11 ENCOUNTER — OFFICE VISIT (OUTPATIENT)
Dept: FAMILY MEDICINE CLINIC | Age: 69
End: 2019-09-11

## 2019-09-11 VITALS
HEART RATE: 57 BPM | DIASTOLIC BLOOD PRESSURE: 58 MMHG | RESPIRATION RATE: 16 BRPM | TEMPERATURE: 97.2 F | OXYGEN SATURATION: 100 % | WEIGHT: 224 LBS | SYSTOLIC BLOOD PRESSURE: 108 MMHG | HEIGHT: 69 IN | BODY MASS INDEX: 33.18 KG/M2

## 2019-09-11 DIAGNOSIS — H61.23 BILATERAL IMPACTED CERUMEN: Primary | ICD-10-CM

## 2019-09-11 DIAGNOSIS — I48.0 PAROXYSMAL ATRIAL FIBRILLATION (HCC): ICD-10-CM

## 2019-09-11 DIAGNOSIS — C25.0 MALIGNANT NEOPLASM OF HEAD OF PANCREAS (HCC): ICD-10-CM

## 2019-09-11 RX ORDER — MULTIVIT WITH MINERALS/HERBS
1 TABLET ORAL DAILY
COMMUNITY

## 2019-09-11 RX ORDER — VITAMIN E CAP 100 UNIT 100 UNIT
CAP ORAL DAILY
COMMUNITY
End: 2020-05-05

## 2019-09-11 NOTE — PROGRESS NOTES
Joe Shin is a 71 y.o. male and presents with Diabetes; Hypertension; Obesity; Pancreatic Cancer (sees Oncology); and Ear Fullness (both ears)       Subjective:    Ear- feels \"stopped up\" - not currently- comes and goes. Hearing is reduced when it feels this way. About one day- uses peroxide and this helps. Pancreatic cancer- seeing oncology - Dr. Alex Luu- tumor is currently shrinking. Atrial fibrillation- on coumadin- follows in coumading clinic and sees cardiology. No cp or palpitations. Diabetes- type 2- follows with endocrinology- bs \"up and down\". Missed f/u- discussed importance. Assessment/Plan:    Ear- suspect cerumen impaction- discussed and tip sheet given. Pancreatic cancer- continue to f/u with Dr. Godfrey Moctezuma. Getting chemotherapy twice per month. Atrial fibrillation- stable- sees Dr. Sonya Noel   - pt refuses flu shot today  Diabetes- keep endocrine f/u- at [facility].- pt asked to be sure to get a drug formulary from insurance to bring to endo as novolog is expensive. Diagnoses and all orders for this visit:    1. Bilateral impacted cerumen    2. Malignant neoplasm of head of pancreas ([address]    3. Paroxysmal atrial fibrillation ([address]    4. Insulin dependent type 2 diabetes mellitus, uncontrolled ([address]        RTC in   Orders Placed This Encounter    vitamin e (E GEMS) 100 unit capsule     Sig: Take  by mouth daily.  b complex vitamins tablet     Sig: Take 1 Tab by mouth daily.  TURMERIC PO     Sig: Take 500 mg by mouth.  3 tablets daily               ROS:  Negative except as mentioned above  Cardiac-  Pulmonary-  GI-    SH:  Social History     Tobacco Use    Smoking status: Former Smoker     Packs/day: 1.00     Years: 42.00     Pack years: 42.00     Types: Cigarettes     Last attempt to quit: 2016     Years since quittin.9    Smokeless tobacco: Never Used   Substance Use Topics    Alcohol use: Not Currently    Drug use: Not Currently     Types: Cocaine, Marijuana Comment: Also, crack, Last used 10 years ago. Medications/Allergies:  Current Outpatient Medications on File Prior to Visit   Medication Sig Dispense Refill    vitamin e (E GEMS) 100 unit capsule Take  by mouth daily.  b complex vitamins tablet Take 1 Tab by mouth daily.  TURMERIC PO Take 500 mg by mouth. 3 tablets daily      amLODIPine (NORVASC) 5 mg tablet Take 1 Tab by mouth daily. 90 Tab 1    OTHER Omega Q plus max      metoprolol tartrate (LOPRESSOR) 25 mg tablet Take 1 Tab by mouth two (2) times a day. TAKE ONE TABLET BY MOUTH TWICE DAILY 60 Tab 5    MILK THISTLE PO Take 250 mg by mouth daily.  OTHER glyco-betic 2 tablet daily      multivit-min/folic/vit K/lycop (MEN'S DAILY FORMULA PO) Take  by mouth. For Novomer prostate health. 1 capsule 2 times a day      resveratrol 50 mg cap Take  by Mouth.  red yeast rice 600 mg tab Take  by Mouth Once a Day.  FOLIC ACID PO Take  by Mouth.  acetaminophen (TYLENOL) 500 mg tablet 500 mg.      ZINC PO Take  by mouth. Take 1 tablet by mouth daily.  insulin detemir (LEVEMIR FLEXTOUCH) 100 unit/mL (3 mL) inpn Inject 30 units subcutaneously in the evening (Patient taking differently: 40 Units nightly. Inject 30units subcutaneously in the evening) 15 mL 5    warfarin (COUMADIN) 3 mg tablet Take 1 Tab by mouth daily. (Patient taking differently: Take 3 mg by mouth daily. 2 tablets 4 days a week, 3 tablets 3 days a week) 10 Tab 0    Lancets [facility] Dispense Accu Check Multi-Clix Drum - use to check blood sugar bid or as directed - 90 day supply 100 Each 3    glucose blood VI test strips (BLOOD GLUCOSE TEST) strip Test strips of patient choice - use to check blood sugar bid or as directed 200 Strip 3    Cholecalciferol, Vitamin D3, (VITAMIN D3) 2,000 unit cap daily.  losartan (COZAAR) 100 mg tablet Take 100 mg by mouth daily.  insulin aspart (NOVOLOG FLEXPEN) 100 unit/mL inpn by SubCUTAneous route.  15 units subcutaneously 15 minutes before breakfast and 15units 15 minutes before lunch and 15units 15 minutes before dinner.  Insulin Needles, Disposable, (NOVOFINE 32) 32 gauge x 1/4\" ndle USE AS DIRECTED WITH LEVEMIR DAILY 100 Pen Needle 11    Blood-Glucose Meter (ACCU-CHEK BRY) misc by Does Not Apply route.  glucose blood VI test strips (ACCU-CHEK SMARTVIEW) strip by Does Not Apply route See Admin Instructions. No current facility-administered medications on file prior to visit. Allergies   Allergen Reactions    Iodinated Contrast Media Other (comments)    Iodine Other (comments)    Vasotec [Enalapril Maleate] Other (comments)     Thought to have possibly caused his pancreatitis       Objective:  Visit Vitals  /58   Pulse (!) 57   Temp 97.2 °F (36.2 °C) (Oral)   Resp 16   Ht 5' 9\" (1.753 m)   Wt 224 lb (101.6 kg)   SpO2 100%   BMI 33.08 kg/m²    Body mass index is 33.08 kg/m². Constitutional: Well developed, nourished, no distress, alert   HENT: Exterior ears and tympanic membranes normal bilaterally. Supple neck. No thyromegaly or lymphadenopathy. Oropharynx clear and moist mucous membranes. Eyes: Conjunctiva normal. PERRL. CV: S1, S2.  RRR. No murmurs/rubs. No thrills palpated. No carotid bruits. Intact distal pulses. No edema. Pulm: No abnormalities on inspection. Clear to auscultation bilaterally. No wheezing/rhonchi. Normal effort. GI: Soft, nontender, nondistended. Normal active bowel sounds. No  masses on palpation. No hepatosplenomegaly.

## 2019-09-11 NOTE — PROGRESS NOTES
1. Have you been to the ER, urgent care clinic since your last visit? Hospitalized since your last visit? No.     2. Have you seen or consulted any other health care providers outside of the [address] [place] [address] since your last visit? Include any pap smears or colon screening. Yes, went to see his Endocrinologist in 8/2019.      Chief Complaint   Patient presents with    Diabetes    Hypertension    Obesity    Pancreatic Cancer     sees Oncology    Ear Fullness     both ears

## 2019-09-11 NOTE — PATIENT INSTRUCTIONS
Earwax Blockage: Care Instructions  Your Care Instructions    Earwax is a natural substance that protects the ear canal. Normally, earwax drains from the ears and does not cause problems. Sometimes earwax builds up and hardens. Earwax blockage (also called cerumen impaction) can cause some loss of hearing and pain. When wax is tightly packed, you will need to have your doctor remove it. Follow-up care is a key part of your treatment and safety. Be sure to make and go to all appointments, and call your doctor if you are having problems. It's also a good idea to know your test results and keep a list of the medicines you take. How can you care for yourself at home? · Do not try to remove earwax with cotton swabs, fingers, or other objects. This can make the blockage worse and damage the eardrum. · If your doctor recommends that you try to remove earwax at home:  ? Soften and loosen the earwax with warm mineral oil. You also can try hydrogen peroxide mixed with an equal amount of room temperature water. Place 2 drops of the fluid, warmed to body temperature, in the ear two times a day for up to 5 days. ? Once the wax is loose and soft, all that is usually needed to remove it from the ear canal is a gentle, warm shower. Direct the water into the ear, then tip your head to let the earwax drain out. Dry your ear thoroughly with a hair dryer set on low. Hold the dryer several inches from your ear. ? If the warm mineral oil and shower do not work, use an over-the-counter wax softener. Read and follow all instructions on the label. After using the wax softener, use an ear syringe to gently flush the ear. Make sure the flushing solution is body temperature. Cool or hot fluids in the ear can cause dizziness. When should you call for help? Call your doctor now or seek immediate medical care if:    · Pus or blood drains from your ear.     · Your ears are ringing or feel full.     · You have a loss of hearing.  Watch closely for changes in your health, and be sure to contact your doctor if:    · You have pain or reduced hearing after 1 week of home treatment.     · You have any new symptoms, such as nausea or balance problems. Where can you learn more? Go to http://tayler-joan.info/. Enter H226 in the search box to learn more about \"Earwax Blockage: Care Instructions. \"  Current as of: September 23, 2018  Content Version: 12.1  © 8709-0808 Healthwise, Incorporated. Care instructions adapted under license by [employer] (which disclaims liability or warranty for this information). If you have questions about a medical condition or this instruction, always ask your healthcare professional. [address] any warranty or liability for your use of this information.

## 2019-10-30 ENCOUNTER — PATIENT OUTREACH (OUTPATIENT)
Dept: FAMILY MEDICINE CLINIC | Age: 69
End: 2019-10-30

## 2019-11-22 ENCOUNTER — PATIENT OUTREACH (OUTPATIENT)
Dept: FAMILY MEDICINE CLINIC | Age: 69
End: 2019-11-22

## 2019-11-22 NOTE — PROGRESS NOTES
.Date/Time:  11/22/2019 1:46 PM 
 
Method of communication with patient:phone Ambulatory Care Manager ( ACM) made out reach to  patient by telephone to offer Care Coordination Services ( CCS). Provided introduction to self, and explanation of the Ambulatory Care Manager's role. ACM had to leave a voicemail message for return to  anytime Monday thru Friday 08:30-5:00. Second CCS outreach attempt ( 10/30/2019)

## 2019-11-25 ENCOUNTER — PATIENT OUTREACH (OUTPATIENT)
Dept: FAMILY MEDICINE CLINIC | Age: 69
End: 2019-11-25

## 2019-11-25 PROBLEM — C15.9 MALIGNANT NEOPLASM OF ESOPHAGUS (HCC): Status: ACTIVE | Noted: 2019-11-25

## 2019-11-25 PROBLEM — C25.9 MALIGNANT NEOPLASM OF PANCREAS (HCC): Status: ACTIVE | Noted: 2019-01-10

## 2019-11-25 RX ORDER — METOPROLOL TARTRATE 25 MG/1
25 TABLET, FILM COATED ORAL 2 TIMES DAILY
Qty: 60 TAB | Refills: 5 | Status: SHIPPED | OUTPATIENT
Start: 2019-11-25 | End: 2020-04-29 | Stop reason: SDUPTHER

## 2019-11-25 NOTE — PROGRESS NOTES
. 
Complex Case Management Date/Time:  2019 10:19 AM 
 
Method of communication with patient:phone Ambulatory Care Manager [facility]) contacted the patient by telephone to perform Ambulatory Care Coordination. Verified name and  with patient as identifiers. Provided introduction to self, and explanation of the Ambulatory Care Manager's role. Reviewed most recent clinic visit w/ patient who verbalized understanding. Patient given an opportunity to ask questions. Top Challenges reviewed with the patient 1. Need medication refill ( Metoprolol 25 mg twice a day) 2. Has neurology appointment next week. 3. Informed that he may need labs on his next office visit The patient agrees to contact the PCP office or the [address] [place] [address] for questions related to their healthcare. ACM provided contact information for future reference. Disease Specific:   CHF Home Health Active: No 
 
DME Active: Yes, Chema Vasquez Barriers to care? None Advance Care Planning:  
Does patient have an Advance Directive:  not on file Medication(s):  
Medication reconciliation was performed with patient, who verbalizes understanding of administration of home medications. There were no barriers to obtaining medications identified at this time. Referral to Pharm D needed: no  
 
Current Outpatient Medications Medication Sig  
 vitamin e (E GEMS) 100 unit capsule Take  by mouth daily.  b complex vitamins tablet Take 1 Tab by mouth daily.  TURMERIC PO Take 500 mg by mouth. 3 tablets daily  amLODIPine (NORVASC) 5 mg tablet Take 1 Tab by mouth daily.  OTHER Omega Q plus max  metoprolol tartrate (LOPRESSOR) 25 mg tablet Take 1 Tab by mouth two (2) times a day. TAKE ONE TABLET BY MOUTH TWICE DAILY  MILK THISTLE PO Take 250 mg by mouth daily.  OTHER glyco-betic 2 tablet daily  multivit-min/folic/vit K/lycop (MEN'S DAILY FORMULA PO) Take  by mouth. For [employer] prostate health. 1 capsule 2 times a day  losartan (COZAAR) 100 mg tablet Take 100 mg by mouth daily.  resveratrol 50 mg cap Take  by Mouth.  red yeast rice 600 mg tab Take  by Mouth Once a Day.  FOLIC ACID PO Take  by Mouth.  acetaminophen (TYLENOL) 500 mg tablet 500 mg.  
 insulin aspart (NOVOLOG FLEXPEN) 100 unit/mL inpn by SubCUTAneous route. 15 units subcutaneously 15 minutes before breakfast and 15units 15 minutes before lunch and 15units 15 minutes before dinner.  ZINC PO Take  by mouth. Take 1 tablet by mouth daily.  insulin detemir (LEVEMIR FLEXTOUCH) 100 unit/mL (3 mL) inpn Inject 30 units subcutaneously in the evening (Patient taking differently: 40 Units nightly. Inject 30units subcutaneously in the evening)  warfarin (COUMADIN) 3 mg tablet Take 1 Tab by mouth daily. (Patient taking differently: Take 3 mg by mouth daily. 2 tablets 4 days a week, 3 tablets 3 days a week)  Insulin Needles, Disposable, (NOVOFINE 32) 32 gauge x 1/4\" ndle USE AS DIRECTED WITH LEVEMIR DAILY  Lancets [facility] Dispense Accu Check Multi-Clix Drum - use to check blood sugar bid or as directed - 90 day supply  glucose blood VI test strips (BLOOD GLUCOSE TEST) strip Test strips of patient choice - use to check blood sugar bid or as directed  Blood-Glucose Meter (ACCU-CHEK BRY) misc by Does Not Apply route.  glucose blood VI test strips (ACCU-CHEK SMARTVIEW) strip by Does Not Apply route See Admin Instructions.  Cholecalciferol, Vitamin D3, (VITAMIN D3) 2,000 unit cap daily. No current facility-administered medications for this visit. [facility] follow up appointment(s):  
Future Appointments Date Time Provider Stanton Ludy 12/18/2019 10:00 AM Yeimi Albert MD [address] [facility] 9500 [facility] follow up appointment(s): neurology Goals General  
  Attends follow up appointments on schedule  Demonstrate self-management skills  Follows diet recommendations and achieves/maintains goal weight  Independent self-management skills  Take all medications as ordered

## 2019-11-25 NOTE — TELEPHONE ENCOUNTER
Patient called [facility] requesting a refill for Metoprolol 25 mg take 1 twice a day . Last office visit 9/11/2019 ( No noted updated labs) Patient was informed on his next follow up he maybe asked to get lab work drawn.  He verbalized understanding

## 2019-12-09 ENCOUNTER — PATIENT OUTREACH (OUTPATIENT)
Dept: FAMILY MEDICINE CLINIC | Age: 69
End: 2019-12-09

## 2019-12-09 NOTE — PROGRESS NOTES
. 
Complex Case Management Date/Time:  2019 11:22 AM 
 
Method of communication with patient:phone Ambulatory Care Manager [facility]) contacted the patient by telephone to perform Ambulatory Care Coordination. Verified name and  with patient as identifiers. Provided introduction to self, and explanation of the Ambulatory Care Manager's role. Reviewed most recent clinic visit w/ patient who verbalized understanding. Patient given an opportunity to ask questions. He is doing well at as expected . Looking forward to [place]. The patient agrees to contact the PCP office or the [address] [place] [address] for questions related to their healthcare. Provided contact information for future reference. [facility] [place](s):  
Future Appointments Date Time Provider Stanton [place] 2019 10:00 AM Aracelis Christine MD [address] [facility] 0854  
  
  
 

none

## 2020-01-01 ENCOUNTER — HOSPITAL ENCOUNTER (OUTPATIENT)
Dept: CT IMAGING | Age: 70
Discharge: HOME | End: 2020-12-03
Payer: MEDICARE

## 2020-01-01 ENCOUNTER — OFFICE VISIT (OUTPATIENT)
Dept: FAMILY MEDICINE CLINIC | Age: 70
End: 2020-01-01
Payer: MEDICARE

## 2020-01-01 ENCOUNTER — VIRTUAL VISIT (OUTPATIENT)
Dept: FAMILY MEDICINE CLINIC | Age: 70
End: 2020-01-01

## 2020-01-01 ENCOUNTER — TRANSCRIBE ORDER (OUTPATIENT)
Dept: SCHEDULING | Age: 70
End: 2020-01-01

## 2020-01-01 ENCOUNTER — PATIENT OUTREACH (OUTPATIENT)
Dept: CASE MANAGEMENT | Age: 70
End: 2020-01-01

## 2020-01-01 ENCOUNTER — HOSPITAL ENCOUNTER (OUTPATIENT)
Dept: CT IMAGING | Age: 70
Discharge: HOME | End: 2020-07-27
Payer: MEDICARE

## 2020-01-01 ENCOUNTER — HOSPITAL ENCOUNTER (OUTPATIENT)
Dept: CT IMAGING | Age: 70
Discharge: HOME | End: 2020-10-28
Attending: SPECIALIST
Payer: MEDICARE

## 2020-01-01 ENCOUNTER — HOSPITAL ENCOUNTER (OUTPATIENT)
Dept: LAB | Age: 70
Discharge: HOME | End: 2020-09-14
Payer: MEDICARE

## 2020-01-01 VITALS
DIASTOLIC BLOOD PRESSURE: 62 MMHG | WEIGHT: 247 LBS | OXYGEN SATURATION: 100 % | TEMPERATURE: 97.4 F | HEART RATE: 76 BPM | SYSTOLIC BLOOD PRESSURE: 127 MMHG | HEIGHT: 69 IN | BODY MASS INDEX: 36.58 KG/M2 | RESPIRATION RATE: 12 BRPM

## 2020-01-01 DIAGNOSIS — R91.8 LUNG MASS: ICD-10-CM

## 2020-01-01 DIAGNOSIS — C25.0 PANCREATIC MALIGNANCY SYNDROME (HCC): ICD-10-CM

## 2020-01-01 DIAGNOSIS — H61.23 BILATERAL IMPACTED CERUMEN: Primary | ICD-10-CM

## 2020-01-01 DIAGNOSIS — R91.8 PULMONARY NODULES: Primary | ICD-10-CM

## 2020-01-01 DIAGNOSIS — R91.8 PULMONARY NODULES: ICD-10-CM

## 2020-01-01 DIAGNOSIS — N41.9 PROSTATITIS, UNSPECIFIED PROSTATITIS TYPE: ICD-10-CM

## 2020-01-01 DIAGNOSIS — C25.0 MALIGNANT NEOPLASM OF HEAD OF PANCREAS (HCC): ICD-10-CM

## 2020-01-01 DIAGNOSIS — E66.01 MORBID OBESITY DUE TO EXCESS CALORIES (HCC): ICD-10-CM

## 2020-01-01 DIAGNOSIS — I48.0 PAROXYSMAL ATRIAL FIBRILLATION (HCC): Primary | ICD-10-CM

## 2020-01-01 DIAGNOSIS — Z91.199 NO-SHOW FOR APPOINTMENT: Primary | ICD-10-CM

## 2020-01-01 DIAGNOSIS — C25.0 MALIGNANT NEOPLASM OF HEAD OF PANCREAS (HCC): Primary | ICD-10-CM

## 2020-01-01 LAB
ALBUMIN SERPL-MCNC: 2.5 G/DL (ref 3.4–5)
ALBUMIN/GLOB SERPL: 0.5 {RATIO} (ref 0.8–1.7)
ALP SERPL-CCNC: 92 U/L (ref 45–117)
ALT SERPL-CCNC: 32 U/L (ref 16–61)
ANION GAP SERPL CALC-SCNC: 7 MMOL/L (ref 3–18)
AST SERPL-CCNC: 29 U/L (ref 10–38)
BILIRUB SERPL-MCNC: 0.5 MG/DL (ref 0.2–1)
BUN SERPL-MCNC: 11 MG/DL (ref 7–18)
BUN/CREAT SERPL: 14 (ref 12–20)
CALCIUM SERPL-MCNC: 8.7 MG/DL (ref 8.5–10.1)
CHLORIDE SERPL-SCNC: 100 MMOL/L (ref 100–111)
CHOLEST SERPL-MCNC: 153 MG/DL
CO2 SERPL-SCNC: 26 MMOL/L (ref 21–32)
CREAT SERPL-MCNC: 0.81 MG/DL (ref 0.6–1.3)
CREAT UR-MCNC: 0.7 MG/DL (ref 0.6–1.3)
CREAT UR-MCNC: 0.8 MG/DL (ref 0.6–1.3)
CREAT UR-MCNC: 0.9 MG/DL (ref 0.6–1.3)
CREAT UR-MCNC: 222 MG/DL (ref 30–125)
GLOBULIN SER CALC-MCNC: 4.8 G/DL (ref 2–4)
GLUCOSE SERPL-MCNC: 175 MG/DL (ref 74–99)
HBA1C MFR BLD: 12.6 % (ref 4.2–5.6)
HDLC SERPL-MCNC: 59 MG/DL (ref 40–60)
HDLC SERPL: 2.6 {RATIO} (ref 0–5)
LDLC SERPL CALC-MCNC: 81 MG/DL (ref 0–100)
LIPID PROFILE,FLP: NORMAL
MICROALBUMIN UR-MCNC: 62.3 MG/DL (ref 0–3)
MICROALBUMIN/CREAT UR-RTO: 281 MG/G (ref 0–30)
POTASSIUM SERPL-SCNC: 4.7 MMOL/L (ref 3.5–5.5)
PROT SERPL-MCNC: 7.3 G/DL (ref 6.4–8.2)
SODIUM SERPL-SCNC: 133 MMOL/L (ref 136–145)
TRIGL SERPL-MCNC: 65 MG/DL (ref ?–150)
VLDLC SERPL CALC-MCNC: 13 MG/DL

## 2020-01-01 PROCEDURE — 74011000255 HC RX REV CODE- 255: Performed by: RADIOLOGY

## 2020-01-01 PROCEDURE — 82565 ASSAY OF CREATININE: CPT

## 2020-01-01 PROCEDURE — 74177 CT ABD & PELVIS W/CONTRAST: CPT

## 2020-01-01 PROCEDURE — 74011000636 HC RX REV CODE- 636: Performed by: RADIOLOGY

## 2020-01-01 PROCEDURE — G8536 NO DOC ELDER MAL SCRN: HCPCS | Performed by: NURSE PRACTITIONER

## 2020-01-01 PROCEDURE — 3017F COLORECTAL CA SCREEN DOC REV: CPT | Performed by: NURSE PRACTITIONER

## 2020-01-01 PROCEDURE — 83036 HEMOGLOBIN GLYCOSYLATED A1C: CPT

## 2020-01-01 PROCEDURE — G8754 DIAS BP LESS 90: HCPCS | Performed by: NURSE PRACTITIONER

## 2020-01-01 PROCEDURE — 82043 UR ALBUMIN QUANTITATIVE: CPT

## 2020-01-01 PROCEDURE — G0463 HOSPITAL OUTPT CLINIC VISIT: HCPCS | Performed by: NURSE PRACTITIONER

## 2020-01-01 PROCEDURE — 36415 COLL VENOUS BLD VENIPUNCTURE: CPT

## 2020-01-01 PROCEDURE — G8417 CALC BMI ABV UP PARAM F/U: HCPCS | Performed by: NURSE PRACTITIONER

## 2020-01-01 PROCEDURE — G8427 DOCREV CUR MEDS BY ELIG CLIN: HCPCS | Performed by: NURSE PRACTITIONER

## 2020-01-01 PROCEDURE — 80061 LIPID PANEL: CPT

## 2020-01-01 PROCEDURE — 74011636320 HC RX REV CODE- 636/320: Performed by: RADIOLOGY

## 2020-01-01 PROCEDURE — G8432 DEP SCR NOT DOC, RNG: HCPCS | Performed by: NURSE PRACTITIONER

## 2020-01-01 PROCEDURE — 99213 OFFICE O/P EST LOW 20 MIN: CPT | Performed by: NURSE PRACTITIONER

## 2020-01-01 PROCEDURE — 1101F PT FALLS ASSESS-DOCD LE1/YR: CPT | Performed by: NURSE PRACTITIONER

## 2020-01-01 PROCEDURE — 80053 COMPREHEN METABOLIC PANEL: CPT

## 2020-01-01 PROCEDURE — G8752 SYS BP LESS 140: HCPCS | Performed by: NURSE PRACTITIONER

## 2020-01-01 RX ORDER — BARIUM SULFATE 20 MG/ML
900 SUSPENSION ORAL
Status: COMPLETED | OUTPATIENT
Start: 2020-01-01 | End: 2020-01-01

## 2020-01-01 RX ORDER — BARIUM SULFATE 20 MG/ML
450 SUSPENSION ORAL
Status: COMPLETED | OUTPATIENT
Start: 2020-01-01 | End: 2020-01-01

## 2020-01-01 RX ORDER — METOPROLOL TARTRATE 25 MG/1
25 TABLET, FILM COATED ORAL 2 TIMES DAILY
Qty: 180 TAB | Refills: 2 | Status: SHIPPED | OUTPATIENT
Start: 2020-01-01 | End: 2021-01-01

## 2020-01-01 RX ADMIN — BARIUM SULFATE 900 ML: 20 SUSPENSION ORAL at 09:20

## 2020-01-01 RX ADMIN — BARIUM SULFATE 900 ML: 20 SUSPENSION ORAL at 09:18

## 2020-01-01 RX ADMIN — IOPAMIDOL 90 ML: 612 INJECTION, SOLUTION INTRAVENOUS at 09:48

## 2020-01-01 RX ADMIN — BARIUM SULFATE 900 ML: 20 SUSPENSION ORAL at 09:48

## 2020-01-01 RX ADMIN — IOPAMIDOL 100 ML: 612 INJECTION, SOLUTION INTRAVENOUS at 09:18

## 2020-01-01 RX ADMIN — IOPAMIDOL 90 ML: 612 INJECTION, SOLUTION INTRAVENOUS at 09:20

## 2020-01-14 ENCOUNTER — OFFICE VISIT (OUTPATIENT)
Dept: FAMILY MEDICINE CLINIC | Age: 70
End: 2020-01-14

## 2020-01-14 VITALS
BODY MASS INDEX: 34.51 KG/M2 | HEIGHT: 69 IN | RESPIRATION RATE: 16 BRPM | OXYGEN SATURATION: 95 % | TEMPERATURE: 97.6 F | SYSTOLIC BLOOD PRESSURE: 113 MMHG | WEIGHT: 233 LBS | DIASTOLIC BLOOD PRESSURE: 58 MMHG | HEART RATE: 69 BPM

## 2020-01-14 DIAGNOSIS — G62.0 DRUG-INDUCED POLYNEUROPATHY (HCC): ICD-10-CM

## 2020-01-14 DIAGNOSIS — Z13.31 SCREENING FOR DEPRESSION: ICD-10-CM

## 2020-01-14 DIAGNOSIS — Z13.39 SCREENING FOR ALCOHOLISM: ICD-10-CM

## 2020-01-14 DIAGNOSIS — Z00.00 MEDICARE ANNUAL WELLNESS VISIT, SUBSEQUENT: ICD-10-CM

## 2020-01-14 DIAGNOSIS — R26.9 GAIT DIFFICULTY: Primary | ICD-10-CM

## 2020-01-14 RX ORDER — LOSARTAN POTASSIUM 100 MG/1
100 TABLET ORAL DAILY
Qty: 30 TAB | Refills: 5 | Status: SHIPPED | OUTPATIENT
Start: 2020-01-14 | End: 2021-01-01

## 2020-01-14 RX ORDER — OMEGA-3-ACID ETHYL ESTERS 1 G/1
2 CAPSULE, LIQUID FILLED ORAL 2 TIMES DAILY
COMMUNITY
End: 2020-05-05

## 2020-01-14 NOTE — PATIENT INSTRUCTIONS
Medicare Wellness Visit, Male    The best way to live healthy is to have a lifestyle where you eat a well-balanced diet, exercise regularly, limit alcohol use, and quit all forms of tobacco/nicotine, if applicable. Regular preventive services are another way to keep healthy. Preventive services (vaccines, screening tests, monitoring & exams) can help personalize your care plan, which helps you manage your own care. Screening tests can find health problems at the earliest stages, when they are easiest to treat. [facility] follows the current, evidence-based guidelines published by the [facility] Raghav Claudette ([facility]) when recommending preventive services for our patients. Because we follow these guidelines, sometimes recommendations change over time as research supports it. (For example, a prostate screening blood test is no longer routinely recommended for men with no symptoms). Of course, you and your doctor may decide to screen more often for some diseases, based on your risk and co-morbidities (chronic disease you are already diagnosed with). Preventive services for you include:  - Medicare offers their members a free annual wellness visit, which is time for you and your primary care provider to discuss and plan for your preventive service needs. Take advantage of this benefit every year!  -All adults over age 72 should receive the recommended pneumonia vaccines. Current USPSTF guidelines recommend a series of two vaccines for the best pneumonia protection.   -All adults should have a flu vaccine yearly and tetanus vaccine every 10 years.  -All adults age 48 and older should receive the shingles vaccines (series of two vaccines).        -All adults age 38-68 who are overweight should have a diabetes screening test once every three years.   -Other screening tests & preventive services for persons with diabetes include: an eye exam to screen for diabetic retinopathy, a kidney function test, a foot exam, and stricter control over your cholesterol.   -Cardiovascular screening for adults with routine risk involves an electrocardiogram (ECG) at intervals determined by the provider.   -Colorectal cancer screening should be done for adults age 54-65 with no increased risk factors for colorectal cancer. There are a number of acceptable methods of screening for this type of cancer. Each test has its own benefits and drawbacks. Discuss with your provider what is most appropriate for you during your annual wellness visit. The different tests include: colonoscopy (considered the best screening method), a fecal occult blood test, a fecal DNA test, and sigmoidoscopy.  -All adults born between [facility] should be screened once for Hepatitis C.  -An Abdominal Aortic Aneurysm (AAA) Screening is recommended for men age 73-68 who has ever smoked in their lifetime. Here is a list of your current Health Maintenance items (your personalized list of preventive services) with a due date:  Health Maintenance Due   Topic Date Due    Shingles Vaccine (1 of 2) 05/16/2000    AAA Screening  05/16/2015    Pneumococcal Vaccine (1 of 1 - PPSV23) 05/16/2015    Diabetic Foot Care  10/14/2016    PSA blood test  10/10/2018    Annual Well Visit  12/19/2018    Albumin Urine Test  04/09/2019    Cholesterol Test   04/09/2019    Hemoglobin A1C    07/02/2019    Flu Vaccine  08/01/2019    Colonoscopy  01/13/2020        Well Visit, Over 72: Care Instructions  Your Care Instructions    Physical exams can help you stay healthy. Your doctor has checked your overall health and may have suggested ways to take good care of yourself. He or she also may have recommended tests. At home, you can help prevent illness with healthy eating, regular exercise, and other steps. Follow-up care is a key part of your treatment and safety.  Be sure to make and go to all appointments, and call your doctor if you are having problems. It's also a good idea to know your test results and keep a list of the medicines you take. How can you care for yourself at home? · Reach and stay at a healthy weight. This will lower your risk for many problems, such as obesity, diabetes, heart disease, and high blood pressure. · Get at least 30 minutes of exercise on most days of the week. Walking is a good choice. You also may want to do other activities, such as running, swimming, cycling, or playing tennis or team sports. · Do not smoke. Smoking can make health problems worse. If you need help quitting, talk to your doctor about stop-smoking programs and medicines. These can increase your chances of quitting for good. · Protect your skin from too much sun. When you're outdoors from 10 a.m. to 4 p.m., stay in the shade or cover up with clothing and a hat with a wide brim. Wear sunglasses that block UV rays. Even when it's cloudy, put broad-spectrum sunscreen (SPF 30 or higher) on any exposed skin. · See a dentist one or two times a year for checkups and to have your teeth cleaned. · Wear a seat belt in the car. Follow your doctor's advice about when to have certain tests. These tests can spot problems early. For men and women  · Cholesterol. Your doctor will tell you how often to have this done based on your overall health and other things that can increase your risk for heart attack and stroke. · Blood pressure. Have your blood pressure checked during a routine doctor visit. Your doctor will tell you how often to check your blood pressure based on your age, your blood pressure results, and other factors. · Diabetes. Ask your doctor whether you should have tests for diabetes. · Vision. Experts recommend that you have yearly exams for glaucoma and other age-related eye problems. · Hearing. Tell your doctor if you notice any change in your hearing. You can have tests to find out how well you hear. · Colon cancer tests.  Keep having colon cancer tests as your doctor recommends. You can have one of several types of tests. · Heart attack and stroke risk. At least every 4 to 6 years, you should have your risk for heart attack and stroke assessed. Your doctor uses factors such as your age, blood pressure, cholesterol, and whether you smoke or have diabetes to show what your risk for a heart attack or stroke is over the next 10 years. · Osteoporosis. Talk to your doctor about whether you should have a bone density test to find out whether you have thinning bones. Also ask your doctor about whether you should take calcium and vitamin D supplements. For women  · Pap test and pelvic exam. You may no longer need a Pap test. Talk with your doctor about whether to stop or continue to have Pap tests. · Breast exam and mammogram. Ask how often you should have a mammogram, which is an X-ray of your breasts. A mammogram can spot breast cancer before it can be felt and when it is easiest to treat. · Thyroid disease. Talk to your doctor about whether to have your thyroid checked as part of a regular physical exam. Women have an increased chance of a thyroid problem. For men  · Prostate exam. Talk to your doctor about whether you should have a blood test (called a PSA test) for prostate cancer. Experts recommend that you discuss the benefits and risks of the test with your doctor before you decide whether to have this test. Some experts say that men ages 79 and older no longer need testing. · Abdominal aortic aneurysm. Ask your doctor whether you should have a test to check for an aneurysm. You may need a test if you ever smoked or if your parent, brother, sister, or child has had an aneurysm. When should you call for help? Watch closely for changes in your health, and be sure to contact your doctor if you have any problems or symptoms that concern you. Where can you learn more? Go to http://tayler-joan.info/.   Enter P154 in the search box to learn more about \"Well Visit, Over 65: Care Instructions. \"  Current as of: December 13, 2018  Content Version: 12.2  © 2878-0530 [employer]. Care instructions adapted under license by [employer] (which disclaims liability or warranty for this information). If you have questions about a medical condition or this instruction, always ask your healthcare professional. [place] [address] any warranty or liability for your use of this information.

## 2020-01-14 NOTE — ACP (ADVANCE CARE PLANNING)
Advance Care Planning (ACP) Provider Conversation Snapshot    Date of ACP Conversation: 01/14/20  Persons included in Conversation:  patient  Length of ACP Conversation in minutes:  <16 minutes (Non-Billable)    Authorized Decision Maker (if patient is incapable of making informed decisions): This person is: Other Legally Authorized Decision Maker (e.g. Next of Kin)            For Patients with Decision Making Capacity:   Values/Goals: Exploration of values, goals, and preferences if recovery is not expected, even with continued medical treatment in the event of:  Imminent death  Severe, permanent brain injury  . \"In these circumstances, what matters most to you? \"  Care focused more on comfort or quality of life.   Alonso Lopes Outcomes / Follow-Up Plan:   Recommended completion of Advance Directive form after review of ACP materials and conversation with prospective healthcare agent

## 2020-01-14 NOTE — PROGRESS NOTES
This is the Subsequent Medicare Annual Wellness Exam, performed 12 months or more after the Initial AWV or the last Subsequent AWV    I have reviewed the patient's medical history in detail and updated the computerized patient record. History     Patient Active Problem List   Diagnosis Code    Hypertension I10    Hyperlipidemia E78.5    Vitamin D deficiency E55.9    Sleep apnea G47.30    Stasis dermatitis I87.2    Internal hemorrhoid K64.8    Microalbuminuria R80.9    Insulin dependent type 2 diabetes mellitus, uncontrolled (HCC) E11.65, Z79.4    Testicular pain N50.819    DDD (degenerative disc disease), lumbar M51.36    Paroxysmal atrial fibrillation (HCC) I48.0    Acute diastolic CHF (congestive heart failure) (HCC) I50.31    Nocturia R35.1    Anemia D64.9    Severe obesity (BMI 35.0-39. 9) with comorbidity ([facility] E66.01    Former smoker Z87.891    History of acute pancreatitis Z87.19    Pancreatic pseudocyst K86.3    Idiopathic acute pancreatitis without infection or necrosis K85.00    Right kidney stone N20.0    Acute kidney injury ([facility] N17.9    Lumbago M54.5    Morbid obesity due to excess calories (HCC) E66.01    Pancreatitis K85.90    Type 2 diabetes mellitus without complication (HCC) G67.5    Pancreatic duct dilated K86.89    Malignant neoplasm of head of pancreas (HCC) C25.0    Malignant neoplasm of esophagus (HCC) C15.9    Malignant neoplasm of pancreas (HCC) C25.9     Past Medical History:   Diagnosis Date    BPH with obstruction/lower urinary tract symptoms     Colon polyps     Dr. Allyson Leigh DDD (degenerative disc disease), lumbar 9/15    Hyperlipidemia     Hypertension     Internal hemorrhoid     Microalbuminuria     Nephrolithiasis     Nocturia     Pancreatic cancer (HCC)     Stage IV, [facility]     Sleep apnea     has CPAP - Dr. Evelio Villalta Stasis dermatitis     Testicular pain, left     Tobacco abuse     Urinary frequency     Vitamin D deficiency       Past Surgical History:   Procedure Laterality Date    HX CATARACT REMOVAL      x2    HX COLONOSCOPY  01/13/2015    Dr. Janneth Tsai - repeat 1/20    HX CYST REMOVAL      Back      Current Outpatient Medications   Medication Sig Dispense Refill    organ concentrates (PROSTATE PO) Take  by mouth. 2 capsules daily      omega-3/dha/epa/fish oil (OMEGA-3 FISH OIL PO) Take  by mouth. 2 capsules daily      metoprolol tartrate (LOPRESSOR) 25 mg tablet Take 1 Tab by mouth two (2) times a day. TAKE ONE TABLET BY MOUTH TWICE DAILY 60 Tab 5    vitamin e (E GEMS) 100 unit capsule Take  by mouth daily.  b complex vitamins tablet Take 1 Tab by mouth daily.  TURMERIC PO Take 500 mg by mouth. 3 tablets daily      amLODIPine (NORVASC) 5 mg tablet Take 1 Tab by mouth daily. 90 Tab 1    OTHER Omega Q plus max      MILK THISTLE PO Take 250 mg by mouth daily.  OTHER glyco-betic 2 tablet daily      multivit-min/folic/vit K/lycop (MEN'S DAILY FORMULA PO) Take  by mouth. For "[employer] prostate health. 1 capsule 2 times a day      losartan (COZAAR) 100 mg tablet Take 100 mg by mouth daily.  resveratrol 50 mg cap Take  by Mouth.  red yeast rice 600 mg tab Take  by Mouth Once a Day.  FOLIC ACID PO Take  by Mouth.  acetaminophen (TYLENOL) 500 mg tablet 500 mg.      insulin aspart (NOVOLOG FLEXPEN) 100 unit/mL inpn 10-15 Units by SubCUTAneous route. 15 units subcutaneously 15 minutes before breakfast and 15units 15 minutes before lunch and 15units 15 minutes before dinner.  ZINC PO Take  by mouth. Take 1 tablet by mouth daily.  insulin detemir (LEVEMIR FLEXTOUCH) 100 unit/mL (3 mL) inpn Inject 30 units subcutaneously in the evening (Patient taking differently: 40-45 Units nightly. 40-45units at  night) 15 mL 5    warfarin (COUMADIN) 3 mg tablet Take 1 Tab by mouth daily. (Patient taking differently: Take 3 mg by mouth daily.  3 tablets 6 days a week, 2 tablets 1 day a week) 10 Tab 0    Insulin Needles, Disposable, (NOVOFINE 32) 32 gauge x 1/4\" ndle USE AS DIRECTED WITH LEVEMIR DAILY 100 Pen Needle 11    Lancets misc Dispense Accu Check Multi-Clix Drum - use to check blood sugar bid or as directed - 90 day supply 100 Each 3    glucose blood VI test strips (BLOOD GLUCOSE TEST) strip Test strips of patient choice - use to check blood sugar bid or as directed 200 Strip 3    Blood-Glucose Meter (ACCU-CHEK BRY) misc by Does Not Apply route.  glucose blood VI test strips (ACCU-CHEK SMARTVIEW) strip by Does Not Apply route See Admin Instructions.  Cholecalciferol, Vitamin D3, (VITAMIN D3) 2,000 unit cap daily.  omega-3 acid ethyl esters (LOVAZA) 1 gram capsule Take 2 g by mouth two (2) times a day. Allergies   Allergen Reactions    Iodinated Contrast Media Other (comments)    Iodine Other (comments)    Vasotec [Enalapril Maleate] Other (comments)     Thought to have possibly caused his pancreatitis       Family History   Problem Relation Age of Onset   José Hopkins Cancer Mother         esophageal    Cancer Father         lung    Diabetes Maternal Grandmother      Social History     Tobacco Use    Smoking status: Former Smoker     Packs/day: 1.00     Years: 42.00     Pack years: 42.00     Types: Cigarettes     Last attempt to quit: 9/22/2016     Years since quitting: 3.3    Smokeless tobacco: Never Used   Substance Use Topics    Alcohol use: Not Currently       Depression Risk Factor Screening:     3 most recent PHQ Screens 1/14/2020   Little interest or pleasure in doing things Not at all   Feeling down, depressed, irritable, or hopeless Not at all   Total Score PHQ 2 0       Alcohol Risk Factor Screening (MALE > 65):    Do you average more 1 drink per night or more than 7 drinks a week: No    In the past three months have you have had more than 4 drinks containing alcohol on one occasion: No      Functional Ability and Level of Safety:     Hearing: Hearing is good.    Activities of Daily Living: The home contains: no safety equipment. 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 Fall Risk:  Fall Risk Assessment, last 12 mths 1/14/2020   Able to walk? Yes   Fall in past 12 months? No   Fall with injury? -   Number of falls in past 12 months -   Fall Risk Score -       Abuse Screen:  Patient is not abused    Cognitive Screening   Has your family/caregiver stated any concerns about your memory: no  Cognitive Screening: Normal - Clock Drawing Test    Patient Care Team   Patient Care Team:  Joya Isbell MD as PCP - General (Internal Medicine)  Joya Isbell MD as PCP - [facility]  Tabatha Dawn DPM (Podiatry)  Yannick Asif MD (Gastroenterology)  Johnson Zavaleta MD (Sleep Medicine)  Antonia De Leon MD (Ophthalmology)  Ricco Castaneda MD (Hematology and Oncology)  Hillman Riedel, MD (Cardiology)  Tabatha Dawn DPM (Podiatry)  Jeanie Hubbard MD (Gastroenterology)  Antonia De Leon MD (Ophthalmology)  Gurmeet Alcaraz MD (Endocrinology)  Priyanka Carmichael, YURIDIA as Care Transitions Nurse  Joanette Opitz, RN as Ambulatory Care Manager    Assessment/Plan   Education and counseling provided:  Are appropriate based on today's review and evaluation  End-of-Life planning (with patient's consent)    Diagnoses and all orders for this visit:    1. Medicare annual wellness visit, subsequent    2. Screening for alcoholism    3. Screening for depression    Other orders  -     losartan (COZAAR) 100 mg tablet; Take 1 Tab by mouth daily.         Health Maintenance Due   Topic Date Due    Shingrix Vaccine Age 49> (1 of 2) 05/16/2000    AAA Screening 73-67 YO Male Smoking Patients  05/16/2015    Pneumococcal 65+ years (1 of 1 - PPSV23) 05/16/2015    FOOT EXAM Q1  10/14/2016    Prostate-Specific Antigen  10/10/2018    MEDICARE YEARLY EXAM  12/19/2018    MICROALBUMIN Q1  04/09/2019    LIPID PANEL Q1  04/09/2019    HEMOGLOBIN A1C Q6M  07/02/2019    Influenza Age 9 to Adult  08/01/2019    COLONOSCOPY  01/13/2020

## 2020-01-14 NOTE — PROGRESS NOTES
1. Have you been to the ER, urgent care clinic since your last visit? Hospitalized since your last visit? No.     2. Have you seen or consulted any other health care providers outside of the [address] [place] [address] since your last visit? Include any pap smears or colon screening. Yes, saw his Oncologist on 1/8/2020.      Chief Complaint   Patient presents with   [facility] Wellness Visit

## 2020-01-24 ENCOUNTER — HOSPITAL ENCOUNTER (OUTPATIENT)
Dept: PHYSICAL THERAPY | Age: 70
Discharge: HOME | End: 2020-01-24
Payer: MEDICARE

## 2020-01-24 PROCEDURE — 97162 PT EVAL MOD COMPLEX 30 MIN: CPT

## 2020-01-24 PROCEDURE — 97112 NEUROMUSCULAR REEDUCATION: CPT

## 2020-01-24 NOTE — PROGRESS NOTES
[facility] PHYSICAL THERAPY AT [address], [address], [place] [address] - Phone: [phone]  Fax: [phone] PLAN OF CARE / STATEMENT OF MEDICAL NECESSITY FOR PHYSICAL THERAPY SERVICES Patient Name: Michelle Brown : 1950 Medical  
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  
The Plan of Care and following information is based on the information from the initial evaluation.   
=========================================================================================== Assessment / key information:  Patient is 71 y.o. 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 Patient reports imbalance described as pitching forward, difficulty walking and numbness in his hands and feet. He is presently undergoing chemotherapy for Stage 4 pancreatic cancer. Patient reports that numbness began about 3 months ago. He denies dizziness or falls. Upon objective evaluation, patient demonstrates Impaired use of somatosensory and vestibular input and hip/stepping balance strategies. Patient scored 18/24 on Dynamic gait index indicating decreased  postural stability with ambulation. Lower extremity muscle strength was impaired for bilateral hip and knee. Patient scored 66 on FOTO indcating decreased quality of life. Patient can benefit from PT interventions to decrease r/o fall, improve safety with ADLs, & decrease sx with ADLs & to improve overall functional status. 
================================================================================== Eval Complexity: History: HIGH Complexity :3+ comorbidities / personal factors will impact the outcome/ POC Exam:HIGH Complexity : 4+ Standardized tests and measures addressing body structure, function, activity limitation and / or participation in recreation  Presentation: MEDIUM Complexity : Evolving with changing characteristics  Clinical Decision Making:MEDIUM Complexity : FOTO score of 26-74Overall Complexity:MEDIUM Problem List: decrease strength, impaired gait/ balance, decrease ADL/ functional abilitiies, decrease activity tolerance, decrease flexibility/ joint mobility and decrease transfer abilities Treatment Plan may include any combination of the following: Therapeutic exercise, Therapeutic activities, Neuromuscular re-education, Physical agent/modality, Gait/balance training, Manual therapy and Patient education Patient / Family readiness to learn indicated by: asking questions, trying to perform skills and interest 
Persons(s) to be included in education: patient (P) Barriers to Learning/Limitations: None Measures taken:   
Patient Goal (s): Better balance and strength Patient self reported health status: good Rehabilitation Potential: good ? Short Term Goals: To be accomplished in  4  Weeks: 
1) Patient performing daily HEP and educated in fall prevention techniques. 2) Patient will be able to maintain modified sharpened romberg heel to bunion for 30 seconds eyes open to increase safety in challenging environments. 3) Patient to improve score on DGI to 20/24 indicating decreased fall risk with ambulation. 4) Increase bilateral hip and hamstring strength to 4/5 to facilitate walking. ? Long Term Goals: To be accomplished in  8  Weeks: 
1) Patient to improve score on FOTO to 78 indicating improved quality of life. 2) Patient to improve score on DGI to 22/24 indicating decreased fall risk with ambulation. 3) Patient to be independent  with HEP. 4) Increase bilateral hip and hamstring strength to 4+/5 to facilitate walking. 5) Patient able to maintain foam stand eyes closed 30 seconds to increase safety in challenging environments. Frequency / Duration:   Patient to be seen  1-2  times per week for 8  weeks: 
Patient / Caregiver education and instruction: activity modification, exercises and other fall prevention G-Codes (GP): [email or web address] Therapist Signature: Leonardo Soto PT Date: 1/24/2020 Certification Period: 1/24/2020 to 4/23/2020 Time: 12:12 PM  
=========================================================================================== I certify that the above Physical Therapy Services are being furnished while the patient is under my care. I agree with the treatment plan and certify that this therapy is necessary. Physician Signature:        Date:       Time:    
Please sign and return to [facility] or you may fax the signed copy to [phone]. Thank you.

## 2020-01-24 NOTE — PROGRESS NOTES
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 NO    If yes, update Summary List  
Subjective Functional Status/Changes:  []  No changes reported See medical history OBJECTIVE 15 min Patient Education:  YES  Reviewed HEP []  Progressed/Changed HEP based on:  Pt educated in vestibular system function and dysfunction, use of senses for balance and central compensation. Reviewed test results and VSE seated for HEP. Other Objective/Functional Measures: 
 
See POC Post Treatment Pain Level (on 0 to 10) scale:   0  / 10 ASSESSMENT Assessment/Changes in Function:  
 
Justification for Eval Code Complexity: 
Patient History : See POC Examination see exam  
Clinical Presentation: evolving Clinical Decision Making : FOTO : 77 /100 []  See Progress Note/Recertification Patient will continue to benefit from skilled PT services to modify and progress therapeutic interventions, address functional mobility deficits, address strength deficits, analyze and cue movement patterns, analyze and modify body mechanics/ergonomics, address imbalance/dizziness and instruct in home and community integration to attain remaining goals. Progress toward goals / Updated goals: 
 
Initial evaluation completed with home exercise program and education initiated. PLAN 
[x]  Upgrade activities as tolerated YES Continue plan of care  
[]  Discharge due to :   
[]  Other:   
 
Therapist: Jason Brewer PT Date: 1/24/2020 Time: 11:10 AM  
 
Future Appointments Date Time Provider Stanton Boston 2/20/2020 11:45 AM MD AGGIE Contreras  
3/6/2020  9:45 AM Nikko Knox MD [address] [facility] [address]

## 2020-02-04 ENCOUNTER — PATIENT OUTREACH (OUTPATIENT)
Dept: FAMILY MEDICINE CLINIC | Age: 70
End: 2020-02-04

## 2020-02-04 NOTE — PROGRESS NOTES
.Date/Time:  2/4/2020 3:13 PM 
 
Method of communication with patient:phone Ambulatory Care Manager ( ACM) made out reach to  patient by telephone to offer Care Coordination Services ( CCS). Provided introduction to self, and explanation of the Ambulatory Care Manager's role. ACM had to leave a voicemail message for return to  anytime Monday thru Friday 08:30-5:00.

## 2020-02-28 NOTE — PROGRESS NOTES
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  
Visits from [facility]: 1 Missed Visits: 0 Goal/Measure of Progress Goal Met? 1. Patient to improve score on FOTO to 78 indicating  improved function and quality of life. Peggyann Gang Status at last Eval: 77 Current Status: Unable to reassess no 2. Patient to improve score on DGI to 22/24 indicating decreased fall risk with ambulation. Status at last Eval: 18 Current Status: Unable to reassess no 3. Patient to be independent with HEP. Status at last Eval: na Current Status: Unable to reassess no 4. Increase bilateral hip strength to 4+/5 to facilitate walking. Status at last Eval: 3-/5 to 3+/5 Current Status: Unable to reassess no Key Functional Changes/Progress: Patient only seen for initial evaluation then did not return to PT secondary to insurance issue. G-Codes (GP): na 
Assessments/Recommendations: Discontinue therapy due to lack of attendance or compliance. If you have any questions/comments please contact us directly at  [phone]. Thank you for allowing us to assist in the care of your patient. Therapist Signature: Sabine Monroe PT Date: 2020 Reporting Period: 2020 to 2020 Time: 10:08 AM

## 2020-04-15 ENCOUNTER — TELEPHONE (OUTPATIENT)
Dept: OTHER | Age: 70
End: 2020-04-15

## 2020-04-15 NOTE — TELEPHONE ENCOUNTER
Mine Hobbs was contacted to activate their [employer] account. Spoke with patient and offered to assist with account setup. Patient declined to sign up at this time. Patient reports he does not have the technology needed for [employer] activation, no smartphone/e-mail. Patient declined information about an Advance Directive at this time.      Ilona Scheuermann, LPN

## 2020-04-27 ENCOUNTER — HOSPITAL ENCOUNTER (OUTPATIENT)
Dept: CT IMAGING | Age: 70
Discharge: HOME | End: 2020-04-27
Attending: SPECIALIST
Payer: MEDICARE

## 2020-04-27 DIAGNOSIS — C25.0 MALIGNANT NEOPLASM OF HEAD OF PANCREAS (HCC): ICD-10-CM

## 2020-04-27 LAB — CREAT UR-MCNC: 0.8 MG/DL (ref 0.6–1.3)

## 2020-04-27 PROCEDURE — 74011000255 HC RX REV CODE- 255: Performed by: RADIOLOGY

## 2020-04-27 PROCEDURE — 82565 ASSAY OF CREATININE: CPT

## 2020-04-27 PROCEDURE — 74177 CT ABD & PELVIS W/CONTRAST: CPT

## 2020-04-27 PROCEDURE — 74011636320 HC RX REV CODE- 636/320: Performed by: RADIOLOGY

## 2020-04-27 RX ORDER — BARIUM SULFATE 20 MG/ML
900 SUSPENSION ORAL
Status: COMPLETED | OUTPATIENT
Start: 2020-04-27 | End: 2020-04-27

## 2020-04-27 RX ADMIN — IOPAMIDOL 100 ML: 612 INJECTION, SOLUTION INTRAVENOUS at 10:33

## 2020-04-27 RX ADMIN — BARIUM SULFATE 900 ML: 20 SUSPENSION ORAL at 10:33

## 2020-04-29 RX ORDER — METOPROLOL TARTRATE 25 MG/1
25 TABLET, FILM COATED ORAL 2 TIMES DAILY
Qty: 180 TAB | Refills: 2 | Status: SHIPPED | OUTPATIENT
Start: 2020-04-29 | End: 2020-01-01 | Stop reason: SDUPTHER

## 2020-04-29 NOTE — TELEPHONE ENCOUNTER
Last appt was: 1/14/2020  Next appt is:  5/14/2020. Patient prefers to use [facility] pharmacy 90 day supply.

## 2020-08-25 PROBLEM — N41.9 PROSTATITIS: Status: ACTIVE | Noted: 2020-01-01

## 2020-08-25 NOTE — PROGRESS NOTES
1. Have you been to the ER, urgent care clinic since your last visit? Hospitalized since your last visit? Yes, went to [place] [address] ED for Constipation on 8/19/2020. 
 
2. Have you seen or consulted any other health care providers outside of the [address] [place] [address] since your last visit? Include any pap smears or colon screening. No. 
 
 
Chief Complaint Patient presents with  Follow Up Chronic Condition  Irregular Heart Beat  
  see's Dr. Penn and Goes to Coumadin Clinic  Diabetes  
  goes to [facility].  Pancreatic Cancer  
  see's Dr. Ismael Hathaway.

## 2020-08-25 NOTE — PROGRESS NOTES
Rajendra Sears is a 79 y.o. male who was seen by synchronous (real-time) audio-video technology on 8/25/2020 for Follow Up Chronic Condition; Irregular Heart Beat (see's Dr. Daniela Graff and Goes to Coumadin Clinic); Diabetes (goes to [facility]. ); and Pancreatic Cancer (see's Dr. Marina Turner.) Assessment & Plan:  
Diagnoses and all orders for this visit: 1. Paroxysmal atrial fibrillation (HCC) Comments: 
follows with Dr. Daniela Graff. 
 
2. Malignant neoplasm of head of pancreas ([facility] Comments: 
follows with Dr. Marina Turner. 3. Insulin dependent type 2 diabetes mellitus, uncontrolled ([facility] [address] 4. Morbid obesity due to excess calories ([facility] [address] 5. Prostatitis, unspecified prostatitis type Comments: 
follow with Dr. Kenneth Waite Pancreatic cancer- continue to f/u with Dr. Marina Turner. Getting chemotherapy twice per month. Atrial fibrillation- stable- sees Dr. Daniela Graff. No new testing. On coumadin - gets followed at coumadin clinic. HM- pt had flu shot and pneumovax today. Diabetes- keep endocrine f/u- at [facility].- pt asked to be sure to get a drug formulary from insurance to bring to [facility] as novolog is expensive. Prostatitis -seeing urology- asked to keep f/u  There- sees dr. Kenneth Waite. Seeing GI- Dr. Maude Brown- has colonoscopy planned soon. Obesity-wt loss to bmi under 25 if possible. rtc in 6 months. 35 20 43 Subjective:  
  
Pancreatic cancer- seeing oncology - Dr. Dominic Agudelo- tumor is currently shrinking. Atrial fibrillation- on coumadin- follows in coumading clinic and sees cardiology. No cp or palpitations. Diabetes- type 2- follows with endocrinology- bs \"up and down\". seeing new physician at [facility]. Obesity- working on weight. Prostatitis- on tamsulosin and following with urology. Still with some frequency but no fever or dysuria. Missed f/u again- discussed importance with pt.  
  
 
  
 
Prior to Admission medications Medication Sig Start Date End Date Taking? Authorizing Provider gabapentin (NEURONTIN) 300 mg capsule Take 300 mg by mouth as needed. 4/29/20  Yes Provider, Historical  
lidocaine-prilocaine (EMLA) topical cream APPLY 1 2 INCH OF CREAM TO PORT SITE 30 40 MINUTES PRIOR TO PORT USE 3/14/20  Yes Provider, Historical  
Basaglar KwikPen U-100 Insulin 100 unit/mL (3 mL) inpn INJECT 50 UNITS SUBCUTANEOUSLY ONCE DAILY OR AS DIRECTED 4/27/20  Yes Provider, Historical  
metoprolol tartrate (LOPRESSOR) 25 mg tablet Take 1 Tab by mouth two (2) times a day. TAKE ONE TABLET BY MOUTH TWICE DAILY 4/29/20  Yes Dina Bolton MD  
TURMERIC PO Take 500 mg by mouth. 1 tablet daily   Yes Provider, Historical  
amLODIPine (NORVASC) 5 mg tablet Take 1 Tab by mouth daily. 8/12/19  Yes Dina Bolton MD  
MILK THISTLE PO Take 250 mg by mouth daily. Yes Provider, Historical  
multivit-min/folic/vit K/lycop (MEN'S DAILY FORMULA PO) Take  by mouth. For optimas prostate health. 1 capsule 2 times a day   Yes Provider, Historical  
resveratrol 50 mg cap 100 mg daily. Yes Provider, Historical  
acetaminophen (TYLENOL) 500 mg tablet 500 mg every six (6) hours as needed. Yes Provider, Historical  
insulin aspart (NOVOLOG FLEXPEN) 100 unit/mL inpn 10-15 Units by SubCUTAneous route. 15 units subcutaneously 15 minutes before breakfast and 15units 15 minutes before lunch and 15units 15 minutes before dinner. Yes Provider, Historical  
warfarin (COUMADIN) 3 mg tablet Take 1 Tab by mouth daily. Patient taking differently: Take 3 mg by mouth daily. 3 tablets 4 days a week, 2 tablets 3 days a week 10/26/16  Yes Manny Kaye MD  
Insulin Needles, Disposable, (NOVOFINE 32) 32 gauge x 1/4\" ndle USE AS DIRECTED WITH LEVEMIR DAILY 6/20/16  Yes Mariza Snyder, DO Lancets [facility] Dispense Accu Check Multi-Clix Drum - use to check blood sugar bid or as directed - 90 day supply 7/28/14  Yes Mariza Snyder, DO  
glucose blood VI test strips (ACCU-CHEK SMARTVIEW) strip by Does Not Apply route See Admin Instructions. Yes Provider, Historical  
Cholecalciferol, Vitamin D3, (VITAMIN D3) 2,000 unit cap daily. 2/12/14  Yes Pilar Donaldson DO  
tamsulosin (FLOMAX) 0.4 mg capsule Take 1 Cap by mouth daily (after dinner). 2/6/20   Bryan Aguilar NP  
organ concentrates (PROSTATE PO) Take  by mouth. 2 capsules daily    Provider, Historical  
losartan (COZAAR) 100 mg tablet Take 1 Tab by mouth daily. 1/14/20   Agnes Cedillo MD  
b complex vitamins tablet Take 1 Tab by mouth daily. Provider, Historical  
FOLIC ACID PO Take  by Mouth. Provider, Historical  
insulin detemir (LEVEMIR FLEXTOUCH) 100 unit/mL (3 mL) inpn Inject 30 units subcutaneously in the evening Patient taking differently: 40-45 Units nightly. 40-45units at  night 1/24/17   Claudia Gupta NP Blood-Glucose Meter (ACCU-CHEK BRY) misc by Does Not Apply route. Provider, Historical  
 
Patient Active Problem List  
Diagnosis Code  Hypertension I10  
 Hyperlipidemia E78.5  Vitamin D deficiency E55.9  Sleep apnea G47.30  Stasis dermatitis I87.2  Internal hemorrhoid K64.8  Microalbuminuria R80.9  Insulin dependent type 2 diabetes mellitus, uncontrolled ([facility]) E11.65, Z79.4  Testicular pain N50.819  
 DDD (degenerative disc disease), lumbar M51.36  
 Paroxysmal atrial fibrillation (HCC) I48.0  Acute diastolic CHF (congestive heart failure) ([facility]) I50.31  
 Nocturia R35.1  Anemia D64.9  Severe obesity (BMI 35.0-39. 9) with comorbidity ([facility] [address] E66.01  
 Former smoker Z87.891  
 History of acute pancreatitis Z87.19  
 Pancreatic pseudocyst K86.3  Idiopathic acute pancreatitis without infection or necrosis K85.00  Right kidney stone N20.0  Acute kidney injury ([facility] [address] N17.9  Lumbago M54.5  Morbid obesity due to excess calories ([facility]) E66.01  
 Pancreatitis K85.90  Type 2 diabetes mellitus without complication ([facility]) A93.8  Pancreatic duct dilated K86.89  
  Malignant neoplasm of head of pancreas ([address] C25.0  Malignant neoplasm of esophagus (HCC) C15.9  Malignant neoplasm of pancreas ([address] C25.9 Patient Active Problem List  
 Diagnosis Date Noted  Malignant neoplasm of esophagus ([address] 11/25/2019  Malignant neoplasm of pancreas ([address] 01/10/2019  Malignant neoplasm of head of pancreas ([address] 12/07/2018  Pancreatic duct dilated 08/09/2018  Morbid obesity due to excess calories ([address] 06/11/2018  Idiopathic acute pancreatitis without infection or necrosis 05/30/2018  Right kidney stone 05/30/2018  History of acute pancreatitis 05/04/2018  Pancreatic pseudocyst 05/04/2018  Severe obesity (BMI 35.0-39. 9) with comorbidity ([address] 04/09/2018  Former smoker 04/09/2018  Pancreatitis 08/14/2017  Anemia 02/28/2017  Nocturia 11/30/2016  Paroxysmal atrial fibrillation ([address] 09/22/2016  Acute diastolic CHF (congestive heart failure) ([address] 09/22/2016  Acute kidney injury ([address] 08/12/2016  DDD (degenerative disc disease), lumbar  Testicular pain 07/27/2015  Insulin dependent type 2 diabetes mellitus, uncontrolled ([address] 07/30/2014  Microalbuminuria  Sleep apnea  Stasis dermatitis [place] Internal hemorrhoid  Hypertension  Hyperlipidemia  Vitamin D deficiency  Lumbago 11/29/2010  Type 2 diabetes mellitus without complication ([address] 47/22/6725 Current Outpatient Medications Medication Sig Dispense Refill  gabapentin (NEURONTIN) 300 mg capsule Take 300 mg by mouth as needed.  lidocaine-prilocaine (EMLA) topical cream APPLY 1 2 INCH OF CREAM TO PORT SITE 30 40 MINUTES PRIOR TO PORT USE  Basaglar KwikPen U-100 Insulin 100 unit/mL (3 mL) inpn INJECT 50 UNITS SUBCUTANEOUSLY ONCE DAILY OR AS DIRECTED  metoprolol tartrate (LOPRESSOR) 25 mg tablet Take 1 Tab by mouth two (2) times a day.  TAKE ONE TABLET BY MOUTH TWICE DAILY 180 Tab 2  
  TURMERIC PO Take 500 mg by mouth. 1 tablet daily  amLODIPine (NORVASC) 5 mg tablet Take 1 Tab by mouth daily. 90 Tab 1  MILK THISTLE PO Take 250 mg by mouth daily.  multivit-min/folic/vit K/lycop (MEN'S DAILY FORMULA PO) Take  by mouth. For [employer] prostate health. 1 capsule 2 times a day  resveratrol 50 mg cap 100 mg daily.  acetaminophen (TYLENOL) 500 mg tablet 500 mg every six (6) hours as needed.  insulin aspart (NOVOLOG FLEXPEN) 100 unit/mL inpn 10-15 Units by SubCUTAneous route. 15 units subcutaneously 15 minutes before breakfast and 15units 15 minutes before lunch and 15units 15 minutes before dinner.  warfarin (COUMADIN) 3 mg tablet Take 1 Tab by mouth daily. (Patient taking differently: Take 3 mg by mouth daily. 3 tablets 4 days a week, 2 tablets 3 days a week) 10 Tab 0  
 Insulin Needles, Disposable, (NOVOFINE 32) 32 gauge x 1/4\" ndle USE AS DIRECTED WITH LEVEMIR DAILY 100 Pen Needle 11  Lancets misc Dispense Accu Check Multi-Clix Drum - use to check blood sugar bid or as directed - 90 day supply 100 Each 3  
 glucose blood VI test strips (ACCU-CHEK SMARTVIEW) strip by Does Not Apply route See Admin Instructions.  Cholecalciferol, Vitamin D3, (VITAMIN D3) 2,000 unit cap daily.  tamsulosin (FLOMAX) 0.4 mg capsule Take 1 Cap by mouth daily (after dinner). 90 Cap 3  
 organ concentrates (PROSTATE PO) Take  by mouth. 2 capsules daily  losartan (COZAAR) 100 mg tablet Take 1 Tab by mouth daily. 30 Tab 5  
 b complex vitamins tablet Take 1 Tab by mouth daily.  FOLIC ACID PO Take  by Mouth.  insulin detemir (LEVEMIR FLEXTOUCH) 100 unit/mL (3 mL) inpn Inject 30 units subcutaneously in the evening (Patient taking differently: 40-45 Units nightly. 40-45units at  night) 15 mL 5  Blood-Glucose Meter (ACCU-CHEK BRY) misc by Does Not Apply route. Allergies Allergen Reactions  Iodinated Contrast Media Other (comments) 4/27/20 - PT TOLERATED IV CONTRAST WITHOUT PREMEDICATION WITH NO REACTION TODAY - LEK  
4/23/20 - PT STATES HE TOLERATED IV CONTRAST FOR A CT AT [facility] 01/2020 WITHOUT PREMEDICATION WITH NO REACTION - STATES ALLERGY IN CHART IS INCORRECT - LEK  Iodine Other (comments) 4/27/20 - PT TOLERATED IV CONTRAST WITHOUT PREMEDICATION WITH NO REACTION TODAY - LEK  
4/23/20 - PT STATES HE TOLERATED IV CONTRAST FOR A CT AT [facility] 01/2020 WITHOUT PREMEDICATION WITH NO REACTION - STATES ALLERGY IN CHART IS INCORRECT - LEK  Vasotec [Enalapril Maleate] Other (comments) Thought to have possibly caused his pancreatitis Past Medical History:  
Diagnosis Date  BPH with obstruction/lower urinary tract symptoms  Colon polyps Dr. Ivett Goodman  DDD (degenerative disc disease), lumbar 9/15  Hyperlipidemia  Hypertension [facility] Internal hemorrhoid  Kidney stone  Microalbuminuria  Nephrolithiasis  Nocturia  Pancreatic cancer ([address] Stage IV, Taking Chemo for it at [facility]. 15  Penile lesion  Sleep apnea   
 has CPAP - Dr. Arne Hatchet  Stasis dermatitis  Testicular pain, left  Tobacco abuse  Urinary frequency  Vitamin D deficiency Past Surgical History:  
Procedure Laterality Date  HX CATARACT REMOVAL    
 x2  
 HX COLONOSCOPY  01/13/2015 Dr. Monica Gomez - repeat 1/20  HX CYST REMOVAL Back Family History Problem Relation Age of Onset  Cancer Mother   
     esophageal  
 Cancer Father   
     lung  Diabetes Maternal Grandmother Social History Tobacco Use  Smoking status: Former Smoker Packs/day: 1.00 Years: 42.00 Pack years: 42.00 Types: Cigarettes Last attempt to quit: 9/22/2016 Years since quitting: 3.9  Smokeless tobacco: Never Used Substance Use Topics  Alcohol use: Not Currently ROS Cardiac- no chest pain or palpitations Pulmonary- no sob or wheezes GI- no n/v or diarrhea. Objective: No flowsheet data found. General: alert, cooperative, no distress Mental  status: normal mood, behavior, speech, dress, motor activity, and thought processes, able to follow commands HENT: NCAT Neck: no visualized mass Resp: no respiratory distress Neuro: no gross deficits Skin: no discoloration or lesions of concern on visible areas Psychiatric: normal affect, consistent with stated mood, no evidence of hallucinations Additional exam findings: We discussed the expected course, resolution and complications of the diagnosis(es) in detail. Medication risks, benefits, costs, interactions, and alternatives were discussed as indicated. I advised him to contact the office if his condition worsens, changes or fails to improve as anticipated. He expressed understanding with the diagnosis(es) and plan. Katt Martell, who was evaluated through a patient-initiated, synchronous (real-time) audio-video encounter, and/or his healthcare decision maker, is aware that it is a billable service, with coverage as determined by his insurance carrier. He provided verbal consent to proceed: Yes, and patient identification was verified. It was conducted pursuant to the emergency declaration under the [facility], [address] [place] [address] authority and the [employer] Act. A caregiver was present when appropriate. Ability to conduct physical exam was limited. I was at home. The patient was at home.  
 
 
Jethro Mckeon MD

## 2020-09-22 NOTE — PROGRESS NOTES
[facility] [address] 
             [phone] Ben Boyer is a 79 y.o. male and presents with Ear Fullness (Dr. Cardenas Asa pt) Assessment/Plan:   
Diagnoses and all orders for this visit: 
 
1. Bilateral impacted cerumen 
-     REFERRAL TO ENT-OTOLARYNGOLOGY 
 
impacted cerumen bilat filling the entire ear canal 
Refer to ENT for treatment Follow-up and Dispositions · Return if symptoms worsen or fail to improve. Subjective: 
 
Ear fullness Onset: 2 days ago Location: bilat ear Characteristics: mostly in the morning when waking up and continues, cough 2 weeks due to new chemo, denies fever, sob, tinnitis, sinus problems PMH: ED 9/20 for ear infection, got ears flushed and was given cipro/dexa drops Has not tried any other treatments Nothing makes the symptoms better and nothing makes them worse ROS: 
   ROS As stated in HPI, otherwise all others negative. The problem list was updated as a part of today's visit. Patient Active Problem List  
Diagnosis Code  Hypertension I10  
 Hyperlipidemia E78.5  Vitamin D deficiency E55.9  Sleep apnea G47.30  Stasis dermatitis I87.2  Internal hemorrhoid K64.8  Microalbuminuria R80.9  Insulin dependent type 2 diabetes mellitus, uncontrolled (HCC) E11.65, Z79.4  Testicular pain N50.819  
 DDD (degenerative disc disease), lumbar M51.36  
 Paroxysmal atrial fibrillation (HCC) I48.0  Nocturia R35.1  Anemia D64.9  Severe obesity (BMI 35.0-39. 9) with comorbidity ([address] E66.01  
 Former smoker Z87.891  
 History of acute pancreatitis Z87.19  
 Pancreatic pseudocyst K86.3  Idiopathic acute pancreatitis without infection or necrosis K85.00  Right kidney stone N20.0  Acute kidney injury ([address] N17.9  Lumbago M54.5  Morbid obesity due to excess calories (HCC) E66.01  
 Pancreatitis K85.90  Type 2 diabetes mellitus without complication (HCC) O78.9  Pancreatic duct dilated K86.89  
 Malignant neoplasm of head of pancreas (HCC) C25.0  Malignant neoplasm of esophagus (HCC) C15.9  Malignant neoplasm of pancreas ([address] C25.9  Prostatitis N41.9 The PSH, FH were reviewed. SH: Social History Tobacco Use  Smoking status: Former Smoker Packs/day: 1.00 Years: 42.00 Pack years: 42.00 Types: Cigarettes Last attempt to quit: 2016 Years since quittin.0  Smokeless tobacco: Never Used Substance Use Topics  Alcohol use: Not Currently  Drug use: Not Currently Types: Cocaine, Marijuana Comment: Also, crack, Last used 10 years ago. Medications/Allergies: 
Current Outpatient Medications on File Prior to Visit Medication Sig Dispense Refill  nystatin (MYCOSTATIN) 100,000 unit/gram ointment Apply  to affected area two (2) times a day. 15 g 0  
 gabapentin (NEURONTIN) 300 mg capsule Take 300 mg by mouth as needed.  lidocaine-prilocaine (EMLA) topical cream APPLY 1 2 INCH OF CREAM TO PORT SITE 30 40 MINUTES PRIOR TO PORT USE  Basaglar KwikPen U-100 Insulin 100 unit/mL (3 mL) inpn INJECT 50 UNITS SUBCUTANEOUSLY ONCE DAILY OR AS DIRECTED  metoprolol tartrate (LOPRESSOR) 25 mg tablet Take 1 Tab by mouth two (2) times a day. TAKE ONE TABLET BY MOUTH TWICE DAILY 180 Tab 2  
 organ concentrates (PROSTATE PO) Take  by mouth. 2 capsules daily  losartan (COZAAR) 100 mg tablet Take 1 Tab by mouth daily. 30 Tab 5  TURMERIC PO Take 500 mg by mouth. 1 tablet daily  amLODIPine (NORVASC) 5 mg tablet Take 1 Tab by mouth daily. 90 Tab 1  MILK THISTLE PO Take 250 mg by mouth daily.  multivit-min/folic/vit K/lycop (MEN'S DAILY FORMULA PO) Take  by mouth. For [employer] prostate health. 1 capsule 2 times a day  resveratrol 50 mg cap 100 mg daily.  FOLIC ACID PO Take  by Mouth.  acetaminophen (TYLENOL) 500 mg tablet 500 mg every six (6) hours as needed.  insulin aspart (NOVOLOG FLEXPEN) 100 unit/mL inpn 10-15 Units by SubCUTAneous route. 15 units subcutaneously 15 minutes before breakfast and 15units 15 minutes before lunch and 15units 15 minutes before dinner.  insulin detemir (LEVEMIR FLEXTOUCH) 100 unit/mL (3 mL) inpn Inject 30 units subcutaneously in the evening (Patient taking differently: 40-45 Units nightly. 40-45units at  night) 15 mL 5  
 warfarin (COUMADIN) 3 mg tablet Take 1 Tab by mouth daily. (Patient taking differently: Take 3 mg by mouth daily. 3 tablets 4 days a week, 2 tablets 3 days a week) 10 Tab 0  
 Insulin Needles, Disposable, (NOVOFINE 32) 32 gauge x 1/4\" ndle USE AS DIRECTED WITH LEVEMIR DAILY 100 Pen Needle 11  Lancets misc Dispense Accu Check Multi-Clix Drum - use to check blood sugar bid or as directed - 90 day supply 100 Each 3  Blood-Glucose Meter (ACCU-CHEK BRY) misc by Does Not Apply route.  glucose blood VI test strips (ACCU-CHEK SMARTVIEW) strip by Does Not Apply route See Admin Instructions.  Cholecalciferol, Vitamin D3, (VITAMIN D3) 2,000 unit cap daily.  tamsulosin (FLOMAX) 0.4 mg capsule Take 1 Cap by mouth daily (after dinner). 90 Cap 3  
 b complex vitamins tablet Take 1 Tab by mouth daily. No current facility-administered medications on file prior to visit. Allergies Allergen Reactions  Iodinated Contrast Media Other (comments) 4/27/20 - PT TOLERATED IV CONTRAST WITHOUT PREMEDICATION WITH NO REACTION TODAY - LEK  
4/23/20 - PT STATES HE TOLERATED IV CONTRAST FOR A CT AT [facility] 01/2020 WITHOUT PREMEDICATION WITH NO REACTION - STATES ALLERGY IN CHART IS INCORRECT - LEK  Iodine Other (comments)   4/27/20 - PT TOLERATED IV CONTRAST WITHOUT PREMEDICATION WITH NO REACTION TODAY - LEK  
4/23/20 - PT STATES HE TOLERATED IV CONTRAST FOR A CT AT [facility] 01/2020 WITHOUT PREMEDICATION WITH NO REACTION - STATES ALLERGY IN CHART IS INCORRECT - LEK   
  Vasotec [Enalapril Maleate] Other (comments) Thought to have possibly caused his pancreatitis Objective: 
Visit Vitals /62 Pulse 76 Temp 97.4 °F (36.3 °C) (Oral) Resp 12 Ht 5' 9\" (1.753 m) Wt 247 lb (112 kg) SpO2 100% BMI 36.48 kg/m² Body mass index is 36.48 kg/m². Physical assessment Physical Exam 
Vitals signs and nursing note reviewed. HENT:  
   Head: Normocephalic and atraumatic. Right Ear: Decreased hearing noted. There is impacted cerumen. Left Ear: Decreased hearing noted. There is impacted cerumen. Cardiovascular:  
   Rate and Rhythm: Normal rate and regular rhythm. Heart sounds: Normal heart sounds. Pulmonary:  
   Effort: Pulmonary effort is normal.  
   Breath sounds: Normal breath sounds. Skin: 
   General: Skin is warm and dry. Neurological:  
   Mental Status: He is alert and oriented to person, place, and time. Gait: Gait is intact. Labwork and Ancillary Studies: CBC w/Diff Lab Results Component Value Date/Time WBC 7.5 09/23/2018 04:20 PM  
 HGB 11.5 (L) 09/23/2018 04:20 PM  
 PLATELET 861 93/08/7058 04:20 PM  
  
 
 Basic Metabolic Profile Lab Results Component Value Date/Time Sodium 133 (L) 09/14/2020 09:29 AM  
 Potassium 4.7 09/14/2020 09:29 AM  
 Chloride 100 09/14/2020 09:29 AM  
 CO2 26 09/14/2020 09:29 AM  
 Anion gap 7 09/14/2020 09:29 AM  
 Glucose 175 (H) 09/14/2020 09:29 AM  
 BUN 11 09/14/2020 09:29 AM  
 Creatinine 0.81 09/14/2020 09:29 AM  
 BUN/Creatinine ratio 14 09/14/2020 09:29 AM  
 GFR est AA >60 09/14/2020 09:29 AM  
 GFR est non-AA >60 09/14/2020 09:29 AM  
 Calcium 8.7 09/14/2020 09:29 AM  
  
 
Cholesterol Lab Results Component Value Date/Time Cholesterol, total 153 09/14/2020 09:31 AM  
 HDL Cholesterol 59 09/14/2020 09:31 AM  
 LDL, calculated 81 09/14/2020 09:31 AM  
 Triglyceride 65 09/14/2020 09:31 AM  
 CHOL/HDL Ratio 2.6 09/14/2020 09:31 AM  
 
 
Health Maintenance: Health Maintenance Topic Date Due  Shingrix Vaccine Age 50> (1 of 2) 05/16/2000  Foot Exam Q1  10/14/2016  Prostate-Specific Antigen  10/10/2018  A1C test (Diabetic or Prediabetic)  04/02/2019  MICROALBUMIN Q1  04/09/2019  Lipid Screen  04/09/2019  Colonoscopy  01/13/2020  Flu Vaccine (1) 09/01/2020  Pneumococcal 65+ years (1 of 1 - PPSV23) 06/19/2030 (Originally 5/16/2015)  Medicare Yearly Exam  01/14/2021  GLAUCOMA SCREENING Q2Y  01/07/2022  Eye Exam Retinal or Dilated  01/07/2022  DTaP/Tdap/Td series (3 - Td) 07/29/2030  Hepatitis C Screening  Completed  AAA Screening 73-69 YO Male Smoking Patients  Completed I have discussed the diagnosis with the patient and the intended plan as seen in the above orders. The patient has received an After-Visit Summary and questions were answered concerning future plans. An After Visit Summary was printed and given to the patient. All diagnosis have been discussed with the patient and all of the patient's questions have been answered. Follow-up and Dispositions · Return if symptoms worsen or fail to improve. VAUGHN Harmon- [address] [facility] [employer] [address] [address] Area H Indication Text: Tumors in this location are included in Area H (eyelids, eyebrows, nose, lips, chin, ear, pre-auricular, post-auricular, temple, genitalia, hands, feet, ankles and areola).  Tissue conservation is critical in these anatomic locations.

## 2020-09-22 NOTE — PROGRESS NOTES
Chief Complaint Patient presents with  Ear Fullness Dr. Mine Kaufman pt 1. Have you been to the ER, urgent care clinic since your last visit? Hospitalized since your last visit?x 2 days [facility] for hearing 2. Have you seen or consulted any other health care providers outside of the [address] [place] [address] since your last visit? Include any pap smears or colon screening. Oncology , urology , neurology.

## 2020-12-16 NOTE — PROGRESS NOTES
Transitions of Care Care Transitions Nurse ( CTN) attempted to contact patient via telephone call regarding recent hospital discharge from [facility].  
There was no response. A voicemail message was left requesting a non-emergency return telephone call. CTN  contact information provided.

## 2020-12-17 NOTE — PROGRESS NOTES
Spoke to pt around 115pm- he said to call him around appt time. He did state he might not make appt because if \" his car is ready for pickup from the shop\" then he would not take our call. Called pt- no reply- [phone] Called again at [phone]- no reply

## 2020-12-17 NOTE — PROGRESS NOTES
Patient was admitted to THE [facility] on 2020 and discharged on 12/15/2020 for Fall, nosebleed, and acute blood loss anemia Outreach made within 2 business days of discharge: Yes Top Discharge Challenges to be reviewed by the provider Additional needs identified to be addressed with provider: yes Discussed COVID-19 related testing which was not done at this time. Please review in Care Everywhere additional verification. Patient declined review of medications. Therefore medication record has not been reviewed with patient. Please see list of discharge medications per discharge summary from [employer] system. Method of communication with provider : chart routing Advance Care Planning:  
Does patient have an Advance Directive:  none noted to be on file Inpatient Readmission Risk score:  
Not available through [facility] Was this a readmission? no  
Patient stated reason for the admission: n/a Patients top risk factors for readmission: medical condition Interventions to address risk factors:  
See goals please Care Transition Nurse (CTN) contacted the patient by telephone to perform post hospital discharge assessment. Verified name and  with patient as identifiers. Provided introduction to self, and explanation of the CTN role. CTN reviewed discharge instructions, medical action plan and red flags with patient who verbalized understanding. Patient given an opportunity to ask questions and does not have any further questions or concerns at this time. The patient agrees to contact the PCP office for questions related to their healthcare. Medication reconciliation was offered with patient, who verbalizes understanding of administration of home medications. Patient declined review of medications. Referral to Pharm D needed: no and will connor to PCP for referral to Outpatient pharmacist referral as needed. Home Health/Outpatient orders at discharge: none noted at this time [address]: n/a Date of initial visit: n/a Durable Medical Equipment ordered at discharge: none noted at this time [address]: n/a Durable Medical Equipment received: n/a Covid Risk Education Patient has following risk factors of: diabetes. Patient declined review of Covid risk education. Patient stated that he is doing what he can to protect himself and is aware of Covid information. Patient/family/caregiver given information for [employer] and agrees to enroll no Discussed follow-up appointments. If no appointment was previously scheduled, appointment scheduling offered: Follow up appointment discussed with patient. Per chart review, there is a follow up appointment scheduled for this date, 12- @ 4:00 pm.  Patient states that he does not know anything about this and that PCP office was calling on the other line as we were talking. Patient related that he could not attend as his car is in the shop. CTN related that this might be a visit by phone or computer. Patient was very nicely related that he prefers not to have a last minute type of appointment. Patient stated that the office would call him back. CTN will attempt to monitor follow up appointment and assist as needed to facilitate scheduling. [facility] follow up appointment(s):  
Future Appointments Date Time Provider [place] 12/17/2020  4:00 PM CAITLIN Viera BS AMB  
1/8/2021  3:20 PM [facility] NURSE [facility] OpenPM  
1/15/2021 10:00 AM Polina Erwin MD [facility] OpenPM  
2/26/2021  9:00 AM MD AGGIE Blevins BS AMB [facility] follow up appointment(s): INR check in 3 days Patient states that he has been trying to reach his Coumad[facility] Clinic on [facility] for follow up. Patient encouraged to continue to try and reach his Coumadin clinic or follow up with his physicians as needed. Plan for follow-up call in 10-14 days /as needed based on severity of symptoms and risk factors. CTN provided contact information for future needs. Goals Addressed This Visit's Progress  Attends follow-up appointments as directed. Target Date:  1/17/2021 12- Patient to follow up as below: - Patient to follow up with PCP.   
- Per EMR review follow up appointment scheduled for 12-. Patient stated he cannot attend this appointment. CTN to monitor and assist as needed re: assistance with scheduling follow up appointment.  Prevent complications post hospitalization. Target Date: 1/17/2021 12- Patient and/or family members were alerted to the availability of physician or other advanced practioners after hours, weekends, and holidays. Please call the PCP office phone number and speak with the answering service for assistance. Care Transitions Nurse ( CTN)   contact information provided for follow up as needed. 12- Care Transitions Nurse (CTN) reviewed red flags with patient as follows: Please call 911 for immediate assistance for symptoms such as:  
- Chest Pain - Severe shortness of breath  
- Change in mental status - Blue tint to face or lips - New or worsening symptoms - Any other concerns. 12/17/2020 Patient to follow up [facility] INR lab work per discharge instructions. Patient states that he follows up with the Coumadin Clinic on [address]. He has called the clinic to try and arrange follow up. Per chart review patient did not attend PCP follow up appointment scheduled for this date. CTN forwarded request to CTN staff with [address] [place] [address] for assistance with rescheduling PCP follow up appointment within 7 days of hospital discharge.

## 2021-01-01 ENCOUNTER — TRANSCRIBE ORDER (OUTPATIENT)
Dept: SCHEDULING | Age: 71
End: 2021-01-01

## 2021-01-01 ENCOUNTER — PATIENT OUTREACH (OUTPATIENT)
Dept: CASE MANAGEMENT | Age: 71
End: 2021-01-01

## 2021-01-01 ENCOUNTER — OFFICE VISIT (OUTPATIENT)
Dept: FAMILY MEDICINE CLINIC | Age: 71
End: 2021-01-01
Payer: MEDICARE

## 2021-01-01 ENCOUNTER — VIRTUAL VISIT (OUTPATIENT)
Dept: FAMILY MEDICINE CLINIC | Age: 71
End: 2021-01-01

## 2021-01-01 VITALS
HEART RATE: 76 BPM | BODY MASS INDEX: 30.81 KG/M2 | DIASTOLIC BLOOD PRESSURE: 64 MMHG | HEIGHT: 69 IN | OXYGEN SATURATION: 96 % | SYSTOLIC BLOOD PRESSURE: 132 MMHG | WEIGHT: 208 LBS | RESPIRATION RATE: 16 BRPM | TEMPERATURE: 98.9 F

## 2021-01-01 DIAGNOSIS — G62.0 DRUG-INDUCED POLYNEUROPATHY (HCC): ICD-10-CM

## 2021-01-01 DIAGNOSIS — I10 ESSENTIAL HYPERTENSION: ICD-10-CM

## 2021-01-01 DIAGNOSIS — Z76.89 ENCOUNTER TO ESTABLISH CARE: ICD-10-CM

## 2021-01-01 DIAGNOSIS — C25.0 MALIGNANT NEOPLASM OF HEAD OF PANCREAS (HCC): Primary | ICD-10-CM

## 2021-01-01 DIAGNOSIS — C25.0 MALIGNANT NEOPLASM OF HEAD OF PANCREAS (HCC): ICD-10-CM

## 2021-01-01 DIAGNOSIS — Z00.00 MEDICARE ANNUAL WELLNESS VISIT, SUBSEQUENT: Primary | ICD-10-CM

## 2021-01-01 DIAGNOSIS — Z71.89 ADVANCED CARE PLANNING/COUNSELING DISCUSSION: ICD-10-CM

## 2021-01-01 DIAGNOSIS — E78.2 MIXED HYPERLIPIDEMIA: ICD-10-CM

## 2021-01-01 DIAGNOSIS — I48.0 PAROXYSMAL ATRIAL FIBRILLATION (HCC): ICD-10-CM

## 2021-01-01 DIAGNOSIS — E55.9 VITAMIN D DEFICIENCY: ICD-10-CM

## 2021-01-01 DIAGNOSIS — R80.9 MICROALBUMINURIA: ICD-10-CM

## 2021-01-01 PROCEDURE — 2022F DILAT RTA XM EVC RTNOPTHY: CPT | Performed by: NURSE PRACTITIONER

## 2021-01-01 PROCEDURE — G8417 CALC BMI ABV UP PARAM F/U: HCPCS | Performed by: NURSE PRACTITIONER

## 2021-01-01 PROCEDURE — G8427 DOCREV CUR MEDS BY ELIG CLIN: HCPCS | Performed by: NURSE PRACTITIONER

## 2021-01-01 PROCEDURE — G8754 DIAS BP LESS 90: HCPCS | Performed by: NURSE PRACTITIONER

## 2021-01-01 PROCEDURE — 1101F PT FALLS ASSESS-DOCD LE1/YR: CPT | Performed by: NURSE PRACTITIONER

## 2021-01-01 PROCEDURE — G0439 PPPS, SUBSEQ VISIT: HCPCS | Performed by: NURSE PRACTITIONER

## 2021-01-01 PROCEDURE — 3017F COLORECTAL CA SCREEN DOC REV: CPT | Performed by: NURSE PRACTITIONER

## 2021-01-01 PROCEDURE — G8432 DEP SCR NOT DOC, RNG: HCPCS | Performed by: NURSE PRACTITIONER

## 2021-01-01 PROCEDURE — 3046F HEMOGLOBIN A1C LEVEL >9.0%: CPT | Performed by: NURSE PRACTITIONER

## 2021-01-01 PROCEDURE — G8752 SYS BP LESS 140: HCPCS | Performed by: NURSE PRACTITIONER

## 2021-01-01 PROCEDURE — G8536 NO DOC ELDER MAL SCRN: HCPCS | Performed by: NURSE PRACTITIONER

## 2021-01-06 NOTE — PROGRESS NOTES
Transitions of Care Follow Up Care Transitions Nurse ( CTN) attempted to contact patient via telephone call. There was no response. A voicemail message was left requesting a non-emergency return telephone call. CTN  contact information provided.

## 2021-01-22 NOTE — PROGRESS NOTES
Transition of Care Follow Up Care Transitions Nurse ( CTN) attempted to contact patient via telephone call. There was no response. A voicemail message was left requesting a non-emergency return telephone call. CTN  contact information provided. No return call received as of this time. Patient has graduated from the Transitions of Care Coordination  program on 1-22-21. Goals Addressed This Visit's Progress  Attends follow-up appointments as directed. Target Date:  1/17/2021 12- Patient to follow up as below: - Patient to follow up with PCP.   
- Per EMR review follow up appointment scheduled for 12-. Patient stated he cannot attend this appointment. CTN to monitor and assist as needed re: assistance with scheduling follow up appointment. 1/22/2021 CTN unable to reach patient for follow up.  Prevent complications post hospitalization. Target Date: 1/17/2021 12- Patient and/or family members were alerted to the availability of physician or other advanced practioners after hours, weekends, and holidays. Please call the PCP office phone number and speak with the answering service for assistance. Care Transitions Nurse ( CTN)   contact information provided for follow up as needed. 12- Care Transitions Nurse (CTN) reviewed red flags with patient as follows: Please call 911 for immediate assistance for symptoms such as:  
- Chest Pain - Severe shortness of breath  
- Change in mental status - Blue tint to face or lips - New or worsening symptoms - Any other concerns. 12/17/2020 Patient to follow up witth INR lab work per discharge instructions. Patient states that he follows up with the Coumadin Clinic on [address]. He has called the clinic to try and arrange follow up.   
 
1/22/2021 Per chart review patient has not been readmitted to the hospital setting within the last 30 days. Patient has care transitions nurse contact information for any further questions, concerns, or needs. Patient's upcoming visits:  No future appointments.

## 2021-01-22 NOTE — PROGRESS NOTES
Transitions of Care Referral for possible case management follow up sent to ambulatory care management staff with [facility] via confidential e-mail.

## 2021-04-13 PROBLEM — G62.0 DRUG-INDUCED POLYNEUROPATHY (HCC): Status: ACTIVE | Noted: 2021-01-01

## 2021-04-13 PROBLEM — E66.01 SEVERE OBESITY (BMI 35.0-39.9) WITH COMORBIDITY (HCC): Status: RESOLVED | Noted: 2018-04-09 | Resolved: 2021-01-01

## 2021-04-13 PROBLEM — K85.90 PANCREATITIS: Status: RESOLVED | Noted: 2017-08-14 | Resolved: 2021-01-01

## 2021-04-13 PROBLEM — K86.89 PANCREATIC DUCT DILATED: Status: RESOLVED | Noted: 2018-08-09 | Resolved: 2021-01-01

## 2021-04-13 PROBLEM — E66.01 MORBID OBESITY DUE TO EXCESS CALORIES (HCC): Status: RESOLVED | Noted: 2018-06-11 | Resolved: 2021-01-01

## 2021-04-13 PROBLEM — K85.00 IDIOPATHIC ACUTE PANCREATITIS WITHOUT INFECTION OR NECROSIS: Status: RESOLVED | Noted: 2018-05-30 | Resolved: 2021-01-01

## 2021-04-13 PROBLEM — N20.0 RIGHT KIDNEY STONE: Status: RESOLVED | Noted: 2018-05-30 | Resolved: 2021-01-01

## 2021-04-13 NOTE — PATIENT INSTRUCTIONS
Medicare Wellness Visit, Male The best way to live healthy is to have a lifestyle where you eat a well-balanced diet, exercise regularly, limit alcohol use, and quit all forms of tobacco/nicotine, if applicable. Regular preventive services are another way to keep healthy. Preventive services (vaccines, screening tests, monitoring & exams) can help personalize your care plan, which helps you manage your own care. Screening tests can find health problems at the earliest stages, when they are easiest to treat. [facility] follows the current, evidence-based guidelines published by the [facility] Raghav Claudette ([facility]) when recommending preventive services for our patients. Because we follow these guidelines, sometimes recommendations change over time as research supports it. (For example, a prostate screening blood test is no longer routinely recommended for men with no symptoms). Of course, you and your doctor may decide to screen more often for some diseases, based on your risk and co-morbidities (chronic disease you are already diagnosed with). Preventive services for you include: - Medicare offers their members a free annual wellness visit, which is time for you and your primary care provider to discuss and plan for your preventive service needs. Take advantage of this benefit every year! 
-All adults over age 72 should receive the recommended pneumonia vaccines. Current USPSTF guidelines recommend a series of two vaccines for the best pneumonia protection.  
-All adults should have a flu vaccine yearly and tetanus vaccine every 10 years. 
-All adults age 48 and older should receive the shingles vaccines (series of two vaccines).       
-All adults age 38-68 who are overweight should have a diabetes screening test once every three years.  
-Other screening tests & preventive services for persons with diabetes include: an eye exam to screen for diabetic retinopathy, a kidney function test, a foot exam, and stricter control over your cholesterol.  
-Cardiovascular screening for adults with routine risk involves an electrocardiogram (ECG) at intervals determined by the provider.  
-Colorectal cancer screening should be done for adults age 54-65 with no increased risk factors for colorectal cancer. There are a number of acceptable methods of screening for this type of cancer. Each test has its own benefits and drawbacks. Discuss with your provider what is most appropriate for you during your annual wellness visit. The different tests include: colonoscopy (considered the best screening method), a fecal occult blood test, a fecal DNA test, and sigmoidoscopy. 
-All adults born between [facility] should be screened once for Hepatitis C. 
-An Abdominal Aortic Aneurysm (AAA) Screening is recommended for men age 73-68 who has ever smoked in their lifetime. Here is a list of your current Health Maintenance items (your personalized list of preventive services) with a due date: 
Health Maintenance Due Topic Date Due  
 COVID-19 Vaccine (1) Never done  Shingles Vaccine (1 of 2) Never done [facility]  10/14/2016  Hemoglobin A1C    12/14/2020

## 2021-04-13 NOTE — ACP (ADVANCE CARE PLANNING)
Advance Care Planning Advance Care Planning (ACP) Physician/NP/PA Conversation Date of Conversation: 4/13/2021 Conducted with: Patient with Decision Making Capacity Healthcare Decision Maker:  
 
Click here to complete [address] including selection of the Healthcare Decision Maker Relationship (ie \"Primary\") Today we documented Decision Maker(s). The patient will provide ACP documents. Care Preferences: Hospitalization: \"If your health worsens and it becomes clear that your chance of recovery is unlikely, what would be your preference regarding hospitalization? \" The patient is unsure. Ventilation: \"If you were unable to breathe on your own and your chance of recovery was unlikely, what would be your preference about the use of a ventilator (breathing machine) if it was available to you? \" The patient would desire the use of a ventilator. Resuscitation: \"In the event your heart stopped as a result of an underlying serious health condition, would you want attempts to be made to restart your heart, or would you prefer a natural death? \" The patient is unsure. Additional topics discussed: ventilation preferences, hospitalization preferences, resuscitation preferences and end of life care preferences (vegetative state/imminent death) Conversation Outcomes / Follow-Up Plan:  
ACP in process - completing/providing documents Reviewed DNR/DNI and patient elects Full Code (Attempt Resuscitation) Length of Voluntary ACP Conversation in minutes:  <16 minutes (Non-Billable) Leonidas Santamaria NP

## 2021-04-13 NOTE — PROGRESS NOTES
[address] 
             [phone] Mary Bergeron is a 79 y.o. male and presents with \[facility] [place] [facility]\"" Care (former Dr. Leeann Fontaine patient) and Annual Wellness Visit Assessment/Plan:   
Diagnoses and all orders for this visit: 
 
1. Medicare annual wellness visit, subsequent Completed today to include EtOH and depression screening 2. Advanced care planning/counseling discussion Healthcare decision-makers updated Discussion performed and documented in note, he was given copy of ACP to take home to fill out and I requested he bring it back with his next visit 3. Essential hypertension -     METABOLIC PANEL, COMPREHENSIVE; Future Endorses medication compliance Blood pressure stable, continue same medications Follow-up labs today 4. Insulin dependent type 2 diabetes mellitus, uncontrolled ([facility] [address] Assessment & Plan: This condition is managed by Specialist. 
 
Orders: 
-     HEMOGLOBIN A1C WITH EAG; Future -     MICROALBUMIN, UR, RAND W/ MICROALB/CREAT RATIO; Future Endorses medication compliance This is managed by endocrinology at [facility] States his home glucoses run in the range of 150 Last A1c we have documented is from September 2020, will obtain updated lab values today 5. Mixed hyperlipidemia -     LIPID PANEL; Future Currently not on any medications Endorses eating a low-fat diet Follow-up lipids today 6. Microalbuminuria -     MICROALBUMIN, UR, RAND W/ MICROALB/CREAT RATIO; Future Follow-up labs today 7. Vitamin D deficiency He has been taking over-the-counter vitamin D daily, advised him to continue with the same, his levels have been stable 8. Malignant neoplasm of head of pancreas ([address] Assessment & Plan: This condition is managed by Specialist. 
 
 
9. Drug-induced polyneuropathy ([address] Assessment & Plan: This condition is managed by Specialist. 
 
 
10. Paroxysmal atrial fibrillation ([address] Assessment & Plan: This condition is managed by Specialist. 
 
 
11. Encounter to establish care Visit today to establish care, prior patient of Dr. Ashley Back Follow-up and Dispositions · Return in about 4 months (around 8/13/2021) for DM, DM foot, HLD, HTN, 30 min, office only. Subjective: 
 
DMII- Managed by endocrinology, has a new doctor, Dr. Tatiana Garcia at [facility] Patient reports medication compliance Daily Diabetic diet compliance states \"not to good\" with chemo appetite comes and goes Patient monitors blood sugars regularly 1-2 times a week Reports am fasting sugars range 150's Denies hypoglycemic episodes no, had one reading of 60 with symptoms Denies polyuria, polydipsia, paraesthesia, vision changes? yes Diabetic Foot and Eye Exam HM Status Topic Date Due  
 Diabetic Foot Care  10/14/2016 Emiliano Eye Exam  01/07/2022 Hemoglobin A1c Date Value Ref Range Status 09/14/2020 12.6 (H) 4.2 - 5.6 % Final  
  Comment:  
  (NOTE) HbA1C Interpretive Ranges <5.7              Normal 
5.7 - 6.4         Consider Prediabetes >6.5              Consider Diabetes Hemoglobin A1c, External  
Date Value Ref Range Status 01/02/2019 10.1  Final  
] Creatinine, urine Date Value Ref Range Status 09/14/2020 222.00 (H) 30 - 125 mg/dL Final  
 
Microalbumin/Creat ratio (mg/g creat) Date Value Ref Range Status 09/14/2020 281 (H) 0 - 30 mg/g Final  
12/21/2016 814 (H) 0 - 30 mg/g Final  
 
Microalb/Creat ratio (ug/mg creat.) Date Value Ref Range Status 04/09/2018 437.9 (H) 0.0 - 30.0 mcg/mg of Creatinine Final  
10/14/2015 11.4 0.0 - 30.0 mg/g creat Final  
 
Key Antihyperglycemic Medications Basaglar KwikPen U-100 Insulin 100 unit/mL (3 mL) inpn (Taking) INJECT 50 UNITS SUBCUTANEOUSLY ONCE DAILY OR AS DIRECTED  
 insulin aspart (NOVOLOG FLEXPEN) 100 unit/mL inpn (Taking) 10-15 Units by SubCUTAneous route.  15 units subcutaneously 15 minutes before breakfast and 15units 15 minutes before lunch and 15units 15 minutes before dinner. Hypertension: 
 Patient reports taking medications as instructed. yes Medication side effects noted. no Headache upon wakening. no 
 Home BP monitoring in range of has not checked. Do you experience chest pain/pressure or SOB with exertion? no 
Maintain a low salt diet? yes Key CAD CHF Meds   
    
  
 metoprolol tartrate (LOPRESSOR) 25 mg tablet (Taking) Take 1 Tab by mouth two (2) times a day. TAKE ONE TABLET BY MOUTH TWICE DAILY  
 warfarin (COUMADIN) 3 mg tablet (Taking) Take 1 Tab by mouth daily. HLD: 
Has been compliant with meds  currently not on medications Compliant with low-fat diet. most of the time Denies myalgias or other side effects. N/A Cholesterol, total  
Date Value Ref Range Status 09/14/2020 153 <200 MG/DL Final  
 
Triglyceride Date Value Ref Range Status 09/14/2020 65 <150 MG/DL Final  
  Comment:  
  The drugs N-acetylcysteine (NAC) and 
Metamiszole have been found to cause falsely 
low results in this chemical assay. Please 
be sure to submit blood samples obtained BEFORE administration of either of these 
drugs to assure correct results. HDL Cholesterol Date Value Ref Range Status 09/14/2020 59 40 - 60 MG/DL Final  
 
LDL, calculated Date Value Ref Range Status 09/14/2020 81 0 - 100 MG/DL Final  
] Key Antihyperlipidemia Meds The patient is on no antihyperlipidemia meds. ROS: 
   ROS As stated in HPI, otherwise all others negative. The problem list was updated as a part of today's visit. Patient Active Problem List  
Diagnosis Code  Hypertension I10  
 Hyperlipidemia E78.5  Vitamin D deficiency E55.9  Sleep apnea G47.30  Stasis dermatitis I87.2  Internal hemorrhoid K64.8  Microalbuminuria R80.9  Insulin dependent type 2 diabetes mellitus, uncontrolled (HCC) E11.65, Z79.4  DDD (degenerative disc disease), lumbar M51.36  
 Paroxysmal atrial fibrillation (HCC) I48.0  Nocturia R35.1  Anemia D64.9 Katy Rollins Former smoker Z87.891  
 History of acute pancreatitis Z87.19  
 Pancreatic pseudocyst K86.3  Lumbago M54.5  Type 2 diabetes mellitus without complication (HCC) P12.4  Malignant neoplasm of head of pancreas ([facility] [address] C25.0  Malignant neoplasm of esophagus (HCC) C15.9  Malignant neoplasm of pancreas ([facility] [address] C25.9  Prostatitis N41.9  Drug-induced polyneuropathy ([facility] [address] G62.0 The PSH, FH were reviewed. SH: Social History Tobacco Use  Smoking status: Former Smoker Packs/day: 1.00 Years: 42.00 Pack years: 42.00 Types: Cigarettes Quit date: 2016 Years since quittin.5  Smokeless tobacco: Never Used Substance Use Topics  Alcohol use: Not Currently  Drug use: Not Currently Types: Cocaine, Marijuana Comment: Also, crack, Last used 10 years ago. Medications/Allergies: 
Current Outpatient Medications on File Prior to Visit Medication Sig Dispense Refill  metoprolol tartrate (LOPRESSOR) 25 mg tablet Take 1 Tab by mouth two (2) times a day. TAKE ONE TABLET BY MOUTH TWICE DAILY 180 Tab 2  
 lidocaine-prilocaine (EMLA) topical cream APPLY 1 2 INCH OF CREAM TO PORT SITE 30 40 MINUTES PRIOR TO PORT USE  Basaglar KwikPen U-100 Insulin 100 unit/mL (3 mL) inpn INJECT 50 UNITS SUBCUTANEOUSLY ONCE DAILY OR AS DIRECTED  tamsulosin (FLOMAX) 0.4 mg capsule Take 1 Cap by mouth daily (after dinner). (Patient taking differently: Take 0.4 mg by mouth daily (after dinner). As needed) 90 Cap 3  
 organ concentrates (PROSTATE PO) Take  by mouth. 2 capsules daily  b complex vitamins tablet Take 1 Tab by mouth daily.  TURMERIC PO Take 500 mg by mouth. 1 tablet daily  MILK THISTLE PO Take 250 mg by mouth daily.  multivit-min/folic/vit K/lycop (MEN'S DAILY FORMULA PO) Take  by mouth. For [employer] prostate health. 1 capsule 2 times a day  acetaminophen (TYLENOL) 500 mg tablet 500 mg every six (6) hours as needed.  insulin aspart (NOVOLOG FLEXPEN) 100 unit/mL inpn 10-15 Units by SubCUTAneous route. 15 units subcutaneously 15 minutes before breakfast and 15units 15 minutes before lunch and 15units 15 minutes before dinner.  warfarin (COUMADIN) 3 mg tablet Take 1 Tab by mouth daily. (Patient taking differently: Take 3 mg by mouth daily. 3 tablets 4 days a week, 2 tablets 3 days a week) 10 Tab 0  
 Lancets [facility] Dispense Accu Check Multi-Clix Drum - use to check blood sugar bid or as directed - 90 day supply 100 Each 3  Blood-Glucose Meter (ACCU-CHEK BRY) misc by Does Not Apply route.  glucose blood VI test strips (ACCU-CHEK SMARTVIEW) strip by Does Not Apply route See Admin Instructions.  Cholecalciferol, Vitamin D3, (VITAMIN D3) 2,000 unit cap daily.  [DISCONTINUED] nystatin (MYCOSTATIN) 100,000 unit/gram ointment Apply  to affected area two (2) times a day. 15 g 0  
 [DISCONTINUED] gabapentin (NEURONTIN) 300 mg capsule Take 300 mg by mouth as needed.  [DISCONTINUED] losartan (COZAAR) 100 mg tablet Take 1 Tab by mouth daily. 30 Tab 5  [DISCONTINUED] amLODIPine (NORVASC) 5 mg tablet Take 1 Tab by mouth daily. 90 Tab 1  [DISCONTINUED] resveratrol 50 mg cap 100 mg daily.  [DISCONTINUED] FOLIC ACID PO Take  by Mouth.  [DISCONTINUED] insulin detemir (LEVEMIR FLEXTOUCH) 100 unit/mL (3 mL) inpn Inject 30 units subcutaneously in the evening (Patient taking differently: 40-45 Units nightly. 40-45units at  night) 15 mL 5  
 Insulin Needles, Disposable, (NOVOFINE 32) 32 gauge x 1/4\" ndle USE AS DIRECTED WITH LEVEMIR DAILY 100 Pen Needle 11 No current facility-administered medications on file prior to visit. Allergies Allergen Reactions  Iodinated Contrast Media Other (comments)   4/27/20 - PT TOLERATED IV CONTRAST WITHOUT PREMEDICATION WITH NO REACTION TODAY - LEK  
4/23/20 - PT STATES HE TOLERATED IV CONTRAST FOR A CT AT [facility] 2020 WITHOUT PREMEDICATION WITH NO REACTION - STATES ALLERGY IN CHART IS INCORRECT - LEK  Iodine Other (comments) 20 - PT TOLERATED IV CONTRAST WITHOUT PREMEDICATION WITH NO REACTION TODAY - LEK  
20 - PT STATES HE TOLERATED IV CONTRAST FOR A CT AT [facility] 2020 WITHOUT PREMEDICATION WITH NO REACTION - STATES ALLERGY IN CHART IS INCORRECT - LEK  Vasotec [Enalapril Maleate] Other (comments) Thought to have possibly caused his pancreatitis Objective: 
Visit Vitals /64 Pulse 76 Temp 98.9 °F (37.2 °C) (Temporal) Resp 16 Ht 5' 9\" (1.753 m) Wt 208 lb (94.3 kg) SpO2 96% BMI 30.72 kg/m² Body mass index is 30.72 kg/m². Physical assessment Physical Exam 
Vitals signs and nursing note reviewed. Eyes:  
   Conjunctiva/sclera: Conjunctivae normal.  
   Pupils: Pupils are equal, round, and reactive to light. Neck: Musculoskeletal: Normal range of motion. Vascular: No JVD. Cardiovascular:  
   Rate and Rhythm: Normal rate and regular rhythm. Heart sounds: Normal heart sounds. No murmur. No friction rub. No gallop. Pulmonary:  
   Effort: Pulmonary effort is normal.  
   Breath sounds: Normal breath sounds. Musculoskeletal: Normal range of motion. Right lower le+ Pitting Edema present. Left lower le+ Pitting Edema present. Skin: 
   General: Skin is warm and dry. Neurological:  
   Mental Status: He is alert and oriented to person, place, and time. Psychiatric:     
   Mood and Affect: Affect normal.     
   Cognition and Memory: Memory normal.     
   Judgment: Judgment normal.  
 
 
 
 
Labwork and Ancillary Studies: CBC w/Diff Lab Results Component Value Date/Time WBC 7.5 2018 04:20 PM  
 HGB 11.5 (L) 2018 04:20 PM  
 PLATELET 863  04:20 PM  
  
 
 Basic Metabolic Profile Lab Results Component Value Date/Time  Sodium 133 (L) 2020 09:29 AM  
 Potassium 4.7 2020 09:29 AM  
 Chloride 100 09/14/2020 09:29 AM  
 CO2 26 09/14/2020 09:29 AM  
 Anion gap 7 09/14/2020 09:29 AM  
 Glucose 175 (H) 09/14/2020 09:29 AM  
 BUN 11 09/14/2020 09:29 AM  
 Creatinine 0.81 09/14/2020 09:29 AM  
 BUN/Creatinine ratio 14 09/14/2020 09:29 AM  
 GFR est AA >60 09/14/2020 09:29 AM  
 GFR est non-AA >60 09/14/2020 09:29 AM  
 Calcium 8.7 09/14/2020 09:29 AM  
  
 
Cholesterol Lab Results Component Value Date/Time Cholesterol, total 153 09/14/2020 09:31 AM  
 HDL Cholesterol 59 09/14/2020 09:31 AM  
 LDL, calculated 81 09/14/2020 09:31 AM  
 Triglyceride 65 09/14/2020 09:31 AM  
 CHOL/HDL Ratio 2.6 09/14/2020 09:31 AM  
 
 
Health Maintenance:  
Health Maintenance Topic Date Due  
 COVID-19 Vaccine (1) Never done  Shingrix Vaccine Age 50> (1 of 2) Never done  Foot Exam Q1  10/14/2016  A1C test (Diabetic or Prediabetic)  12/14/2020  Pneumococcal 65+ years (1 of 1 - PPSV23) 06/19/2030 (Originally 5/16/2015)  Flu Vaccine (Season Ended) 09/01/2021  MICROALBUMIN Q1  09/14/2021  Lipid Screen  09/14/2021  Prostate-Specific Antigen  09/15/2021  Eye Exam Retinal or Dilated  01/07/2022  Medicare Yearly Exam  04/14/2022  Colorectal Cancer Screening Combo  10/23/2025  DTaP/Tdap/Td series (3 - Td) 07/29/2030  Hepatitis C Screening  Completed  AAA Screening 73-69 YO Male Smoking Patients  Completed I have discussed the diagnosis with the patient and the intended plan as seen in the above orders. The patient has received an After-Visit Summary and questions were answered concerning future plans. An After Visit Summary was printed and given to the patient. All diagnosis have been discussed with the patient and all of the patient's questions have been answered. Follow-up and Dispositions · Return in about 4 months (around 8/13/2021) for DM, DM foot, HLD, HTN, 30 min, office only. VAUGHN Hall- [facility] [address] [facility] [address] [id number] This is the Subsequent Medicare Annual Wellness Exam, performed 12 months or more after the Initial AWV or the last Subsequent AWV I have reviewed the patient's medical history in detail and updated the computerized patient record. Assessment/Plan Education and counseling provided: 
Are appropriate based on today's review and evaluation End-of-Life planning (with patient's consent) 1. Medicare annual wellness visit, subsequent 2. Advanced care planning/counseling discussion 3. Essential hypertension -     METABOLIC PANEL, COMPREHENSIVE; Future 4. Insulin dependent type 2 diabetes mellitus, uncontrolled ([facility] [address] Assessment & Plan: This condition is managed by Specialist. 
Orders: 
-     HEMOGLOBIN A1C WITH EAG; Future -     MICROALBUMIN, UR, RAND W/ MICROALB/CREAT RATIO; Future 5. Mixed hyperlipidemia -     LIPID PANEL; Future 6. Microalbuminuria -     MICROALBUMIN, UR, RAND W/ MICROALB/CREAT RATIO; Future 7. Vitamin D deficiency 8. Malignant neoplasm of head of pancreas ([facility] [address] Assessment & Plan: This condition is managed by Specialist. 
9. Drug-induced polyneuropathy ([address] Assessment & Plan: This condition is managed by Specialist. 
10. Paroxysmal atrial fibrillation ([facility] [address] Assessment & Plan: This condition is managed by Specialist. 
11. Encounter to establish care Depression Risk Factor Screening 3 most recent PHQ Screens 4/13/2021 Little interest or pleasure in doing things Not at all Feeling down, depressed, irritable, or hopeless Several days Total Score PHQ 2 1 Alcohol Risk Screen Do you average more than 1 drink per night or more than 7 drinks a week: No 
 
In the past three months have you have had more than 4 drinks containing alcohol on one occasion: No 
 
 
 
Functional Ability and Level of Safety Hearing: Hearing is good. Activities of Daily Living: The home contains: no safety equipment. Patient does total self care Ambulation: with difficulty, uses a cane Fall Risk: 
Fall Risk Assessment, last 12 mths 4/13/2021 Able to walk? Yes Fall in past 12 months? 1 Do you feel unsteady? 0 Are you worried about falling 0 Is the gait abnormal? 0 Number of falls in past 12 months 2 Fall with injury? 1 Abuse Screen: 
Patient is not abused Cognitive Screening Has your family/caregiver stated any concerns about your memory: no 
  
Cognitive Screening: Normal - Clock Drawing Test 
 
Health Maintenance Due Health Maintenance Due Topic Date Due  
 COVID-19 Vaccine (1) Never done  Shingrix Vaccine Age 50> (1 of 2) Never done  Foot Exam Q1  10/14/2016  A1C test (Diabetic or Prediabetic)  12/14/2020 Patient Care Team  
Patient Care Team: 
Selene Dakins, MD as PCP - General (Internal Medicine) Pro Wolfe NP as PCP - [address] [place] [address] Provider Leesa Shelby DPM (Podiatry) Amaryllis Hammans, MD (Gastroenterology) Oren Alejandro MD (Sleep Medicine) Denisse Ding MD (Ophthalmology) Ilene Norton MD (Hematology and Oncology) Yasmin Haywood MD (Cardiology) Leesa Shelby DPM (Podiatry) Alysha Larson MD (Gastroenterology) Denisse Ding MD (Ophthalmology) Lubna Carpenter MD (Endocrinology) Mercy Leon, RN as Ambulatory Care Manager History Patient Active Problem List  
Diagnosis Code  Hypertension I10  
 Hyperlipidemia E78.5  Vitamin D deficiency E55.9  Sleep apnea G47.30  Stasis dermatitis I87.2  Internal hemorrhoid K64.8  Microalbuminuria R80.9  Insulin dependent type 2 diabetes mellitus, uncontrolled (HCC) E11.65, Z79.4  DDD (degenerative disc disease), lumbar M51.36  
 Paroxysmal atrial fibrillation (HCC) I48.0  Nocturia R35.1  Anemia D64.9 Collette Satchel Former smoker Z87.891  
 History of acute pancreatitis Z87.19  
 Pancreatic pseudocyst K86.3  Lumbago M54.5  Type 2 diabetes mellitus without complication (HCC) O07.0  Malignant neoplasm of head of pancreas ([facility] [address] C25.0  Malignant neoplasm of esophagus (HCC) C15.9  Malignant neoplasm of pancreas ([facility] [address] C25.9  Prostatitis N41.9  Drug-induced polyneuropathy ([facility] [address] G62.0 Past Medical History:  
Diagnosis Date  BPH with obstruction/lower urinary tract symptoms  Colon polyps Dr. Greg Schreiber  DDD (degenerative disc disease), lumbar 9/15  Hyperlipidemia  Hypertension Georgianna Olszewski Internal hemorrhoid  Kidney stone  Microalbuminuria  Nephrolithiasis  Nocturia  Pancreatic cancer ([facility] [address] Stage IV, Taking Chemo for it at [facility]. 15  Penile lesion  Sleep apnea   
 has CPAP - Dr. Ronal Romberg  Stasis dermatitis  Testicular pain, left  Tobacco abuse  Urinary frequency  Vitamin D deficiency Past Surgical History:  
Procedure Laterality Date  HX CATARACT REMOVAL    
 x2  
 HX COLONOSCOPY  01/13/2015 Dr. Edgard Rodas - repeat 1/20  HX CYST REMOVAL Back Current Outpatient Medications Medication Sig Dispense Refill  metoprolol tartrate (LOPRESSOR) 25 mg tablet Take 1 Tab by mouth two (2) times a day. TAKE ONE TABLET BY MOUTH TWICE DAILY 180 Tab 2  
 lidocaine-prilocaine (EMLA) topical cream APPLY 1 2 INCH OF CREAM TO PORT SITE 30 40 MINUTES PRIOR TO PORT USE  Basaglar KwikPen U-100 Insulin 100 unit/mL (3 mL) inpn INJECT 50 UNITS SUBCUTANEOUSLY ONCE DAILY OR AS DIRECTED  tamsulosin (FLOMAX) 0.4 mg capsule Take 1 Cap by mouth daily (after dinner). (Patient taking differently: Take 0.4 mg by mouth daily (after dinner). As needed) 90 Cap 3  
 organ concentrates (PROSTATE PO) Take  by mouth. 2 capsules daily  b complex vitamins tablet Take 1 Tab by mouth daily.  TURMERIC PO Take 500 mg by mouth. 1 tablet daily  MILK THISTLE PO Take 250 mg by mouth daily.     
 multivit-min/folic/vit K/lycop (MEN'S DAILY FORMULA PO) Take by mouth. For [employer] prostate health. 1 capsule 2 times a day  acetaminophen (TYLENOL) 500 mg tablet 500 mg every six (6) hours as needed.  insulin aspart (NOVOLOG FLEXPEN) 100 unit/mL inpn 10-15 Units by SubCUTAneous route. 15 units subcutaneously 15 minutes before breakfast and 15units 15 minutes before lunch and 15units 15 minutes before dinner.  warfarin (COUMADIN) 3 mg tablet Take 1 Tab by mouth daily. (Patient taking differently: Take 3 mg by mouth daily. 3 tablets 4 days a week, 2 tablets 3 days a week) 10 Tab 0  
 Lancets misc Dispense Accu Check Multi-Clix Drum - use to check blood sugar bid or as directed - 90 day supply 100 Each 3  Blood-Glucose Meter (ACCU-CHEK BRY) misc by Does Not Apply route.  glucose blood VI test strips (ACCU-CHEK SMARTVIEW) strip by Does Not Apply route See Admin Instructions.  Cholecalciferol, Vitamin D3, (VITAMIN D3) 2,000 unit cap daily.  Insulin Needles, Disposable, (NOVOFINE 32) 32 gauge x 1/4\" ndle USE AS DIRECTED WITH LEVEMIR DAILY 100 Pen Needle 11 Allergies Allergen Reactions  Iodinated Contrast Media Other (comments) 4/27/20 - PT TOLERATED IV CONTRAST WITHOUT PREMEDICATION WITH NO REACTION TODAY - LEK  
4/23/20 - PT STATES HE TOLERATED IV CONTRAST FOR A CT AT [facility] 01/2020 WITHOUT PREMEDICATION WITH NO REACTION - STATES ALLERGY IN CHART IS INCORRECT - LEK  Iodine Other (comments) 4/27/20 - PT TOLERATED IV CONTRAST WITHOUT PREMEDICATION WITH NO REACTION TODAY - LEK  
4/23/20 - PT STATES HE TOLERATED IV CONTRAST FOR A CT AT [facility] 01/2020 WITHOUT PREMEDICATION WITH NO REACTION - STATES ALLERGY IN CHART IS INCORRECT - LEK  Vasotec [Enalapril Maleate] Other (comments) Thought to have possibly caused his pancreatitis Family History Problem Relation Age of Onset  Cancer Mother   
     esophageal  
 Cancer Father   
     lung  Diabetes Maternal Grandmother Social History Tobacco Use  Smoking status: Former Smoker Packs/day: 1.00 Years: 42.00 Pack years: 42.00 Types: Cigarettes Quit date: 2016 Years since quittin.5  Smokeless tobacco: Never Used Substance Use Topics  Alcohol use: Not Currently Daryle Banning, NP

## 2021-04-13 NOTE — PROGRESS NOTES
1. Have you been to the ER, urgent care clinic since your last visit? Hospitalized since your last visit? Yes When: 4-11-21 Mariaelena Kenton for constipation 2. Have you seen or consulted any other health care providers outside of the [address] [place] [address] since your last visit? Include any pap smears or colon screening. Yes follow up with Neurology Dr. Shahla Brady Health Maintenance Due Topic Date Due  
 COVID-19 Vaccine (1) Never done  Shingrix Vaccine Age 50> (1 of 2) Never done  Foot Exam Q1  10/14/2016  A1C test (Diabetic or Prediabetic)  12/14/2020  Medicare Yearly Exam  01/14/2021

## 2021-06-02 PROBLEM — J96.01 ACUTE HYPOXEMIC RESPIRATORY FAILURE (HCC): Status: ACTIVE | Noted: 2021-01-01

## 2021-06-02 PROBLEM — C79.9 METASTATIC CANCER (HCC): Status: ACTIVE | Noted: 2021-01-01

## 2021-06-02 PROBLEM — J90 PLEURAL EFFUSION: Status: ACTIVE | Noted: 2021-01-01

## 2021-06-04 PROBLEM — E43 SEVERE PROTEIN-CALORIE MALNUTRITION (HCC): Chronic | Status: ACTIVE | Noted: 2021-01-01

## 2021-06-12 PROBLEM — Z79.01 WARFARIN ANTICOAGULATION: Status: ACTIVE | Noted: 2021-01-01

## 2021-06-12 PROBLEM — J91.0 MALIGNANT PLEURAL EFFUSION: Status: ACTIVE | Noted: 2021-01-01

## 2021-06-14 NOTE — PROGRESS NOTES
Transition of Care Coordination/Hospital to Post Acute Facility: 
  
Date/Time:  6/14/2021 1:06 PM 
 
Patient was admitted to [facility] on 6/2/2021. Patient was discharged 6/12/2021  to [facility], skilled nursing facility  for continuation of care. Transition of care outreach postponed for approximately  14 days due to patient's discharge to non-preferred network SNF.

## 2021-06-25 NOTE — PROGRESS NOTES
Care Transitions Initial Call    Call within 2 business days of discharge:   Unknown at this time. Patient: Meliza Ybarra Patient : 1950 MRN: [id number]    Last Discharge [address]       Complaint Diagnosis Description Type Department Provider    21 Shortness of Breath Acute hypoxemic respiratory failure ([facility] [address] . .. ED to Hosp-Admission (Discharged) (ADMIT) Cierra Love MD; Miguel Aquino. .. Was this an external facility discharge? Yes, 21 Discharge Facility:   [facility]. Per Chart review, patient transitioned to a skilled nursing faclility on 21. Care Transitions Nurse ( CTN) attempted to contact patient via telephone call to determine/ follow up if patient has been discharged from the skilled nursing facility. There was no response. A voicemail message was left requesting a non-emergency return telephone call. CTN  contact information provided.

## 2021-07-12 PROBLEM — J96.01 ACUTE HYPOXEMIC RESPIRATORY FAILURE (HCC): Status: RESOLVED | Noted: 2021-01-01 | Resolved: 2021-01-01

## 2021-07-12 PROBLEM — C25.9 MALIGNANT NEOPLASM OF PANCREAS (HCC): Status: RESOLVED | Noted: 2019-01-10 | Resolved: 2021-01-01

## 2021-07-12 NOTE — PROGRESS NOTES
Please [phone] this number [phone]      Patient is confused as to what medication he should take. Patient states \"I would like a B size oxygen tank she can order it through  [facility] [place] [facility] and I need a cpap machine. Patient states \" I would like to know if I need more fluid drained from my side\"    1. Have you been to the ER, urgent care clinic since your last visit? Hospitalized since your last visit? Yes Caty Brown     2. Have you seen or consulted any other health care providers outside of the [address] [place] [address] since your last visit? Include any pap smears or colon screening.  No      Health Maintenance Due   Topic Date Due    COVID-19 Vaccine (1) Never done    Shingrix Vaccine Age 50> (1 of 2) Never done    LDCT Smoker 30 Pack Years  Never done    Pneumococcal 65+ yrs at Risk Vaccine (1 of 2 - PCV13) Never done    Foot Exam Q1  10/14/2016    A1C test (Diabetic or Prediabetic)  12/14/2020

## 2021-07-15 NOTE — PROGRESS NOTES
Transition of Care     Per Chart Review, patient appears to be admitted into a [facility] acute care facility.      Transition of Care Episode closed